# Patient Record
Sex: FEMALE | Race: WHITE | NOT HISPANIC OR LATINO | URBAN - METROPOLITAN AREA
[De-identification: names, ages, dates, MRNs, and addresses within clinical notes are randomized per-mention and may not be internally consistent; named-entity substitution may affect disease eponyms.]

---

## 2019-10-05 ENCOUNTER — INPATIENT (INPATIENT)
Facility: HOSPITAL | Age: 84
LOS: 3 days | Discharge: NURSING, REHAB OR LONG-TERM CARE | DRG: 65 | End: 2019-10-09
Attending: INTERNAL MEDICINE | Admitting: INTERNAL MEDICINE
Payer: MEDICARE

## 2019-10-05 ENCOUNTER — EMERGENCY (EMERGENCY)
Facility: HOSPITAL | Age: 84
LOS: 1 days | Discharge: ANOTHER HOSPITAL | End: 2019-10-05
Attending: EMERGENCY MEDICINE | Admitting: EMERGENCY MEDICINE
Payer: MEDICARE

## 2019-10-05 VITALS
OXYGEN SATURATION: 99 % | DIASTOLIC BLOOD PRESSURE: 75 MMHG | RESPIRATION RATE: 16 BRPM | TEMPERATURE: 99 F | SYSTOLIC BLOOD PRESSURE: 139 MMHG | HEART RATE: 96 BPM

## 2019-10-05 VITALS
SYSTOLIC BLOOD PRESSURE: 200 MMHG | HEIGHT: 68 IN | RESPIRATION RATE: 14 BRPM | TEMPERATURE: 97 F | HEART RATE: 68 BPM | DIASTOLIC BLOOD PRESSURE: 110 MMHG | WEIGHT: 160.06 LBS | OXYGEN SATURATION: 99 %

## 2019-10-05 VITALS
RESPIRATION RATE: 18 BRPM | DIASTOLIC BLOOD PRESSURE: 66 MMHG | SYSTOLIC BLOOD PRESSURE: 142 MMHG | OXYGEN SATURATION: 98 % | HEART RATE: 88 BPM

## 2019-10-05 DIAGNOSIS — I62.00 NONTRAUMATIC SUBDURAL HEMORRHAGE, UNSPECIFIED: ICD-10-CM

## 2019-10-05 LAB
ALBUMIN SERPL ELPH-MCNC: 3.7 G/DL — SIGNIFICANT CHANGE UP (ref 3.3–5)
ALP SERPL-CCNC: 68 U/L — SIGNIFICANT CHANGE UP (ref 30–120)
ALT FLD-CCNC: 17 U/L DA — SIGNIFICANT CHANGE UP (ref 10–60)
ANION GAP SERPL CALC-SCNC: 12 MMOL/L — SIGNIFICANT CHANGE UP (ref 5–17)
APTT BLD: 31 SEC — SIGNIFICANT CHANGE UP (ref 28.5–37)
AST SERPL-CCNC: 18 U/L — SIGNIFICANT CHANGE UP (ref 10–40)
BASOPHILS # BLD AUTO: 0.04 K/UL — SIGNIFICANT CHANGE UP (ref 0–0.2)
BASOPHILS NFR BLD AUTO: 0.4 % — SIGNIFICANT CHANGE UP (ref 0–2)
BILIRUB SERPL-MCNC: 0.4 MG/DL — SIGNIFICANT CHANGE UP (ref 0.2–1.2)
BUN SERPL-MCNC: 16 MG/DL — SIGNIFICANT CHANGE UP (ref 7–23)
CALCIUM SERPL-MCNC: 9.8 MG/DL — SIGNIFICANT CHANGE UP (ref 8.4–10.5)
CHLORIDE SERPL-SCNC: 101 MMOL/L — SIGNIFICANT CHANGE UP (ref 96–108)
CO2 SERPL-SCNC: 24 MMOL/L — SIGNIFICANT CHANGE UP (ref 22–31)
CREAT SERPL-MCNC: 0.92 MG/DL — SIGNIFICANT CHANGE UP (ref 0.5–1.3)
EOSINOPHIL # BLD AUTO: 0.12 K/UL — SIGNIFICANT CHANGE UP (ref 0–0.5)
EOSINOPHIL NFR BLD AUTO: 1.1 % — SIGNIFICANT CHANGE UP (ref 0–6)
GLUCOSE BLDC GLUCOMTR-MCNC: 178 MG/DL — HIGH (ref 70–99)
GLUCOSE BLDC GLUCOMTR-MCNC: 200 MG/DL — HIGH (ref 70–99)
GLUCOSE BLDC GLUCOMTR-MCNC: 210 MG/DL — HIGH (ref 70–99)
GLUCOSE SERPL-MCNC: 190 MG/DL — HIGH (ref 70–99)
HCT VFR BLD CALC: 40.1 % — SIGNIFICANT CHANGE UP (ref 34.5–45)
HGB BLD-MCNC: 12.9 G/DL — SIGNIFICANT CHANGE UP (ref 11.5–15.5)
IMM GRANULOCYTES NFR BLD AUTO: 0.7 % — SIGNIFICANT CHANGE UP (ref 0–1.5)
INR BLD: 1.07 RATIO — SIGNIFICANT CHANGE UP (ref 0.88–1.16)
LYMPHOCYTES # BLD AUTO: 1.75 K/UL — SIGNIFICANT CHANGE UP (ref 1–3.3)
LYMPHOCYTES # BLD AUTO: 16 % — SIGNIFICANT CHANGE UP (ref 13–44)
MCHC RBC-ENTMCNC: 28 PG — SIGNIFICANT CHANGE UP (ref 27–34)
MCHC RBC-ENTMCNC: 32.2 GM/DL — SIGNIFICANT CHANGE UP (ref 32–36)
MCV RBC AUTO: 87.2 FL — SIGNIFICANT CHANGE UP (ref 80–100)
MONOCYTES # BLD AUTO: 0.73 K/UL — SIGNIFICANT CHANGE UP (ref 0–0.9)
MONOCYTES NFR BLD AUTO: 6.7 % — SIGNIFICANT CHANGE UP (ref 2–14)
NEUTROPHILS # BLD AUTO: 8.23 K/UL — HIGH (ref 1.8–7.4)
NEUTROPHILS NFR BLD AUTO: 75.1 % — SIGNIFICANT CHANGE UP (ref 43–77)
NRBC # BLD: 0 /100 WBCS — SIGNIFICANT CHANGE UP (ref 0–0)
PLATELET # BLD AUTO: 298 K/UL — SIGNIFICANT CHANGE UP (ref 150–400)
POTASSIUM SERPL-MCNC: 3.6 MMOL/L — SIGNIFICANT CHANGE UP (ref 3.5–5.3)
POTASSIUM SERPL-SCNC: 3.6 MMOL/L — SIGNIFICANT CHANGE UP (ref 3.5–5.3)
PROT SERPL-MCNC: 8.1 G/DL — SIGNIFICANT CHANGE UP (ref 6–8.3)
PROTHROM AB SERPL-ACNC: 11.7 SEC — SIGNIFICANT CHANGE UP (ref 10–12.9)
RBC # BLD: 4.6 M/UL — SIGNIFICANT CHANGE UP (ref 3.8–5.2)
RBC # FLD: 14 % — SIGNIFICANT CHANGE UP (ref 10.3–14.5)
SODIUM SERPL-SCNC: 137 MMOL/L — SIGNIFICANT CHANGE UP (ref 135–145)
TROPONIN I SERPL-MCNC: 0 NG/ML — LOW (ref 0.02–0.06)
WBC # BLD: 10.95 K/UL — HIGH (ref 3.8–10.5)
WBC # FLD AUTO: 10.95 K/UL — HIGH (ref 3.8–10.5)

## 2019-10-05 PROCEDURE — 85027 COMPLETE CBC AUTOMATED: CPT

## 2019-10-05 PROCEDURE — 84484 ASSAY OF TROPONIN QUANT: CPT

## 2019-10-05 PROCEDURE — 99291 CRITICAL CARE FIRST HOUR: CPT

## 2019-10-05 PROCEDURE — 70450 CT HEAD/BRAIN W/O DYE: CPT

## 2019-10-05 PROCEDURE — 99285 EMERGENCY DEPT VISIT HI MDM: CPT

## 2019-10-05 PROCEDURE — 71045 X-RAY EXAM CHEST 1 VIEW: CPT

## 2019-10-05 PROCEDURE — 36415 COLL VENOUS BLD VENIPUNCTURE: CPT

## 2019-10-05 PROCEDURE — 70450 CT HEAD/BRAIN W/O DYE: CPT | Mod: 26

## 2019-10-05 PROCEDURE — 93005 ELECTROCARDIOGRAM TRACING: CPT

## 2019-10-05 PROCEDURE — 82962 GLUCOSE BLOOD TEST: CPT

## 2019-10-05 PROCEDURE — 85730 THROMBOPLASTIN TIME PARTIAL: CPT

## 2019-10-05 PROCEDURE — 70450 CT HEAD/BRAIN W/O DYE: CPT | Mod: 26,77

## 2019-10-05 PROCEDURE — 71045 X-RAY EXAM CHEST 1 VIEW: CPT | Mod: 26

## 2019-10-05 PROCEDURE — 93010 ELECTROCARDIOGRAM REPORT: CPT

## 2019-10-05 PROCEDURE — 80053 COMPREHEN METABOLIC PANEL: CPT

## 2019-10-05 PROCEDURE — 85610 PROTHROMBIN TIME: CPT

## 2019-10-05 RX ORDER — GLYBURIDE-METFORMIN HYDROCHLORIDE 1.25; 25 MG/1; MG/1
1 TABLET ORAL
Qty: 0 | Refills: 0 | DISCHARGE

## 2019-10-05 RX ORDER — DEXTROSE 50 % IN WATER 50 %
12.5 SYRINGE (ML) INTRAVENOUS ONCE
Refills: 0 | Status: DISCONTINUED | OUTPATIENT
Start: 2019-10-05 | End: 2019-10-09

## 2019-10-05 RX ORDER — METOPROLOL TARTRATE 50 MG
50 TABLET ORAL DAILY
Refills: 0 | Status: DISCONTINUED | OUTPATIENT
Start: 2019-10-05 | End: 2019-10-06

## 2019-10-05 RX ORDER — ACETAMINOPHEN 500 MG
975 TABLET ORAL ONCE
Refills: 0 | Status: COMPLETED | OUTPATIENT
Start: 2019-10-05 | End: 2019-10-05

## 2019-10-05 RX ORDER — LEVOTHYROXINE SODIUM 125 MCG
112 TABLET ORAL DAILY
Refills: 0 | Status: DISCONTINUED | OUTPATIENT
Start: 2019-10-05 | End: 2019-10-09

## 2019-10-05 RX ORDER — INSULIN LISPRO 100/ML
VIAL (ML) SUBCUTANEOUS
Refills: 0 | Status: DISCONTINUED | OUTPATIENT
Start: 2019-10-05 | End: 2019-10-09

## 2019-10-05 RX ORDER — FUROSEMIDE 40 MG
20 TABLET ORAL DAILY
Refills: 0 | Status: DISCONTINUED | OUTPATIENT
Start: 2019-10-05 | End: 2019-10-07

## 2019-10-05 RX ORDER — SODIUM CHLORIDE 9 MG/ML
1000 INJECTION, SOLUTION INTRAVENOUS
Refills: 0 | Status: DISCONTINUED | OUTPATIENT
Start: 2019-10-05 | End: 2019-10-09

## 2019-10-05 RX ORDER — LISINOPRIL 2.5 MG/1
20 TABLET ORAL DAILY
Refills: 0 | Status: DISCONTINUED | OUTPATIENT
Start: 2019-10-05 | End: 2019-10-08

## 2019-10-05 RX ORDER — NICARDIPINE HYDROCHLORIDE 30 MG/1
5 CAPSULE, EXTENDED RELEASE ORAL
Qty: 40 | Refills: 0 | Status: DISCONTINUED | OUTPATIENT
Start: 2019-10-05 | End: 2019-10-20

## 2019-10-05 RX ORDER — GLUCAGON INJECTION, SOLUTION 0.5 MG/.1ML
1 INJECTION, SOLUTION SUBCUTANEOUS ONCE
Refills: 0 | Status: DISCONTINUED | OUTPATIENT
Start: 2019-10-05 | End: 2019-10-09

## 2019-10-05 RX ORDER — DEXTROSE 50 % IN WATER 50 %
15 SYRINGE (ML) INTRAVENOUS ONCE
Refills: 0 | Status: DISCONTINUED | OUTPATIENT
Start: 2019-10-05 | End: 2019-10-09

## 2019-10-05 RX ORDER — SIMVASTATIN 20 MG/1
5 TABLET, FILM COATED ORAL AT BEDTIME
Refills: 0 | Status: DISCONTINUED | OUTPATIENT
Start: 2019-10-05 | End: 2019-10-09

## 2019-10-05 RX ORDER — DEXTROSE 50 % IN WATER 50 %
25 SYRINGE (ML) INTRAVENOUS ONCE
Refills: 0 | Status: DISCONTINUED | OUTPATIENT
Start: 2019-10-05 | End: 2019-10-09

## 2019-10-05 RX ORDER — LABETALOL HCL 100 MG
10 TABLET ORAL ONCE
Refills: 0 | Status: COMPLETED | OUTPATIENT
Start: 2019-10-05 | End: 2019-10-05

## 2019-10-05 RX ORDER — INSULIN DETEMIR 100/ML (3)
0 INSULIN PEN (ML) SUBCUTANEOUS
Qty: 0 | Refills: 0 | DISCHARGE

## 2019-10-05 RX ORDER — INSULIN DETEMIR 100/ML (3)
3 INSULIN PEN (ML) SUBCUTANEOUS
Qty: 0 | Refills: 0 | DISCHARGE

## 2019-10-05 RX ORDER — NICARDIPINE HYDROCHLORIDE 30 MG/1
5 CAPSULE, EXTENDED RELEASE ORAL
Qty: 40 | Refills: 0 | Status: DISCONTINUED | OUTPATIENT
Start: 2019-10-05 | End: 2019-10-05

## 2019-10-05 RX ORDER — LISINOPRIL 2.5 MG/1
20 TABLET ORAL ONCE
Refills: 0 | Status: COMPLETED | OUTPATIENT
Start: 2019-10-05 | End: 2019-10-05

## 2019-10-05 RX ORDER — ONDANSETRON 8 MG/1
4 TABLET, FILM COATED ORAL ONCE
Refills: 0 | Status: COMPLETED | OUTPATIENT
Start: 2019-10-05 | End: 2019-10-05

## 2019-10-05 RX ADMIN — Medication 10 MILLIGRAM(S): at 11:50

## 2019-10-05 RX ADMIN — SIMVASTATIN 5 MILLIGRAM(S): 20 TABLET, FILM COATED ORAL at 21:19

## 2019-10-05 RX ADMIN — LISINOPRIL 20 MILLIGRAM(S): 2.5 TABLET ORAL at 17:56

## 2019-10-05 RX ADMIN — Medication 975 MILLIGRAM(S): at 17:56

## 2019-10-05 RX ADMIN — NICARDIPINE HYDROCHLORIDE 25 MG/HR: 30 CAPSULE, EXTENDED RELEASE ORAL at 12:16

## 2019-10-05 RX ADMIN — Medication 975 MILLIGRAM(S): at 21:00

## 2019-10-05 RX ADMIN — Medication 10 MILLIGRAM(S): at 12:00

## 2019-10-05 RX ADMIN — ONDANSETRON 4 MILLIGRAM(S): 8 TABLET, FILM COATED ORAL at 17:11

## 2019-10-05 NOTE — CONSULT NOTE ADULT - SUBJECTIVE AND OBJECTIVE BOX
p (1480)     HPI: 83F presents as transfer from [place] after being brought there by family for confusion. On initital presentation she was found to have SBPs 220+ and started on cardine. Upon arrival to [facility] she was normotensive not on any drips and back to baseline mental and functional status.    Denies pain, HA, N, V.    Exam: awake, alert, oriented to person and place, FC, REDD x4 5/5 strength, SILT, no facial, no drift    --Anticoagulation:    =====================  PAST MEDICAL HISTORY   Primary hyperparathyroidism  HTN (hypertension)  Diabetes mellitus  Hyperlipidemia  Hypothyroid  H/O fracture of humerus    PAST SURGICAL HISTORY   S/P cholecystectomy    penicillin (Muscle Pain)      MEDICATIONS:  Antibiotics:    Neuro:    Other:  dextrose 40% Gel 15 Gram(s) Oral once PRN  dextrose 5%. 1000 milliLiter(s) IV Continuous <Continuous>  dextrose 50% Injectable 12.5 Gram(s) IV Push once  dextrose 50% Injectable 25 Gram(s) IV Push once  dextrose 50% Injectable 25 Gram(s) IV Push once  furosemide    Tablet 20 milliGRAM(s) Oral daily  glucagon  Injectable 1 milliGRAM(s) IntraMuscular once PRN  insulin lispro (HumaLOG) corrective regimen sliding scale   SubCutaneous three times a day before meals  levothyroxine 112 MICROGram(s) Oral daily  lisinopril 20 milliGRAM(s) Oral daily  metoprolol succinate ER 50 milliGRAM(s) Oral daily  simvastatin 5 milliGRAM(s) Oral at bedtime      SOCIAL HISTORY:   Occupation:   Marital Status:     FAMILY HISTORY:      ROS: Negative except per HPI    LABS:  PT/INR - ( 05 Oct 2019 12:45 )   PT: 11.7 sec;   INR: 1.07 ratio         PTT - ( 05 Oct 2019 12:45 )  PTT:31.0 sec                        12.9   10.95 )-----------( 298      ( 05 Oct 2019 11:22 )             40.1     10-05    137  |  101  |  16  ----------------------------<  190<H>  3.6   |  24  |  0.92    Ca    9.8      05 Oct 2019 11:22    TPro  8.1  /  Alb  3.7  /  TBili  0.4  /  DBili  x   /  AST  18  /  ALT  17  /  AlkPhos  68  10-05

## 2019-10-05 NOTE — ED ADULT NURSE REASSESSMENT NOTE - NS ED NURSE REASSESS COMMENT FT1
Patient aware they are being admitted to the hospital. Will be notified when bed is available. Patient/family has no further questions at this time.

## 2019-10-05 NOTE — ED PROVIDER NOTE - ATTENDING CONTRIBUTION TO CARE
83 F w/ PMH of DM, HTN, HLD presents to the ED s/p an episode of confusion, repetitive questioning this morning.  On asa last dose yesterday  Pt is on insulin. She reports that she has had no falls. Didn't take her home meds this AM,    On exam, pt is awake and alert oriented x4, she is no acute distress, she initially felt nauseous had improvement in blood pressure, while in the ed,  Const: Well-nourished, Well-developed, appearing stated age  Head: atraumatic, normocephalic  Eyes: no conjunctival injection and no scleral icterus pupils are 3 mm and reactive bilaterally, EOMI  ENMT: Atraumatic external nose and ears, Moist mucus membranes  Neck: Symmetric, trachea midline,  CVS: +S1/S2, dorsalis pedis/radial pulse 2+ bilaterally  RESP: Unlabored respiratory effort, Clear to auscultation bilaterally  GI: Nontender/Nondistended, soft abdomen  MSK: Extremities w/o deformity or ttp   Skin: Warm, Dry w/ no rashes  Neuro: GCS=15, CNs II-XII grossly intact, motor in all 4 extremities equal, Sensation intact bilaterally    NSG consult, and likely repeat ct scan in 4 hrs from initial

## 2019-10-05 NOTE — ED ADULT NURSE REASSESSMENT NOTE - NS ED NURSE REASSESS COMMENT FT1
pt transferred to [facility] via ambulance pt left ed awake and alert patent 20 larry iv cath in rt ac infusing cardene at 5mg/hr  report given to idris bundy. respirations even and unlabored color good pt offered no complaints at time of transport   family accompanying, plan of care reviewed with all

## 2019-10-05 NOTE — ED PROVIDER NOTE - ATTENDING CONTRIBUTION TO CARE
I have personally performed a face to face diagnostic evaluation on this patient.  I have reviewed the PA note and agree with the history, exam, and plan of care, except as noted.  History and Exam by me shows ...see Progress Note

## 2019-10-05 NOTE — ED PROVIDER NOTE - PHYSICAL EXAMINATION
Gen: No acute distress, alert, cooperative  HENT: Normocephalic, atraumatic.   Eyes: PERRL, EOMI    Resp: CTAB, non-labored, no wheeze  CV: rrr, no murmur  Abd: soft, NTND, no rebound or guarding  MSK: moving all extremities, no midline cervical tenderness  Skin: warm and dry  Neuro: AOx3, speech is fluent and appropriate, no focal deficits, cranial nerves intact, normal finger to nose  Psych: Normal affect

## 2019-10-05 NOTE — ED PROVIDER NOTE - PMH
Diabetes mellitus    H/O fracture of humerus    HTN (hypertension)    Hyperlipidemia    Hypothyroid    Primary hyperparathyroidism

## 2019-10-05 NOTE — ED ADULT NURSE NOTE - NSIMPLEMENTINTERV_GEN_ALL_ED
Implemented All Universal Safety Interventions:  [place] to call system. Call bell, personal items and telephone within reach. Instruct patient to call for assistance. Room bathroom lighting operational. Non-slip footwear when patient is off stretcher. Physically safe environment: no spills, clutter or unnecessary equipment. Stretcher in lowest position, wheels locked, appropriate side rails in place.

## 2019-10-05 NOTE — CONSULT NOTE ADULT - SUBJECTIVE AND OBJECTIVE BOX
CHIEF COMPLAINT:Patient is a 83y old  Female who presents with a chief complaint of head ache /nausea    HPI:   83  F presents w/ confusion, from [facility] transferred on [place] drip. Pt is not on thinners, atraumatic head bleed   	Family brought pt in not answering questions normally, Pt has a pmh of  DM, HTN, HLD    	on baby aspirin last dose was yesterday in the evening  upon arrival to [place], pt was hypertensive w/ SBP 220s, called NSG at [facility] who recommended cardene    PAST MEDICAL & SURGICAL HISTORY:  Primary hyperparathyroidism  HTN (hypertension)  Diabetes mellitus  Hyperlipidemia  Hypothyroid  H/O fracture of humerus  S/P cholecystectomy      MEDICATIONS  (STANDING):    MEDICATIONS  (PRN):      FAMILY HISTORY:      SOCIAL HISTORY:    [ ] Non-smoker  [ ] Smoker  [ ] Alcohol    Allergies    penicillin (Muscle Pain)    Intolerances    	    REVIEW OF SYSTEMS:  CONSTITUTIONAL: No fever, weight loss, or fatigue  EYES: No eye pain, visual disturbances, or discharge  ENT:  No difficulty hearing, tinnitus, vertigo; No sinus or throat pain  NECK: No pain or stiffness  RESPIRATORY: No cough, wheezing, chills or hemoptysis; No Shortness of Breath  CARDIOVASCULAR: No chest pain, palpitations, passing out, dizziness, or leg swelling  GASTROINTESTINAL: No abdominal or epigastric pain. No nausea, vomiting, or hematemesis; No diarrhea or constipation. No melena or hematochezia.  GENITOURINARY: No dysuria, frequency, hematuria, or incontinence  NEUROLOGICAL: No headaches, memory loss, loss of strength, numbness, or tremors  SKIN: No itching, burning, rashes, or lesions   LYMPH Nodes: No enlarged glands  ENDOCRINE: No heat or cold intolerance; No hair loss  MUSCULOSKELETAL: No joint pain or swelling; No muscle, back, or extremity pain  PSYCHIATRIC: No depression, anxiety, mood swings, or difficulty sleeping  HEME/LYMPH: No easy bruising, or bleeding gums  ALLERGY AND IMMUNOLOGIC: No hives or eczema	    [ ] All others negative	  [ ] Unable to obtain    PHYSICAL EXAM:  T(C): 37.3 (10-05-19 @ 16:45), Max: 37.3 (10-05-19 @ 13:53)  HR: 90 (10-05-19 @ 17:45) (68 - 96)  BP: 154/69 (10-05-19 @ 17:45) (139/75 - 220/105)  RR: 18 (10-05-19 @ 17:45) (14 - 20)  SpO2: 100% (10-05-19 @ 17:45) (97% - 100%)  Wt(kg): --  I&O's Summary      Appearance: Normal	  HEENT:   Normal oral mucosa, PERRL, EOMI	  Lymphatic: No lymphadenopathy  Cardiovascular: Normal S1 S2, No JVD, +murmurs, No edema  Respiratory: Lungs clear to auscultation	  Psychiatry: A & O x 3, Mood & affect appropriate  Gastrointestinal:  Soft, Non-tender, + BS	  Skin: No rashes, No ecchymoses, No cyanosis	  Neurologic: Non-focal  Extremities: Normal range of motion, No clubbing, cyanosis or edema  Vascular: Peripheral pulses palpable 2+ bilaterally    TELEMETRY: 	    ECG:  	  RADIOLOGY:  OTHER: 	  	  LABS:	 	    CARDIAC MARKERS:  CARDIAC MARKERS ( 05 Oct 2019 11:22 )  .000 ng/mL / x     / x     / x     / x                                  12.9   10.95 )-----------( 298      ( 05 Oct 2019 11:22 )             40.1     10-05    137  |  101  |  16  ----------------------------<  190<H>  3.6   |  24  |  0.92    Ca    9.8      05 Oct 2019 11:22    TPro  8.1  /  Alb  3.7  /  TBili  0.4  /  DBili  x   /  AST  18  /  ALT  17  /  AlkPhos  68  10-05    proBNP:   Lipid Profile:   HgA1c:   TSH:   PT/INR - ( 05 Oct 2019 12:45 )   PT: 11.7 sec;   INR: 1.07 ratio         PTT - ( 05 Oct 2019 12:45 )  PTT:31.0 sec    PREVIOUS DIAGNOSTIC TESTING:    < from: CT Head No Cont (10.05.19 @ 16:41) >  IMPRESSION:   Unchanged acute subdural hemorrhage along the falx and left territorial   leaflet.

## 2019-10-05 NOTE — H&P ADULT - HISTORY OF PRESENT ILLNESS
: 83 F presents w/ confusion, from [facility] transferred on cardene drip.    Pt is not on thinners, atraumatic head bleed.   Family brought pt in, as  pt was  not answering questions normally/  s/p  brief  confusion  , Pt has a pmh of  DM, HTN, HLD   on baby aspirin last dose was yesterday in the evening upon arrival to [place], pt was hypertensive w/ SBP 220s, called NSG at [facility] who recommended cardene,	  in er,  alert/  talking well/ no  cp/sob/ headcahes

## 2019-10-05 NOTE — CONSULT NOTE ADULT - ASSESSMENT
83  F presents w/ confusion, from [facility] transferred on [place] drip. Pt is not on thinners, atraumatic head bleed   	Family brought pt in not answering questions normally, Pt has a pmh of  DM, HTN, HLD    	on baby aspirin last dose was yesterday in the evening  upon arrival to [place], pt was hypertensive w/ SBP 220s, called NSG at [facility] who recommended [place]  uncontrolled htn  continue beta blocker and ace if not controlled will consider iv [place]  repeat head ct scan  neuro check  tele  echo  lipid panel  tsh

## 2019-10-05 NOTE — H&P ADULT - NSHPREVIEWOFSYSTEMS_GEN_ALL_CORE
REVIEW OF SYSTEMS:  GEN: no fever,    no chills  RESP: no SOB,   no cough  CVS: no chest pain,   no palpitations  GI: no abdominal pain,   no nausea,   no vomiting,   no constipation,   no diarrhea  : no dysuria,   no frequency  NEURO:  headache,   no dizziness  PSYCH: no depression,   not anxious  Derm : no rash

## 2019-10-05 NOTE — ED PROVIDER NOTE - CONSTITUTIONAL, MLM
normal... Well appearing, well nourished, awake, alert, oriented to person, place not time and in no apparent distress.

## 2019-10-05 NOTE — ED PROVIDER NOTE - PSYCHIATRIC, MLM
Alert and oriented to person, place, not time normal mood and affect. no apparent risk to self or others.

## 2019-10-05 NOTE — ED ADULT NURSE NOTE - OBJECTIVE STATEMENT
pt spoke to family last night and was "normal mental status" upon family arrival today pt altered unable to recall people or circumstances of the day  pt oriented to place and self upon arrival unable to provide year

## 2019-10-05 NOTE — H&P ADULT - NSHPPHYSICALEXAM_GEN_ALL_CORE
PHYSICAL EXAMINATION:  Vital Signs Last 24 Hrs  T(C): 37.3 (05 Oct 2019 16:45), Max: 37.3 (05 Oct 2019 13:53)  T(F): 99.2 (05 Oct 2019 16:45), Max: 99.2 (05 Oct 2019 16:45)  HR: 90 (05 Oct 2019 17:45) (68 - 96)  BP: 154/69 (05 Oct 2019 17:45) (139/75 - 220/105)  BP(mean): --  RR: 18 (05 Oct 2019 17:45) (14 - 20)  SpO2: 100% (05 Oct 2019 17:45) (97% - 100%)  CAPILLARY BLOOD GLUCOSE      POCT Blood Glucose.: 195 mg/dL (05 Oct 2019 16:30)  POCT Blood Glucose.: 178 mg/dL (05 Oct 2019 10:59)        GENERAL: NAD, well-groomed,  HEAD:  atraumatic, normocephalic  EYES: sclera anicteric  ENMT: mucous membranes moist  NECK: supple, No JVD  CHEST/LUNG: clear to auscultation bilaterally;    no      rales   ,   no rhonchi,   HEART: normal S1, S2  ABDOMEN: BS+, soft, ND, NT   EXTREMITIES:    no    edema    b/l LEs  NEURO: awake, ,     moves all extremities  oriened  well/   SKIN: no     rash

## 2019-10-05 NOTE — ED PROVIDER NOTE - OBJECTIVE STATEMENT
Pt is a 84 yo female with pmhx of dm htn hld thyroid BIBEMS for AMS. As per family spoke with patient last night was A&Ox3 and [place]. This morning pt was called and did not recall plan at all was repeating same questions. Pt has no complaints other then feeling tired and some nausea. pt denies any chest pain sob abdominal pain fever cough dysuria falls numbness tingling. Pt states she is compliant with meds but family is not sure if she is taking them.     pcp Bharati

## 2019-10-05 NOTE — ED ADULT NURSE REASSESSMENT NOTE - NS ED NURSE REASSESS COMMENT FT1
Patient alert and oriented, in no distress at this time. Pending type and screen, MD Agrawal states to hold off on drawing blood at this time. Patient okay to drink. Patient aware she is NPO until seen by neurology. Patient alert and oriented, in no distress at this time. Pending type and screen, MD Agrawal states to hold off on drawing blood at this time. Patient aware she is NPO until seen by neurology. Please see yellow neuro check flow sheets for continuation of neuro checks.

## 2019-10-05 NOTE — ED PROVIDER NOTE - OBJECTIVE STATEMENT
83 F presents w/ confusion, from [facility] transferred on cardene drip. Pt is not on thinners, atraumatic headbleed.   Family brought pt in not answering questions normally, Pt has a pmh of  DM, HTN, HLD    on baby aspirin last dose was yesterday in the evening  upon arrival to [place], pt was hypertensive w/ SBP 220s, called NSG at [facility] who recommended cardene,

## 2019-10-05 NOTE — ED PROVIDER NOTE - PROGRESS NOTE DETAILS
pt is awake and alert in NAD, pt states that he daughter (a nurse) states that she was confused and was told , pt was last seen was yesterday and this AM family came by this morning and she was reported to come to the ED, AKL spoke to neurosurg, aware of transfer, will come see patient. AK: tried calling pmd number listed, no answer. Not in directory. Will admit to unattached. Bridgett Calderon M.D: pt was signed out to me pending repeat CT and admission. at this time pt is off nicardipine and repeat ct is stable and pt is to be admitted for monitoring and bp control. pt neuro intact and stable at this time.

## 2019-10-05 NOTE — ED ADULT NURSE NOTE - OBJECTIVE STATEMENT
83 year old Female, PMH: HTN, HLD, DM, hypothyroid. Around 11am this morning, patient was confused and hypertensive 200s- brought in to [facility]- as per EMS patient has a subdural bleed, /75 in route. As per EMS, no deficits found, not on anticoagulation started on Cardiene 2.5mL/hr, . Upon arrival to ED, patient A&Ox3, awake, alert and following commands, breathing spontaneous and unlabored, equal chest rise and fall, palpable pulses, abdomen nontender upon palpation, nondistended, motor and sensory intact. IV placed, vitals recorded, neuro check completed, dysphagia completed, placed on cardiac monitor- sinus rhythm, on room air. In no distress at this time. Family at bedside. Patient pending CT. Patient educated to ask for assistance to ambulate, bed locked and in lowest position with side rails up for safety. Comfort and safety provided.

## 2019-10-05 NOTE — ED PROVIDER NOTE - CLINICAL SUMMARY MEDICAL DECISION MAKING FREE TEXT BOX
83 F presents as transfer from [place] for SDH after presenting with confusion. Transferred on cardene drip. Neuro intact, no deficit, no confusion noted. Neurosurgery contacted.

## 2019-10-05 NOTE — ED PROVIDER NOTE - CARE PLAN
Principal Discharge DX:	Subdural bleeding Principal Discharge DX:	Subdural bleeding  Secondary Diagnosis:	Hypertension

## 2019-10-05 NOTE — ED PROVIDER NOTE - NS_ ATTENDINGSCRIBEDETAILS _ED_A_ED_FT
Venu Martinez MD - The scribe's documentation has been prepared under my direction and personally reviewed by me in its entirety. I confirm that the note above accurately reflects all work, treatment, procedures, and medical decision making performed by me.

## 2019-10-05 NOTE — ED PROVIDER NOTE - PROGRESS NOTE DETAILS
Mercy Jones for attending Dr. Martinez: Awake, cephalic and atraumatic. Pt c/o nasuea before. No headache, chest pain, or SOB.  Questionable compliance with medications. Physical exam is unremarkable. nonfocal, no neural deficits. I Keren Jones attest that this documentation has been prepared under the direction and in the presence of Doctor Martinez. + subdural labatalol x2 for bp given transfer initiated spoke with dr. Goel goal 160 started on cardene drop bp improved accepting doctor Dr. Castro   labs wnl  will transfer

## 2019-10-05 NOTE — H&P ADULT - NSICDXPASTMEDICALHX_GEN_ALL_CORE_FT
PAST MEDICAL HISTORY:  Diabetes mellitus     H/O fracture of humerus     HTN (hypertension)     Hyperlipidemia     Hypothyroid     Primary hyperparathyroidism

## 2019-10-05 NOTE — H&P ADULT - NSHPLABSRESULTS_GEN_ALL_CORE
LABS:                        12.9   10.95 )-----------( 298      ( 05 Oct 2019 11:22 )             40.1     10-05    137  |  101  |  16  ----------------------------<  190<H>  3.6   |  24  |  0.92    Ca    9.8      05 Oct 2019 11:22    TPro  8.1  /  Alb  3.7  /  TBili  0.4  /  DBili  x   /  AST  18  /  ALT  17  /  AlkPhos  68  10-05    PT/INR - ( 05 Oct 2019 12:45 )   PT: 11.7 sec;   INR: 1.07 ratio         PTT - ( 05 Oct 2019 12:45 )  PTT:31.0 sec  CARDIAC MARKERS ( 05 Oct 2019 11:22 )  .000 ng/mL / x     / x     / x     / x

## 2019-10-05 NOTE — CONSULT NOTE ADULT - ASSESSMENT
Reyna Velasco  83F PMHx DM, HTN, HLD, hypothyroidism presented to [place] ED after episode of AMS in am, found to have ’s, put on cardene and transferred to [facility]. Patient is currently normotensive, not on any drips. CT head shows acute falcine and L tentorial SDH without shift. Per family patient likely has not been taking medications as prescribed. Not on AC. Exam: awake, alert, oriented to person and place, REDD x4 5/5 strength, SILT, no facial, no drift  - Repeat 4hr CT stable  - INR 1.07  - No acute neurosurgical intervention, reconsult as necessary

## 2019-10-06 DIAGNOSIS — E87.1 HYPO-OSMOLALITY AND HYPONATREMIA: ICD-10-CM

## 2019-10-06 LAB
ANION GAP SERPL CALC-SCNC: 11 MMOL/L — SIGNIFICANT CHANGE UP (ref 5–17)
ANION GAP SERPL CALC-SCNC: 11 MMOL/L — SIGNIFICANT CHANGE UP (ref 5–17)
ANION GAP SERPL CALC-SCNC: 13 MMOL/L — SIGNIFICANT CHANGE UP (ref 5–17)
BUN SERPL-MCNC: 13 MG/DL — SIGNIFICANT CHANGE UP (ref 7–23)
BUN SERPL-MCNC: 13 MG/DL — SIGNIFICANT CHANGE UP (ref 7–23)
BUN SERPL-MCNC: 19 MG/DL — SIGNIFICANT CHANGE UP (ref 7–23)
CALCIUM SERPL-MCNC: 8.8 MG/DL — SIGNIFICANT CHANGE UP (ref 8.4–10.5)
CALCIUM SERPL-MCNC: 8.8 MG/DL — SIGNIFICANT CHANGE UP (ref 8.4–10.5)
CALCIUM SERPL-MCNC: 9 MG/DL — SIGNIFICANT CHANGE UP (ref 8.4–10.5)
CHLORIDE SERPL-SCNC: 91 MMOL/L — LOW (ref 96–108)
CHLORIDE SERPL-SCNC: 92 MMOL/L — LOW (ref 96–108)
CHLORIDE SERPL-SCNC: 94 MMOL/L — LOW (ref 96–108)
CO2 SERPL-SCNC: 20 MMOL/L — LOW (ref 22–31)
CO2 SERPL-SCNC: 21 MMOL/L — LOW (ref 22–31)
CO2 SERPL-SCNC: 21 MMOL/L — LOW (ref 22–31)
CREAT SERPL-MCNC: 0.6 MG/DL — SIGNIFICANT CHANGE UP (ref 0.5–1.3)
CREAT SERPL-MCNC: 0.6 MG/DL — SIGNIFICANT CHANGE UP (ref 0.5–1.3)
CREAT SERPL-MCNC: 0.67 MG/DL — SIGNIFICANT CHANGE UP (ref 0.5–1.3)
GLUCOSE BLDC GLUCOMTR-MCNC: 155 MG/DL — HIGH (ref 70–99)
GLUCOSE BLDC GLUCOMTR-MCNC: 194 MG/DL — HIGH (ref 70–99)
GLUCOSE BLDC GLUCOMTR-MCNC: 199 MG/DL — HIGH (ref 70–99)
GLUCOSE BLDC GLUCOMTR-MCNC: 211 MG/DL — HIGH (ref 70–99)
GLUCOSE SERPL-MCNC: 161 MG/DL — HIGH (ref 70–99)
GLUCOSE SERPL-MCNC: 182 MG/DL — HIGH (ref 70–99)
GLUCOSE SERPL-MCNC: 217 MG/DL — HIGH (ref 70–99)
HBA1C BLD-MCNC: 7.4 % — HIGH (ref 4–5.6)
HCT VFR BLD CALC: 36.5 % — SIGNIFICANT CHANGE UP (ref 34.5–45)
HGB BLD-MCNC: 11.5 G/DL — SIGNIFICANT CHANGE UP (ref 11.5–15.5)
MCHC RBC-ENTMCNC: 27.9 PG — SIGNIFICANT CHANGE UP (ref 27–34)
MCHC RBC-ENTMCNC: 31.5 GM/DL — LOW (ref 32–36)
MCV RBC AUTO: 88.6 FL — SIGNIFICANT CHANGE UP (ref 80–100)
PLATELET # BLD AUTO: 263 K/UL — SIGNIFICANT CHANGE UP (ref 150–400)
POTASSIUM SERPL-MCNC: 3.5 MMOL/L — SIGNIFICANT CHANGE UP (ref 3.5–5.3)
POTASSIUM SERPL-MCNC: 3.6 MMOL/L — SIGNIFICANT CHANGE UP (ref 3.5–5.3)
POTASSIUM SERPL-MCNC: 3.7 MMOL/L — SIGNIFICANT CHANGE UP (ref 3.5–5.3)
POTASSIUM SERPL-SCNC: 3.5 MMOL/L — SIGNIFICANT CHANGE UP (ref 3.5–5.3)
POTASSIUM SERPL-SCNC: 3.6 MMOL/L — SIGNIFICANT CHANGE UP (ref 3.5–5.3)
POTASSIUM SERPL-SCNC: 3.7 MMOL/L — SIGNIFICANT CHANGE UP (ref 3.5–5.3)
RBC # BLD: 4.12 M/UL — SIGNIFICANT CHANGE UP (ref 3.8–5.2)
RBC # FLD: 14.4 % — SIGNIFICANT CHANGE UP (ref 10.3–14.5)
SODIUM SERPL-SCNC: 123 MMOL/L — LOW (ref 135–145)
SODIUM SERPL-SCNC: 125 MMOL/L — LOW (ref 135–145)
SODIUM SERPL-SCNC: 126 MMOL/L — LOW (ref 135–145)
WBC # BLD: 12.87 K/UL — HIGH (ref 3.8–10.5)
WBC # FLD AUTO: 12.87 K/UL — HIGH (ref 3.8–10.5)

## 2019-10-06 PROCEDURE — 99222 1ST HOSP IP/OBS MODERATE 55: CPT

## 2019-10-06 RX ORDER — FUROSEMIDE 40 MG
20 TABLET ORAL ONCE
Refills: 0 | Status: COMPLETED | OUTPATIENT
Start: 2019-10-06 | End: 2019-10-06

## 2019-10-06 RX ORDER — METOPROLOL TARTRATE 50 MG
25 TABLET ORAL ONCE
Refills: 0 | Status: COMPLETED | OUTPATIENT
Start: 2019-10-06 | End: 2019-10-06

## 2019-10-06 RX ORDER — ONDANSETRON 8 MG/1
4 TABLET, FILM COATED ORAL EVERY 8 HOURS
Refills: 0 | Status: DISCONTINUED | OUTPATIENT
Start: 2019-10-06 | End: 2019-10-08

## 2019-10-06 RX ORDER — METOPROLOL TARTRATE 50 MG
75 TABLET ORAL DAILY
Refills: 0 | Status: DISCONTINUED | OUTPATIENT
Start: 2019-10-06 | End: 2019-10-09

## 2019-10-06 RX ORDER — ONDANSETRON 8 MG/1
4 TABLET, FILM COATED ORAL ONCE
Refills: 0 | Status: COMPLETED | OUTPATIENT
Start: 2019-10-06 | End: 2019-10-06

## 2019-10-06 RX ADMIN — Medication 2: at 12:32

## 2019-10-06 RX ADMIN — ONDANSETRON 4 MILLIGRAM(S): 8 TABLET, FILM COATED ORAL at 13:32

## 2019-10-06 RX ADMIN — Medication 112 MICROGRAM(S): at 05:14

## 2019-10-06 RX ADMIN — Medication 50 MILLIGRAM(S): at 05:15

## 2019-10-06 RX ADMIN — SIMVASTATIN 5 MILLIGRAM(S): 20 TABLET, FILM COATED ORAL at 21:11

## 2019-10-06 RX ADMIN — Medication 20 MILLIGRAM(S): at 05:14

## 2019-10-06 RX ADMIN — Medication 25 MILLIGRAM(S): at 15:36

## 2019-10-06 RX ADMIN — LISINOPRIL 20 MILLIGRAM(S): 2.5 TABLET ORAL at 05:15

## 2019-10-06 RX ADMIN — Medication 1: at 08:32

## 2019-10-06 RX ADMIN — Medication 20 MILLIGRAM(S): at 14:58

## 2019-10-06 RX ADMIN — Medication 1: at 17:49

## 2019-10-06 RX ADMIN — ONDANSETRON 4 MILLIGRAM(S): 8 TABLET, FILM COATED ORAL at 21:11

## 2019-10-06 NOTE — CONSULT NOTE ADULT - PROBLEM SELECTOR RECOMMENDATION 9
Euvolemia hyponatremia, acute; possibly 2/2 SIADH in setting recent head trauma (atraumatic SDH) and/or nausea.  On admission Scr 139, downtrend 126 next day 10/6.  Euvolemia on examination, denies hx malignancy/hyponatremia, change medications/diuretic.    PLAN:  - please start urea 15 BID, lasix 20 (saline diuresis), increase protein intake (ensure TID)  - free water restriction 1L daily  - please obtain urine electrolytes, urine osmolality, urine uric acid level  - please check TSH/FT4, cortisol level  - start antiemetic PRN  - trend serum BMP q6hr (ordered next bmp 6pm)

## 2019-10-06 NOTE — CONSULT NOTE ADULT - SUBJECTIVE AND OBJECTIVE BOX
HPI:   Patient is a 83y female who has been well until yesterday morning her daughter found her confused, took her to the hospital and she was found to have a sdh . No known trauma, no recent episodes of strong cough , hard squeeze for a bm, , abrupt stop and start in a car, dental work, vomiting . She was feeling perfectly well the day before and now all she feels is some mild nausea and mild headache. She was hypertensive on arrival. She only takes aspirin , no other antiplt or anticoag.     REVIEW OF SYSTEMS:  All other review of systems negative (Comprehensive ROS)    PAST MEDICAL & SURGICAL HISTORY:  Primary hyperparathyroidism  HTN (hypertension)  Diabetes mellitus  Hyperlipidemia  Hypothyroid  H/O fracture of humerus  S/P cholecystectomy      Allergies    penicillin (Muscle Pain)    Intolerances        Antimicrobials Day #  :    Other Medications:  dextrose 40% Gel 15 Gram(s) Oral once PRN  dextrose 5%. 1000 milliLiter(s) IV Continuous <Continuous>  dextrose 50% Injectable 12.5 Gram(s) IV Push once  dextrose 50% Injectable 25 Gram(s) IV Push once  dextrose 50% Injectable 25 Gram(s) IV Push once  furosemide    Tablet 20 milliGRAM(s) Oral daily  glucagon  Injectable 1 milliGRAM(s) IntraMuscular once PRN  insulin lispro (HumaLOG) corrective regimen sliding scale   SubCutaneous three times a day before meals  levothyroxine 112 MICROGram(s) Oral daily  lisinopril 20 milliGRAM(s) Oral daily  metoprolol succinate ER 75 milliGRAM(s) Oral daily  ondansetron Injectable 4 milliGRAM(s) IV Push every 8 hours PRN  simvastatin 5 milliGRAM(s) Oral at bedtime  Ure-Na 15gram 1 Packet(s) 1 Packet(s) Oral two times a day      FAMILY HISTORY:      SOCIAL HISTORY:  Smoking: [ ]Yes [ xx]No  ETOH: [ ]Yes [ ]No  Drug Use: [ ]Yes [ x]No   [ x ] Single[ ]    T(F): 98.1 (10-06-19 @ 11:57), Max: 98.8 (10-05-19 @ 21:00)  HR: 71 (10-06-19 @ 11:57)  BP: 170/82 (10-06-19 @ 16:29)  RR: 18 (10-06-19 @ 11:57)  SpO2: 96% (10-06-19 @ 11:57)  Wt(kg): --    PHYSICAL EXAM:  General: alert, no acute distress  Eyes:  anicteric, no conjunctival injection, no discharge  Oropharynx: no lesions or injection 	  Neck: supple, without adenopathy  Lungs: clear to auscultation  Heart: regular rate and rhythm; no murmur, rubs or gallops  Abdomen: soft, nondistended, nontender, without mass or organomegaly  Skin: no lesions  Extremities: no clubbing, cyanosis, or edema  Neurologic: alert, oriented, moves all extremities    LAB RESULTS:                        11.5   12.87 )-----------( 263      ( 06 Oct 2019 09:10 )             36.5     10-06    123<L>  |  92<L>  |  13  ----------------------------<  217<H>  3.6   |  20<L>  |  0.60    Ca    8.8      06 Oct 2019 10:48    TPro  8.1  /  Alb  3.7  /  TBili  0.4  /  DBili  x   /  AST  18  /  ALT  17  /  AlkPhos  68  10-05    LIVER FUNCTIONS - ( 05 Oct 2019 11:22 )  Alb: 3.7 g/dL / Pro: 8.1 g/dL / ALK PHOS: 68 U/L / ALT: 17 U/L DA / AST: 18 U/L / GGT: x               RADIOLOGY REVIEWED:  < from: CT Head No Cont (10.05.19 @ 16:41) >    EXAM:  CT BRAIN                            PROCEDURE DATE:  10/05/2019            INTERPRETATION:  CLINICAL INFORMATION: Follow-up subdural hemorrhage    TECHNIQUE: Noncontrast axial CT images were acquired through the head.   Two-dimensional sagittal and coronal reformats were generated.    COMPARISON STUDY: Head CT 10/5/2019 11:41 AM.    FINDINGS: A right mainly transverse    Unchanged parafalcine subdural hemorrhage with extension into the left   tentorial leaflet.    No acute intraparenchymal hemorrhage, mass effect or midline shift.    Mild prominence of ventricles and sulci consistent with age-related   involutional change. Nonspecific patchy white matter hypoattenuation   likely related to chronic vascular ischemic disease.     The visualized paranasal sinuses are clear. The mastoid air cells and   middle ear cavities are clear.    The soft tissues of the scalp are unremarkable. The calvarium is intact.    IMPRESSION:   Unchanged acute subdural hemorrhage along the falx and left territorial   leaflet.      < end of copied text >  < from: Xray Chest 1 View AP/PA (10.05.19 @ 11:54) >  EXAM:  XR CHEST AP OR PA 1V                                  PROCEDURE DATE:  10/05/2019          INTERPRETATION:  CLINICAL INFORMATION: Weakness.    A single portable view of the chest was obtained.     Comparison: None.     The mediastinal cardiac silhouette is unremarkable.    The lungs are clear.     No acute osseous finding.     IMPRESSION:    No acute process.      < end of copied text >          Impression: patient with spontaneous sdh. She has mild leukocytosis which I suspect is reactive to the bleed in brain. She has no iph or sah to suggest mycotic aneurysm rupture or any constitutional symptoms to suggest endocarditis or subdural empyema.     Recommendations:  monitor off antibiotics  check blood cultures  favor mri/mra of the brain to be sure no underlying lesion not seen on ct since we dont have a reason for the sdh.

## 2019-10-06 NOTE — CONSULT NOTE ADULT - ASSESSMENT
83F PMHx HTN, HLD, DM2, hypothyroidism transfer from [facility] atraumatic subdural hematoma in setting hypertension sBP 220 (not on blood thinner) for possible neurosurgical intervention however upon arrival to [facility] patient return to baseline.  Neurosurgery consult, no acute intervention.      Nephrology consulted for hyponatremia, on admission serum sodium 139, today downtrend 126 (corrected), normal SCr 0.9.  AAOx3

## 2019-10-06 NOTE — PROGRESS NOTE ADULT - ASSESSMENT
83 F presents w/ confusion transferred  from [facility] ,  on cardene drip.    Pt is not on thinners, atraumatic head bleed/  SDH  Family brought pt in, as  pt was  not answering questions normally/  s/p  brief  confusion  , Pt has a pmh of  DM 2, , HTN, HLD   on baby aspirin last dose was day prior to  admission  At   [place], pt was hypertensive w/ SBP 220s,  therfore  pt  wa s tranferred  to  NSG at [facility] who recommended cardene,/     drip wa s stopped	  in er,  alert/  talking well/ no  cp/sob/ headaches     ct head  noted/  rpt ct head  , no  change    CT  with  SDH,   left  tentorium  and  falx   no intervention, per  neurosurgery   hold  asa   HTN,  meds  adjusted    DM 2, follow fs  mechanical  dvt ppx  PT  eval/  await disposition  follow  wbc  and  sodium     < from: CT Head No Cont (10.05.19 @ 16:41) >  IMPRESSION:   Unchanged acute subdural hemorrhage along the falx and left territorial   leaflet.  < end of copied text > 83 F presents w/ confusion transferred  from [facility] ,  on cardene drip.    Pt is not on thinners, atraumatic head bleed/  SDH  Family brought pt in, as  pt was  not answering questions normally/  s/p  brief  confusion  , Pt has a pmh of  DM 2, , HTN, HLD   on baby aspirin last dose was day prior to  admission  At   [place], pt was hypertensive w/ SBP 220s,  therfore  pt  wa s tranferred  to  NSG at [facility] who recommended cardene,/     drip wa s stopped	  in er,  alert/  talking well/ no  cp/sob/ headaches     ct head  noted/  rpt ct head  , no  change    CT  with  SDH,   left  tentorium  and  falx   no intervention, per  neurosurgery   hold  asa   HTN,  meds  adjusted    DM 2, follow fs  mechanical  dvt ppx  PT  eval/  await disposition  worsening hyponatremia/  pt is euvolemic. on  urena 15  gm, bid/  bmp in am  spoke  with pharmacist     < from: CT Head No Cont (10.05.19 @ 16:41) >  IMPRESSION:   Unchanged acute subdural hemorrhage along the falx and left territorial   leaflet.  < end of copied text >

## 2019-10-06 NOTE — PROGRESS NOTE ADULT - SUBJECTIVE AND OBJECTIVE BOX
CARDIOLOGY     PROGRESS  NOTE   ________________________________________________    CHIEF COMPLAINT:Patient is a 83y old  Female who presents with a chief complaint of confussion (06 Oct 2019 10:00)  no complain.  	  REVIEW OF SYSTEMS:  CONSTITUTIONAL: No fever, weight loss, or fatigue  EYES: No eye pain, visual disturbances, or discharge  ENT:  No difficulty hearing, tinnitus, vertigo; No sinus or throat pain  NECK: No pain or stiffness  RESPIRATORY: No cough, wheezing, chills or hemoptysis; No Shortness of Breath  CARDIOVASCULAR: No chest pain, palpitations, passing out, dizziness, or leg swelling  GASTROINTESTINAL: No abdominal or epigastric pain. No nausea, vomiting, or hematemesis; No diarrhea or constipation. No melena or hematochezia.  GENITOURINARY: No dysuria, frequency, hematuria, or incontinence  NEUROLOGICAL: No headaches, memory loss, loss of strength, numbness, or tremors  SKIN: No itching, burning, rashes, or lesions   LYMPH Nodes: No enlarged glands  ENDOCRINE: No heat or cold intolerance; No hair loss  MUSCULOSKELETAL: No joint pain or swelling; No muscle, back, or extremity pain  PSYCHIATRIC: No depression, anxiety, mood swings, or difficulty sleeping  HEME/LYMPH: No easy bruising, or bleeding gums  ALLERGY AND IMMUNOLOGIC: No hives or eczema	    [ ] All others negative	  [ ] Unable to obtain    PHYSICAL EXAM:  T(C): 37 (10-06-19 @ 04:01), Max: 37.3 (10-05-19 @ 13:53)  HR: 80 (10-06-19 @ 04:01) (68 - 96)  BP: 146/68 (10-06-19 @ 04:01) (139/75 - 220/105)  RR: 18 (10-06-19 @ 04:01) (14 - 20)  SpO2: 98% (10-06-19 @ 04:01) (97% - 100%)  Wt(kg): --  I&O's Summary    05 Oct 2019 07:01  -  06 Oct 2019 07:00  --------------------------------------------------------  IN: 240 mL / OUT: 0 mL / NET: 240 mL        Appearance: Normal	  HEENT:   Normal oral mucosa, PERRL, EOMI	  Lymphatic: No lymphadenopathy  Cardiovascular: Normal S1 S2, No JVD, + murmurs, No edema  Respiratory: Lungs clear to auscultation	  Psychiatry: A & O x 3, Mood & affect appropriate  Gastrointestinal:  Soft, Non-tender, + BS	  Skin: No rashes, No ecchymoses, No cyanosis	  Neurologic: Non-focal  Extremities: Normal range of motion, No clubbing, cyanosis or edema  Vascular: Peripheral pulses palpable 2+ bilaterally    MEDICATIONS  (STANDING):  dextrose 5%. 1000 milliLiter(s) (50 mL/Hr) IV Continuous <Continuous>  dextrose 50% Injectable 12.5 Gram(s) IV Push once  dextrose 50% Injectable 25 Gram(s) IV Push once  dextrose 50% Injectable 25 Gram(s) IV Push once  furosemide    Tablet 20 milliGRAM(s) Oral daily  insulin lispro (HumaLOG) corrective regimen sliding scale   SubCutaneous three times a day before meals  levothyroxine 112 MICROGram(s) Oral daily  lisinopril 20 milliGRAM(s) Oral daily  metoprolol succinate ER 75 milliGRAM(s) Oral daily  simvastatin 5 milliGRAM(s) Oral at bedtime      TELEMETRY: 	    ECG:  	  RADIOLOGY:  OTHER: 	  	  LABS:	 	    CARDIAC MARKERS:  CARDIAC MARKERS ( 05 Oct 2019 11:22 )  .000 ng/mL / x     / x     / x     / x                                    11.5   12.87 )-----------( 263      ( 06 Oct 2019 09:10 )             36.5     10-06    126<L>  |  94<L>  |  13  ----------------------------<  182<H>  3.7   |  21<L>  |  0.67    Ca    8.8      06 Oct 2019 05:24    TPro  8.1  /  Alb  3.7  /  TBili  0.4  /  DBili  x   /  AST  18  /  ALT  17  /  AlkPhos  68  10-05    proBNP:   Lipid Profile:   HgA1c:   TSH:   PT/INR - ( 05 Oct 2019 12:45 )   PT: 11.7 sec;   INR: 1.07 ratio         PTT - ( 05 Oct 2019 12:45 )  PTT:31.0 sec  < from: CT Head No Cont (10.05.19 @ 16:41) >  Unchanged acute subdural hemorrhage along the falx and left territorial   leaflet.      < end of copied text >      Assessment and plan  ---------------------------   83  F presents w/ confusion, from [facility] transferred on [place] drip. Pt is not on thinners, atraumatic head bleed Family brought pt in not answering questions normally, Pt has a pmh of  DM, HTN, HLD  on baby aspirin last dose was yesterday in the evening  upon arrival to [place], pt was hypertensive w/ SBP 220s, called NSG at [facility] who recommended [place]  uncontrolled htn  continue beta blocker and ace if not controlled will consider iv [place]  repeat head ct scan  neuro check  tele  echo  lipid panel  tsh  hyponatremia ? sec SIADH ???  repeat ,?ns  repeat in the afternoon  keep sbp 130 to 140

## 2019-10-06 NOTE — CONSULT NOTE ADULT - ATTENDING COMMENTS
I have seen this patient with the fellow and agree with their assessment and plan. In addition, acute hyponatremia in setting of being normal and now 123 range from 145 range in 72 hours likely from increased ADH from brain bleeding, nausea and increased water intake. She is not on a thiazide but was on lasix at home.      Rule out Adrenal and or thyroid conditions  checking urine osm urine na and sosm  can increase Protein in the diet and start ure-na 15gm BID   cannot do na tabs given likely neurological bleed from severe HTN  give additional dose of lasix 20mg as well   zofran for better n/v control      Niocle Quiroga MD  Cell   Pager   Office  I have seen this patient with the fellow and agree with their assessment and plan. In addition, acute hyponatremia in setting of being normal and now 123 range from 145 range in 72 hours likely from increased ADH from brain bleeding, nausea and increased water intake. She is not on a thiazide but was on lasix at home.      Rule out Adrenal and or thyroid conditions  checking urine osm urine na and sosm  can increase Protein in the diet and start ure-na 15gm BID   cannot do na tabs given her HTN  HTN usually doesn't cause a SDH, look for other causes for Subdural hematoma - might require MRI of the brain.  give additional dose of lasix 20mg as well   zofran for better n/v control      Nicole Quiroga MD  Cell   Pager   Office

## 2019-10-06 NOTE — CONSULT NOTE ADULT - SUBJECTIVE AND OBJECTIVE BOX
[facility] DIVISION OF KIDNEY DISEASES AND HYPERTENSION -- INITIAL CONSULT NOTE  --------------------------------------------------------------------------------  HPI:  83F PMHx HTN, HLD, DM2, hypothyroidism who initially present to [facility] headache/nausea found to be hypertensive sBP 220 and CT head showed atraumatic subdural hematoma (not on blood thinner) transfer to [facility] on cardene drip for possible neurosurgical intervention however upon arrival to [facility] patient return to baseline.  Neurosurgery consult, no acute intervention.      Nephrology consulted for hyponatremia, on admission serum sodium 139, today downtrend 126 (corrected), normal SCr 0.9.    At time of my evaluation, patient endorse nausea only otherwise denies any diuretic, new medications/change, hx hyponatremia or malignancy, increase IV fluid intake, chest pain, sob, vomiting, diarrhea, fever, chills.        PAST HISTORY  --------------------------------------------------------------------------------  PAST MEDICAL & SURGICAL HISTORY:  Primary hyperparathyroidism  HTN (hypertension)  Diabetes mellitus  Hyperlipidemia  Hypothyroid  H/O fracture of humerus  S/P cholecystectomy    FAMILY HISTORY:    PAST SOCIAL HISTORY:    ALLERGIES & MEDICATIONS  --------------------------------------------------------------------------------  Allergies    penicillin (Muscle Pain)    Intolerances      Standing Inpatient Medications  dextrose 5%. 1000 milliLiter(s) IV Continuous <Continuous>  dextrose 50% Injectable 12.5 Gram(s) IV Push once  dextrose 50% Injectable 25 Gram(s) IV Push once  dextrose 50% Injectable 25 Gram(s) IV Push once  furosemide    Tablet 20 milliGRAM(s) Oral daily  insulin lispro (HumaLOG) corrective regimen sliding scale   SubCutaneous three times a day before meals  levothyroxine 112 MICROGram(s) Oral daily  lisinopril 20 milliGRAM(s) Oral daily  metoprolol succinate ER 75 milliGRAM(s) Oral daily  simvastatin 5 milliGRAM(s) Oral at bedtime  Ure-Na 15gram 1 Packet(s) 1 Packet(s) Oral two times a day    PRN Inpatient Medications  dextrose 40% Gel 15 Gram(s) Oral once PRN  glucagon  Injectable 1 milliGRAM(s) IntraMuscular once PRN      REVIEW OF SYSTEMS  --------------------------------------------------------------------------------  Gen: No fatigue, fevers/chills, weakness  Respiratory: No dyspnea, cough  CV: No chest pain  GI: + nausea.  No abdominal pain, diarrhea, constipation, vomiting  : No increased frequency, dysuria, hematuria, nocturia  MSK: no edema  Neuro: No dizziness/lightheadedness      All other systems were reviewed and are negative, except as noted.    VITALS/PHYSICAL EXAM  --------------------------------------------------------------------------------  T(C): 36.7 (10-06-19 @ 11:57), Max: 37.3 (10-05-19 @ 16:45)  HR: 71 (10-06-19 @ 11:57) (71 - 94)  BP: 177/84 (10-06-19 @ 11:57) (142/72 - 177/84)  RR: 18 (10-06-19 @ 11:57) (17 - 20)  SpO2: 96% (10-06-19 @ 11:57) (96% - 100%)  Wt(kg): --  Height (cm): 172.72 (10-05-19 @ 10:48)  Weight (kg): 72.6 (10-05-19 @ 10:48)  BMI (kg/m2): 24.3 (10-05-19 @ 10:48)  BSA (m2): 1.86 (10-05-19 @ 10:48)      10-05-19 @ 07:01  -  10-06-19 @ 07:00  --------------------------------------------------------  IN: 240 mL / OUT: 0 mL / NET: 240 mL      Physical Exam:  	Gen: NAD, well-appearing on room air, AAOx3  	HEENT: clear oropharynx  	Pulm: CTA B/L  	CV: RRR, S1S2  	Abd: +BS, soft, nontender/nondistended  	LE: Warm, no edema, no asterixis     LABS/STUDIES  --------------------------------------------------------------------------------              11.5   12.87 >-----------<  263      [10-06-19 @ 09:10]              36.5     123  |  92  |  13  ----------------------------<  217      [10-06-19 @ 10:48]  3.6   |  20  |  0.60        Ca     8.8     [10-06-19 @ 10:48]    TPro  8.1  /  Alb  3.7  /  TBili  0.4  /  DBili  x   /  AST  18  /  ALT  17  /  AlkPhos  68  [10-05-19 @ 11:22]    PT/INR: PT 11.7 , INR 1.07       [10-05-19 @ 12:45]  PTT: 31.0       [10-05-19 @ 12:45]    Troponin .000      [10-05-19 @ 11:22]    Creatinine Trend:  SCr 0.60 [10-06 @ 10:48]  SCr 0.67 [10-06 @ 05:24]  SCr 0.92 [10-05 @ 11:22]        HbA1c 7.4      [10-06-19 @ 09:10] [facility] DIVISION OF KIDNEY DISEASES AND HYPERTENSION -- INITIAL CONSULT NOTE  --------------------------------------------------------------------------------  HPI:  83F PMHx HTN, HLD, DM2, hypothyroidism who initially present to [facility] headache/nausea found to be hypertensive sBP 220 and CT head showed atraumatic subdural hematoma (not on blood thinner) transfer to [facility] on cardene drip for possible neurosurgical intervention however upon arrival to [facility] patient return to baseline.  Neurosurgery consult, no acute intervention.    Nephrology consulted for hyponatremia, on admission serum sodium 139, today downtrend 126 (corrected), normal SCr 0.9.  At time of my evaluation, patient endorse nausea only otherwise denies any diuretic, new medications/change, hx hyponatremia or malignancy, increase IV fluid intake, chest pain, sob, vomiting, diarrhea, fever, chills.        PAST HISTORY  --------------------------------------------------------------------------------  PAST MEDICAL & SURGICAL HISTORY:  Primary hyperparathyroidism  HTN (hypertension)  Diabetes mellitus  Hyperlipidemia  Hypothyroid  H/O fracture of humerus  S/P cholecystectomy    FAMILY HISTORY:    PAST SOCIAL HISTORY:    ALLERGIES & MEDICATIONS  --------------------------------------------------------------------------------  Allergies    penicillin (Muscle Pain)    Intolerances      Standing Inpatient Medications  dextrose 5%. 1000 milliLiter(s) IV Continuous <Continuous>  dextrose 50% Injectable 12.5 Gram(s) IV Push once  dextrose 50% Injectable 25 Gram(s) IV Push once  dextrose 50% Injectable 25 Gram(s) IV Push once  furosemide    Tablet 20 milliGRAM(s) Oral daily  insulin lispro (HumaLOG) corrective regimen sliding scale   SubCutaneous three times a day before meals  levothyroxine 112 MICROGram(s) Oral daily  lisinopril 20 milliGRAM(s) Oral daily  metoprolol succinate ER 75 milliGRAM(s) Oral daily  simvastatin 5 milliGRAM(s) Oral at bedtime  Ure-Na 15gram 1 Packet(s) 1 Packet(s) Oral two times a day    PRN Inpatient Medications  dextrose 40% Gel 15 Gram(s) Oral once PRN  glucagon  Injectable 1 milliGRAM(s) IntraMuscular once PRN      REVIEW OF SYSTEMS  --------------------------------------------------------------------------------  Gen: No fatigue, fevers/chills, weakness  Respiratory: No dyspnea, cough  CV: No chest pain  GI: + nausea.  No abdominal pain, diarrhea, constipation, vomiting  : No increased frequency, dysuria, hematuria, nocturia  MSK: no edema  Neuro: No dizziness/lightheadedness      All other systems were reviewed and are negative, except as noted.    VITALS/PHYSICAL EXAM  --------------------------------------------------------------------------------  T(C): 36.7 (10-06-19 @ 11:57), Max: 37.3 (10-05-19 @ 16:45)  HR: 71 (10-06-19 @ 11:57) (71 - 94)  BP: 177/84 (10-06-19 @ 11:57) (142/72 - 177/84)  RR: 18 (10-06-19 @ 11:57) (17 - 20)  SpO2: 96% (10-06-19 @ 11:57) (96% - 100%)  Wt(kg): --  Height (cm): 172.72 (10-05-19 @ 10:48)  Weight (kg): 72.6 (10-05-19 @ 10:48)  BMI (kg/m2): 24.3 (10-05-19 @ 10:48)  BSA (m2): 1.86 (10-05-19 @ 10:48)      10-05-19 @ 07:01  -  10-06-19 @ 07:00  --------------------------------------------------------  IN: 240 mL / OUT: 0 mL / NET: 240 mL      Physical Exam:  	Gen: NAD, well-appearing on room air, AAOx3  	HEENT: clear oropharynx  	Pulm: CTA B/L  	CV: RRR, S1S2  	Abd: +BS, soft, nontender/nondistended  	LE: Warm, no edema, no asterixis     LABS/STUDIES  --------------------------------------------------------------------------------              11.5   12.87 >-----------<  263      [10-06-19 @ 09:10]              36.5     123  |  92  |  13  ----------------------------<  217      [10-06-19 @ 10:48]  3.6   |  20  |  0.60        Ca     8.8     [10-06-19 @ 10:48]    TPro  8.1  /  Alb  3.7  /  TBili  0.4  /  DBili  x   /  AST  18  /  ALT  17  /  AlkPhos  68  [10-05-19 @ 11:22]    PT/INR: PT 11.7 , INR 1.07       [10-05-19 @ 12:45]  PTT: 31.0       [10-05-19 @ 12:45]    Troponin .000      [10-05-19 @ 11:22]    Creatinine Trend:  SCr 0.60 [10-06 @ 10:48]  SCr 0.67 [10-06 @ 05:24]  SCr 0.92 [10-05 @ 11:22]        HbA1c 7.4      [10-06-19 @ 09:10]

## 2019-10-06 NOTE — CHART NOTE - NSCHARTNOTEFT_GEN_A_CORE
PA Medicine Event Note    Na level this  with repeat 123. Patient NAD, complaining of nausea.  Nephrology consult called: additional Lasix 20 PO, ure-na 15gm BID, ensure TID, ordered.  Zofran 4 mg IV given for nausea. Per nephro, Zofran prn.  BP also elevated :180/80. + nausea. Patient denies any HA, CP, palpitations, dizziness .Discussed with Dr. England to give additional 25 mg Toprol (50 mg given earlier).   Patient will have 75 mg Toprol daily beginning tomorrow.  Will continue to monitor and will endorse to night team.     Vital Signs Last 24 Hrs  T(C): 36.7 (06 Oct 2019 11:57), Max: 37.3 (05 Oct 2019 16:45)  T(F): 98.1 (06 Oct 2019 11:57), Max: 99.2 (05 Oct 2019 16:45)  HR: 71 (06 Oct 2019 11:57) (71 - 94)  BP: 180/80 (06 Oct 2019 14:53) (142/72 - 180/80)  BP(mean): --  RR: 18 (06 Oct 2019 11:57) (17 - 20)  SpO2: 96% (06 Oct 2019 11:57) (96% - 100%)    PHYSICAL EXAM:  GENERAL: Laying down, in no acute distress  PSYCH: AAOx3  HEAD:  Atraumatic, Normocephalic  EYES: EOMI, PERRLA, conjunctiva and sclera clear  NECK: Supple, No JVD  CHEST/LUNG: Breath sounds clear to auscultation bilaterally.  No wheezes, rales, rhonchi or crackles  HEART: +S1/S2.  Regular rate and rhythm.  No murmurs, rubs or gallops  ABDOMEN: Bowel sounds present in all 4 quadrants.  Soft, Nontender, Nondistended  EXTREMITIES: No edema or erythema noted in bilateral lower extremities  NEUROLOGY: Cranial nerves 2-12 grossly intact  SKIN: No rashes or lesions    Mindi Antony PA-C  Dept of Medicine  #[id number]

## 2019-10-06 NOTE — PROGRESS NOTE ADULT - SUBJECTIVE AND OBJECTIVE BOX
tele, nsr    REVIEW OF SYSTEMS:  GEN: no fever,    no chills  RESP: no SOB,   no cough  CVS: no chest pain,   no palpitations  GI: no abdominal pain,   no nausea,   no vomiting,   no constipation,   no diarrhea  : no dysuria,   no frequency  NEURO: no headache,   no dizziness  PSYCH: no depression,   not anxious  Derm : no rash    MEDICATIONS  (STANDING):  dextrose 5%. 1000 milliLiter(s) (50 mL/Hr) IV Continuous <Continuous>  dextrose 50% Injectable 12.5 Gram(s) IV Push once  dextrose 50% Injectable 25 Gram(s) IV Push once  dextrose 50% Injectable 25 Gram(s) IV Push once  furosemide    Tablet 20 milliGRAM(s) Oral daily  insulin lispro (HumaLOG) corrective regimen sliding scale   SubCutaneous three times a day before meals  levothyroxine 112 MICROGram(s) Oral daily  lisinopril 20 milliGRAM(s) Oral daily  metoprolol succinate ER 50 milliGRAM(s) Oral daily  simvastatin 5 milliGRAM(s) Oral at bedtime    MEDICATIONS  (PRN):  dextrose 40% Gel 15 Gram(s) Oral once PRN Blood Glucose LESS THAN 70 milliGRAM(s)/deciliter  glucagon  Injectable 1 milliGRAM(s) IntraMuscular once PRN Glucose LESS THAN 70 milligrams/deciliter      Vital Signs Last 24 Hrs  T(C): 37 (06 Oct 2019 04:01), Max: 37.3 (05 Oct 2019 13:53)  T(F): 98.6 (06 Oct 2019 04:01), Max: 99.2 (05 Oct 2019 16:45)  HR: 80 (06 Oct 2019 04:01) (68 - 96)  BP: 146/68 (06 Oct 2019 04:01) (139/75 - 220/105)  BP(mean): --  RR: 18 (06 Oct 2019 04:01) (14 - 20)  SpO2: 98% (06 Oct 2019 04:01) (97% - 100%)  CAPILLARY BLOOD GLUCOSE      POCT Blood Glucose.: 199 mg/dL (06 Oct 2019 07:51)  POCT Blood Glucose.: 200 mg/dL (05 Oct 2019 21:06)  POCT Blood Glucose.: 210 mg/dL (05 Oct 2019 20:09)  POCT Blood Glucose.: 195 mg/dL (05 Oct 2019 16:30)  POCT Blood Glucose.: 178 mg/dL (05 Oct 2019 10:59)    I&O's Summary    05 Oct 2019 07:01  -  06 Oct 2019 07:00  --------------------------------------------------------  IN: 240 mL / OUT: 0 mL / NET: 240 mL        PHYSICAL EXAM:  HEAD:  Atraumatic, Normocephalic  NECK: Supple, No   JVD  CHEST/LUNG:   no     rales,     no,    rhonchi  HEART: Regular rate and rhythm;         murmur  ABDOMEN: Soft, Nontender, ;   EXTREMITIES:     no   edema  NEUROLOGY:  alert/ no deficits    LABS:                        11.5   12.87 )-----------( 263      ( 06 Oct 2019 09:10 )             36.5     10-06    126<L>  |  94<L>  |  13  ----------------------------<  182<H>  3.7   |  21<L>  |  0.67    Ca    8.8      06 Oct 2019 05:24    TPro  8.1  /  Alb  3.7  /  TBili  0.4  /  DBili  x   /  AST  18  /  ALT  17  /  AlkPhos  68  10-05    PT/INR - ( 05 Oct 2019 12:45 )   PT: 11.7 sec;   INR: 1.07 ratio         PTT - ( 05 Oct 2019 12:45 )  PTT:31.0 sec  CARDIAC MARKERS ( 05 Oct 2019 11:22 )  .000 ng/mL / x     / x     / x     / x                            Consultant(s) Notes Reviewed:      Care Discussed with Consultants/Other Providers:

## 2019-10-07 LAB
ANION GAP SERPL CALC-SCNC: 12 MMOL/L — SIGNIFICANT CHANGE UP (ref 5–17)
ANION GAP SERPL CALC-SCNC: 13 MMOL/L — SIGNIFICANT CHANGE UP (ref 5–17)
ANION GAP SERPL CALC-SCNC: 14 MMOL/L — SIGNIFICANT CHANGE UP (ref 5–17)
BUN SERPL-MCNC: 13 MG/DL — SIGNIFICANT CHANGE UP (ref 7–23)
BUN SERPL-MCNC: 16 MG/DL — SIGNIFICANT CHANGE UP (ref 7–23)
BUN SERPL-MCNC: 20 MG/DL — SIGNIFICANT CHANGE UP (ref 7–23)
CALCIUM SERPL-MCNC: 8.6 MG/DL — SIGNIFICANT CHANGE UP (ref 8.4–10.5)
CALCIUM SERPL-MCNC: 9.2 MG/DL — SIGNIFICANT CHANGE UP (ref 8.4–10.5)
CALCIUM SERPL-MCNC: 9.8 MG/DL — SIGNIFICANT CHANGE UP (ref 8.4–10.5)
CHLORIDE SERPL-SCNC: 86 MMOL/L — LOW (ref 96–108)
CHLORIDE SERPL-SCNC: 89 MMOL/L — LOW (ref 96–108)
CHLORIDE SERPL-SCNC: 91 MMOL/L — LOW (ref 96–108)
CHLORIDE UR-SCNC: 89 MMOL/L — SIGNIFICANT CHANGE UP
CO2 SERPL-SCNC: 21 MMOL/L — LOW (ref 22–31)
CO2 SERPL-SCNC: 22 MMOL/L — SIGNIFICANT CHANGE UP (ref 22–31)
CO2 SERPL-SCNC: 23 MMOL/L — SIGNIFICANT CHANGE UP (ref 22–31)
CORTIS AM PEAK SERPL-MCNC: 11.5 UG/DL — SIGNIFICANT CHANGE UP (ref 6–18.4)
CREAT ?TM UR-MCNC: 39 MG/DL — SIGNIFICANT CHANGE UP
CREAT SERPL-MCNC: 0.59 MG/DL — SIGNIFICANT CHANGE UP (ref 0.5–1.3)
CREAT SERPL-MCNC: 0.61 MG/DL — SIGNIFICANT CHANGE UP (ref 0.5–1.3)
CREAT SERPL-MCNC: 0.64 MG/DL — SIGNIFICANT CHANGE UP (ref 0.5–1.3)
GLUCOSE BLDC GLUCOMTR-MCNC: 192 MG/DL — HIGH (ref 70–99)
GLUCOSE BLDC GLUCOMTR-MCNC: 206 MG/DL — HIGH (ref 70–99)
GLUCOSE BLDC GLUCOMTR-MCNC: 215 MG/DL — HIGH (ref 70–99)
GLUCOSE BLDC GLUCOMTR-MCNC: 278 MG/DL — HIGH (ref 70–99)
GLUCOSE SERPL-MCNC: 189 MG/DL — HIGH (ref 70–99)
GLUCOSE SERPL-MCNC: 197 MG/DL — HIGH (ref 70–99)
GLUCOSE SERPL-MCNC: 235 MG/DL — HIGH (ref 70–99)
HCT VFR BLD CALC: 39.8 % — SIGNIFICANT CHANGE UP (ref 34.5–45)
HGB BLD-MCNC: 12.8 G/DL — SIGNIFICANT CHANGE UP (ref 11.5–15.5)
MCHC RBC-ENTMCNC: 27.6 PG — SIGNIFICANT CHANGE UP (ref 27–34)
MCHC RBC-ENTMCNC: 32.2 GM/DL — SIGNIFICANT CHANGE UP (ref 32–36)
MCV RBC AUTO: 85.8 FL — SIGNIFICANT CHANGE UP (ref 80–100)
OSMOLALITY SERPL: 271 MOSMOL/KG — LOW (ref 280–301)
OSMOLALITY UR: 459 MOSM/KG — SIGNIFICANT CHANGE UP (ref 50–1200)
PLATELET # BLD AUTO: 290 K/UL — SIGNIFICANT CHANGE UP (ref 150–400)
POTASSIUM SERPL-MCNC: 3.1 MMOL/L — LOW (ref 3.5–5.3)
POTASSIUM SERPL-MCNC: 3.5 MMOL/L — SIGNIFICANT CHANGE UP (ref 3.5–5.3)
POTASSIUM SERPL-MCNC: 3.7 MMOL/L — SIGNIFICANT CHANGE UP (ref 3.5–5.3)
POTASSIUM SERPL-SCNC: 3.1 MMOL/L — LOW (ref 3.5–5.3)
POTASSIUM SERPL-SCNC: 3.5 MMOL/L — SIGNIFICANT CHANGE UP (ref 3.5–5.3)
POTASSIUM SERPL-SCNC: 3.7 MMOL/L — SIGNIFICANT CHANGE UP (ref 3.5–5.3)
POTASSIUM UR-SCNC: 35 MMOL/L — SIGNIFICANT CHANGE UP
PROT ?TM UR-MCNC: 32 MG/DL — HIGH (ref 0–12)
PROT/CREAT UR-RTO: 0.8 RATIO — HIGH (ref 0–0.2)
RBC # BLD: 4.64 M/UL — SIGNIFICANT CHANGE UP (ref 3.8–5.2)
RBC # FLD: 13.7 % — SIGNIFICANT CHANGE UP (ref 10.3–14.5)
SODIUM SERPL-SCNC: 120 MMOL/L — CRITICAL LOW (ref 135–145)
SODIUM SERPL-SCNC: 125 MMOL/L — LOW (ref 135–145)
SODIUM SERPL-SCNC: 126 MMOL/L — LOW (ref 135–145)
SODIUM UR-SCNC: 89 MMOL/L — SIGNIFICANT CHANGE UP
TSH SERPL-MCNC: 2.05 UIU/ML — SIGNIFICANT CHANGE UP (ref 0.27–4.2)
WBC # BLD: 11 K/UL — HIGH (ref 3.8–10.5)
WBC # FLD AUTO: 11 K/UL — HIGH (ref 3.8–10.5)

## 2019-10-07 PROCEDURE — 99232 SBSQ HOSP IP/OBS MODERATE 35: CPT | Mod: GC

## 2019-10-07 RX ORDER — FUROSEMIDE 40 MG
40 TABLET ORAL
Refills: 0 | Status: DISCONTINUED | OUTPATIENT
Start: 2019-10-07 | End: 2019-10-08

## 2019-10-07 RX ORDER — SODIUM CHLORIDE 9 MG/ML
1 INJECTION INTRAMUSCULAR; INTRAVENOUS; SUBCUTANEOUS
Refills: 0 | Status: DISCONTINUED | OUTPATIENT
Start: 2019-10-07 | End: 2019-10-08

## 2019-10-07 RX ORDER — POTASSIUM CHLORIDE 20 MEQ
40 PACKET (EA) ORAL EVERY 4 HOURS
Refills: 0 | Status: COMPLETED | OUTPATIENT
Start: 2019-10-07 | End: 2019-10-07

## 2019-10-07 RX ADMIN — Medication 40 MILLIEQUIVALENT(S): at 06:11

## 2019-10-07 RX ADMIN — Medication 3: at 12:30

## 2019-10-07 RX ADMIN — Medication 20 MILLIGRAM(S): at 05:42

## 2019-10-07 RX ADMIN — Medication 40 MILLIGRAM(S): at 17:14

## 2019-10-07 RX ADMIN — Medication 75 MILLIGRAM(S): at 05:42

## 2019-10-07 RX ADMIN — LISINOPRIL 20 MILLIGRAM(S): 2.5 TABLET ORAL at 05:42

## 2019-10-07 RX ADMIN — Medication 2: at 08:20

## 2019-10-07 RX ADMIN — SODIUM CHLORIDE 1 GRAM(S): 9 INJECTION INTRAMUSCULAR; INTRAVENOUS; SUBCUTANEOUS at 22:22

## 2019-10-07 RX ADMIN — Medication 112 MICROGRAM(S): at 05:42

## 2019-10-07 RX ADMIN — ONDANSETRON 4 MILLIGRAM(S): 8 TABLET, FILM COATED ORAL at 05:42

## 2019-10-07 RX ADMIN — SIMVASTATIN 5 MILLIGRAM(S): 20 TABLET, FILM COATED ORAL at 22:22

## 2019-10-07 RX ADMIN — Medication 40 MILLIEQUIVALENT(S): at 11:24

## 2019-10-07 RX ADMIN — Medication 2: at 17:13

## 2019-10-07 NOTE — PHYSICAL THERAPY INITIAL EVALUATION ADULT - PLANNED THERAPY INTERVENTIONS, PT EVAL
bed mobility training/gait training/balance training/GOAL: Pt will negotiate 12 stairs with no HR and step to pattern with (I) in 2 weeks./transfer training

## 2019-10-07 NOTE — PROGRESS NOTE ADULT - ASSESSMENT
83F PMHx HTN, HLD, DM2, hypothyroidism transfer from [facility] atraumatic subdural hematoma in setting hypertension sBP 220 (not on blood thinner) for possible neurosurgical intervention however upon arrival to [facility] patient return to baseline.  Neurosurgery consult, no acute intervention.  Nephrology consulted for hyponatremia, on admission serum sodium 139, today downtrend 125 (corrected), normal SCr.

## 2019-10-07 NOTE — OCCUPATIONAL THERAPY INITIAL EVALUATION ADULT - PLANNED THERAPY INTERVENTIONS, OT EVAL
bed mobility training/ADL retraining/transfer training/cognitive, visual perceptual/balance training

## 2019-10-07 NOTE — PROGRESS NOTE ADULT - SUBJECTIVE AND OBJECTIVE BOX
CC: f/u for SDH    Patient reports  feels ok today just poor appetite  REVIEW OF SYSTEMS:  All other review of systems negative (Comprehensive ROS)    Antimicrobials Day #  :    Other Medications Reviewed    T(F): 98.5 (10-07-19 @ 11:35), Max: 98.5 (10-07-19 @ 11:35)  HR: 68 (10-07-19 @ 11:35)  BP: 165/66 (10-07-19 @ 11:35)  RR: 18 (10-07-19 @ 11:35)  SpO2: 95% (10-07-19 @ 11:35)  Wt(kg): --    PHYSICAL EXAM:  General: alert, no acute distress  Eyes:  anicteric, no conjunctival injection, no discharge  Oropharynx: no lesions or injection 	  Neck: supple, without adenopathy  Lungs: clear to auscultation  Heart: regular rate and rhythm; no murmur, rubs or gallops  Abdomen: soft, nondistended, nontender, without mass or organomegaly  Skin: no lesions  Extremities: no clubbing, cyanosis, or edema  Neurologic: alert, oriented, moves all extremities    LAB RESULTS:                        12.8   11.00 )-----------( 290      ( 07 Oct 2019 08:42 )             39.8     10-07    125<L>  |  89<L>  |  13  ----------------------------<  197<H>  3.5   |  23  |  0.61    Ca    9.2      07 Oct 2019 05:58          MICROBIOLOGY:  RECENT CULTURES:      RADIOLOGY REVIEWED:  < from: CT Head No Cont (10.05.19 @ 16:41) >    EXAM:  CT BRAIN                            PROCEDURE DATE:  10/05/2019            INTERPRETATION:  CLINICAL INFORMATION: Follow-up subdural hemorrhage    TECHNIQUE: Noncontrast axial CT images were acquired through the head.   Two-dimensional sagittal and coronal reformats were generated.    COMPARISON STUDY: Head CT 10/5/2019 11:41 AM.    FINDINGS: A right mainly transverse    Unchanged parafalcine subdural hemorrhage with extension into the left   tentorial leaflet.    No acute intraparenchymal hemorrhage, mass effect or midline shift.    Mild prominence of ventricles and sulci consistent with age-related   involutional change. Nonspecific patchy white matter hypoattenuation   likely related to chronic vascular ischemic disease.     The visualized paranasal sinuses are clear. The mastoid air cells and   middle ear cavities are clear.    The soft tissues of the scalp are unremarkable. The calvarium is intact.    IMPRESSION:   Unchanged acute subdural hemorrhage along the falx and left territorial   leaflet.      < end of copied text >      Assessment:  Patient admitted with headache and confusion , found to have sdh. No infection is apparent.  Plan:  monitor off antibiotics  f/u cultures  defer to neurosurgery if any indication for further head imaging

## 2019-10-07 NOTE — CHART NOTE - NSCHARTNOTEFT_GEN_A_CORE
RN notified PA patient's sodium was 120  Pt is asymptomatic, with no complaints of headaches, nausea, malaise, numbness or tingling of extremities    Vital Signs Last 24 Hrs  T(C): 36.9 (07 Oct 2019 11:35), Max: 36.9 (07 Oct 2019 11:35)  T(F): 98.5 (07 Oct 2019 11:35), Max: 98.5 (07 Oct 2019 11:35)  HR: 68 (07 Oct 2019 11:38) (68 - 77)  BP: 165/66 (07 Oct 2019 11:38) (154/80 - 187/84)  BP(mean): --  RR: 18 (07 Oct 2019 11:35) (17 - 18)  SpO2: 95% (07 Oct 2019 11:35) (95% - 97%)      A/P-  #Hyponatremia- 120  - changed PO lasix 20mg to IV lasix 40mg BID- will give first dose STAT  - c/w U-ronald 15mg BID  - c/w fluid restriction 800mL/day    - F/u uric acid serum, serum osmoles, and urine osmoles  - C/w monitoring Na q8h  - C/w renal f/u    D/w Dr. Valles whom agrees with A/P  Notified Dr. Tomás Yee PA  Dept of Medicine  [id number]- [id number]

## 2019-10-07 NOTE — PHYSICAL THERAPY INITIAL EVALUATION ADULT - PERTINENT HX OF CURRENT PROBLEM, REHAB EVAL
83F PMHx DM, HTN, HLD, hypothyroidism presented to [place] ED after episode of AMS in am, found to have ’s, put on cardene and transferred to [facility]. Patient is currently normotensive, not on any drips. CT head shows acute falcine and L tentorial SDH without shift.

## 2019-10-07 NOTE — PROGRESS NOTE ADULT - SUBJECTIVE AND OBJECTIVE BOX
CARDIOLOGY     PROGRESS  NOTE   ________________________________________________    CHIEF COMPLAINT:Patient is a 83y old  Female who presents with a chief complaint of confussion (07 Oct 2019 08:30)  doing better  	  REVIEW OF SYSTEMS:  CONSTITUTIONAL: No fever, weight loss, or fatigue  EYES: No eye pain, visual disturbances, or discharge  ENT:  No difficulty hearing, tinnitus, vertigo; No sinus or throat pain  NECK: No pain or stiffness  RESPIRATORY: No cough, wheezing, chills or hemoptysis; No Shortness of Breath  CARDIOVASCULAR: No chest pain, palpitations, passing out, dizziness, or leg swelling  GASTROINTESTINAL: No abdominal or epigastric pain. No nausea, vomiting, or hematemesis; No diarrhea or constipation. No melena or hematochezia.  GENITOURINARY: No dysuria, frequency, hematuria, or incontinence  NEUROLOGICAL: No headaches, memory loss, loss of strength, numbness, or tremors  SKIN: No itching, burning, rashes, or lesions   LYMPH Nodes: No enlarged glands  ENDOCRINE: No heat or cold intolerance; No hair loss  MUSCULOSKELETAL: No joint pain or swelling; No muscle, back, or extremity pain  PSYCHIATRIC: No depression, anxiety, mood swings, or difficulty sleeping  HEME/LYMPH: No easy bruising, or bleeding gums  ALLERGY AND IMMUNOLOGIC: No hives or eczema	    [ ] All others negative	  [ ] Unable to obtain    PHYSICAL EXAM:  T(C): 36.8 (10-07-19 @ 04:29), Max: 36.8 (10-07-19 @ 04:29)  HR: 69 (10-07-19 @ 04:29) (69 - 72)  BP: 154/80 (10-07-19 @ 04:29) (154/80 - 180/80)  RR: 17 (10-07-19 @ 04:29) (17 - 18)  SpO2: 95% (10-07-19 @ 04:29) (95% - 96%)  Wt(kg): --  I&O's Summary    06 Oct 2019 07:01  -  07 Oct 2019 07:00  --------------------------------------------------------  IN: 390 mL / OUT: 500 mL / NET: -110 mL        Appearance: Normal	  HEENT:   Normal oral mucosa, PERRL, EOMI	  Lymphatic: No lymphadenopathy  Cardiovascular: Normal S1 S2, No JVD, No murmurs, No edema  Respiratory: Lungs clear to auscultation	  Gastrointestinal:  Soft, Non-tender, + BS	  Skin: No rashes, No ecchymoses, No cyanosis	  Neurologic: Non-focal  Extremities: Normal range of motion, No clubbing, cyanosis or edema  Vascular: Peripheral pulses palpable 2+ bilaterally    MEDICATIONS  (STANDING):  dextrose 5%. 1000 milliLiter(s) (50 mL/Hr) IV Continuous <Continuous>  dextrose 50% Injectable 12.5 Gram(s) IV Push once  dextrose 50% Injectable 25 Gram(s) IV Push once  dextrose 50% Injectable 25 Gram(s) IV Push once  furosemide    Tablet 20 milliGRAM(s) Oral daily  insulin lispro (HumaLOG) corrective regimen sliding scale   SubCutaneous three times a day before meals  levothyroxine 112 MICROGram(s) Oral daily  lisinopril 20 milliGRAM(s) Oral daily  metoprolol succinate ER 75 milliGRAM(s) Oral daily  potassium chloride    Tablet ER 40 milliEquivalent(s) Oral every 4 hours  simvastatin 5 milliGRAM(s) Oral at bedtime  Ure-Na 15gram 1 Packet(s) 1 Packet(s) Oral two times a day      TELEMETRY: 	    ECG:  	  RADIOLOGY:  OTHER: 	  	  LABS:	 	    CARDIAC MARKERS:  CARDIAC MARKERS ( 05 Oct 2019 11:22 )  .000 ng/mL / x     / x     / x     / x                                    11.5   12.87 )-----------( 263      ( 06 Oct 2019 09:10 )             36.5     10-07    125<L>  |  89<L>  |  13  ----------------------------<  197<H>  3.5   |  23  |  0.61    Ca    9.2      07 Oct 2019 05:58    TPro  8.1  /  Alb  3.7  /  TBili  0.4  /  DBili  x   /  AST  18  /  ALT  17  /  AlkPhos  68  10-05    proBNP:   Lipid Profile:   HgA1c: Hemoglobin A1C, Whole Blood: 7.4 % (10-06 @ 09:10)    TSH:   PT/INR - ( 05 Oct 2019 12:45 )   PT: 11.7 sec;   INR: 1.07 ratio         PTT - ( 05 Oct 2019 12:45 )  PTT:31.0 sec      Assessment and plan  ---------------------------   83  F presents w/ confusion, from [facility] transferred on [place] drip. Pt is not on thinners, atraumatic head bleed Family brought pt in not answering questions normally, Pt has a pmh of  DM, HTN, HLD  on baby aspirin last dose was yesterday in the evening  upon arrival to [place], pt was hypertensive w/ SBP 220s, called NSG at [facility] who recommended [place]  uncontrolled htn  continue beta blocker and ace if not controlled will consider iv [place]  repeat head ct scan  neuro check  tele  echo  lipid panel  hyponatremia as per renal  keep sbp about 140 and will gradually will bring it down to 120

## 2019-10-07 NOTE — PROGRESS NOTE ADULT - ASSESSMENT
83 F presents w/ confusion transferred  from [facility] ,  on cardene drip.    Pt is not on thinners, atraumatic head bleed/  SDH  Family brought pt in, as  pt was  not answering questions normally/  s/p  brief  confusion  , Pt has a pmh of  DM 2, , HTN, HLD   on baby aspirin last dose was day prior to  admission  At   [place], pt was hypertensive w/ SBP 220s,  therfore  pt  wa s tranferred  to  NSG at [facility] who recommended cardene,/     drip wa s stopped	  in er,  alert/  talking well/ no  cp/sob/ headaches     ct head  noted/  rpt ct head  , no  change    CT  with  SDH,   left  tentorium  and  falx   no intervention, per  neurosurgery   hold  asa   HTN,  meds  adjusted    DM 2, follow fs  mechanical  dvt ppx  PT  eval/       hyponatremia/  pt is euvolemic. on  urena 15  gm, bid/ seen by renal   sodium is  125  today/   fluid  restiction  bmp  in am     < from: CT Head No Cont (10.05.19 @ 16:41) >  IMPRESSION:   Unchanged acute subdural hemorrhage along the falx and left territorial   leaflet.  < end of copied text > 83 F presents w/ confusion transferred  from [facility] ,  on cardene drip.    Pt is not on thinners, atraumatic head bleed/  SDH  Family brought pt in, as  pt was  not answering questions normally/  s/p  brief  confusion  , Pt has a pmh of  DM 2, , HTN, HLD   on baby aspirin last dose was day prior to  admission  At   [place], pt was hypertensive w/ SBP 220s,  therfore  pt  wa s tranferred  to  NSG at [facility] who recommended cardene,/     drip wa s stopped	  in er,  alert/  talking well/ no  cp/sob/ headaches     ct head  noted/  rpt ct head  , no  change    CT  with  SDH,   left  tentorium  and  falx   no intervention, per  neurosurgery   hold  asa   HTN,  meds  adjusted    DM 2, follow fs  mechanical  dvt ppx  PT  eval/   blood  c/s, pending , per  ID      hyponatremia/  pt is euvolemic. on  urena 15  gm, bid/ seen by renal   sodium is  125  today/   fluid  restiction  bmp  in am     < from: CT Head No Cont (10.05.19 @ 16:41) >  IMPRESSION:   Unchanged acute subdural hemorrhage along the falx and left territorial   leaflet.  < end of copied text >

## 2019-10-07 NOTE — PHYSICAL THERAPY INITIAL EVALUATION ADULT - ADDITIONAL COMMENTS
Pt lives alone in co-op with 1 flight of stair to enter (+) stairlift, no stairs within. Pt owns a cane from her late .

## 2019-10-07 NOTE — PROGRESS NOTE ADULT - PROBLEM SELECTOR PLAN 1
Pt. with moderate hyponatremia in setting of subdural hematoma. Serum sodium was 137 on admission (10/5/2019), downtrended to 123 on 10/6/2019, repeat today is 125. No previous history of hyponatremia. Pt. with most likely acute hyponatremia.  TSH and Cortisol pending. Please send uric acid. Patient currently Ur-christie 15 mg BID. Please place on STRICT fluid restriction - 500ml/day. Can give another dose of Lasix IV and start BID Lasix for water diuresis and BP control.  Monitor serum sodium [id number]. Do not correct serum sodium >6-8 meq/day. Pt. with moderate hyponatremia in setting of subdural hematoma. Serum sodium was 137 on admission (10/5/2019), downtrended to 123 on 10/6/2019, repeat today is 125. No previous history of hyponatremia. Pt. with most likely acute hyponatremia.  TSH and Cortisol pending. Please send uric acid, serum osmoles and urine osmoles. Patient currently on Ur-christie 15 mg BID. Please place on STRICT fluid restriction - 500ml/day. Can give another dose of Lasix IV and start BID Lasix for water diuresis and BP control.  Monitor serum sodium [id number]. Do not correct serum sodium >6-8 meq/day.

## 2019-10-07 NOTE — PROGRESS NOTE ADULT - SUBJECTIVE AND OBJECTIVE BOX
[facility] DIVISION OF KIDNEY DISEASES AND HYPERTENSION -- FOLLOW UP NOTE  --------------------------------------------------------------------------------  Chief Complaint: SDH hematoma    24 hour events/subjective: Patient evaluated at bedside, in no acute distress. Denies any any new complaints. sNa remains 125 but patient denies fluid restricting.    PAST HISTORY  --------------------------------------------------------------------------------  No significant changes to PMH, PSH, FHx, SHx, unless otherwise noted    ALLERGIES & MEDICATIONS  --------------------------------------------------------------------------------  Allergies    penicillin (Muscle Pain)    Intolerances      Standing Inpatient Medications  dextrose 5%. 1000 milliLiter(s) IV Continuous <Continuous>  dextrose 50% Injectable 12.5 Gram(s) IV Push once  dextrose 50% Injectable 25 Gram(s) IV Push once  dextrose 50% Injectable 25 Gram(s) IV Push once  furosemide    Tablet 20 milliGRAM(s) Oral daily  insulin lispro (HumaLOG) corrective regimen sliding scale   SubCutaneous three times a day before meals  levothyroxine 112 MICROGram(s) Oral daily  lisinopril 20 milliGRAM(s) Oral daily  metoprolol succinate ER 75 milliGRAM(s) Oral daily  potassium chloride    Tablet ER 40 milliEquivalent(s) Oral every 4 hours  simvastatin 5 milliGRAM(s) Oral at bedtime  Ure-Na 15gram 1 Packet(s) 1 Packet(s) Oral two times a day    REVIEW OF SYSTEMS  --------------------------------------------------------------------------------  Gen: No fevers/chills  Skin: No rashes  Respiratory: No dyspnea, cough  CV: No chest pain  GI: (+) nausea  : No dysuria, hematuria  MSK: No  edema  Heme: No easy bruising or bleeding    All other systems were reviewed and are negative, except as noted.    VITALS/PHYSICAL EXAM  --------------------------------------------------------------------------------  T(C): 36.8 (10-07-19 @ 04:29), Max: 36.8 (10-07-19 @ 04:29)  HR: 69 (10-07-19 @ 04:29) (69 - 72)  BP: 154/80 (10-07-19 @ 04:29) (154/80 - 180/80)  RR: 17 (10-07-19 @ 04:29) (17 - 18)  SpO2: 95% (10-07-19 @ 04:29) (95% - 96%)  Wt(kg): --  Height (cm): 165.1 (10-06-19 @ 14:53)  Weight (kg): 79.2 (10-06-19 @ 14:53)  BMI (kg/m2): 29.1 (10-06-19 @ 14:53)  BSA (m2): 1.87 (10-06-19 @ 14:53)    10-06-19 @ 07:01  -  10-07-19 @ 07:00  --------------------------------------------------------  IN: 390 mL / OUT: 500 mL / NET: -110 mL    Physical Exam:  	Gen: NAD  	HEENT: MMM  	Pulm: CTA B/L  	CV: S1S2  	Abd: Soft, +BS   	Ext: trace LE edema B/L  	Neuro: Awake  	Skin: Warm and dry  	  LABS/STUDIES  --------------------------------------------------------------------------------              11.5   12.87 >-----------<  263      [10-06-19 @ 09:10]              36.5     125  |  89  |  13  ----------------------------<  197      [10-07-19 @ 05:58]  3.5   |  23  |  0.61        Ca     9.2     [10-07-19 @ 05:58]    TPro  8.1  /  Alb  3.7  /  TBili  0.4  /  DBili  x   /  AST  18  /  ALT  17  /  AlkPhos  68  [10-05-19 @ 11:22]    PT/INR: PT 11.7 , INR 1.07       [10-05-19 @ 12:45]  PTT: 31.0       [10-05-19 @ 12:45]    Troponin .000      [10-05-19 @ 11:22]    Creatinine Trend:  SCr 0.61 [10-07 @ 05:58]  SCr 0.59 [10-07 @ 02:15]  SCr 0.60 [10-06 @ 21:18]  SCr 0.60 [10-06 @ 10:48]  SCr 0.67 [10-06 @ 05:24]      Urine Creatinine 39      [10-07-19 @ 04:50]  Urine Protein 32      [10-07-19 @ 04:50]  Urine Sodium 89      [10-07-19 @ 04:50]  Urine Potassium 35      [10-07-19 @ 04:50]  Urine Chloride 89      [10-07-19 @ 04:50]

## 2019-10-07 NOTE — PHYSICAL THERAPY INITIAL EVALUATION ADULT - GENERAL OBSERVATIONS, REHAB EVAL
Pt received ambulating without assist in room, NAD. Pt alert, confused at times, agreeable to PT eval.

## 2019-10-07 NOTE — OCCUPATIONAL THERAPY INITIAL EVALUATION ADULT - LIVES WITH, PROFILE
Pt lives in senior citizen community, alone in apartment, 0 steps to enter, stairs in apartment, walk in shower with chair. Pt I in ADLs and ambulation prior to admission

## 2019-10-07 NOTE — OCCUPATIONAL THERAPY INITIAL EVALUATION ADULT - PERSONAL SAFETY AND JUDGMENT, REHAB EVAL
pt with difficulty stating months backwards, able to self correct and requires increased time/impaired

## 2019-10-07 NOTE — OCCUPATIONAL THERAPY INITIAL EVALUATION ADULT - PERTINENT HX OF CURRENT PROBLEM, REHAB EVAL
83 F presents w/ confusion, from [facility] transferred on cardene drip.Pt is not on thinners, atraumatic head bleed. Family brought pt in, as  pt was  not answering questions normally/  s/p  brief  confusion

## 2019-10-08 LAB
ANION GAP SERPL CALC-SCNC: 12 MMOL/L — SIGNIFICANT CHANGE UP (ref 5–17)
ANION GAP SERPL CALC-SCNC: 13 MMOL/L — SIGNIFICANT CHANGE UP (ref 5–17)
ANION GAP SERPL CALC-SCNC: 14 MMOL/L — SIGNIFICANT CHANGE UP (ref 5–17)
ANION GAP SERPL CALC-SCNC: 16 MMOL/L — SIGNIFICANT CHANGE UP (ref 5–17)
BUN SERPL-MCNC: 30 MG/DL — HIGH (ref 7–23)
BUN SERPL-MCNC: 32 MG/DL — HIGH (ref 7–23)
BUN SERPL-MCNC: 44 MG/DL — HIGH (ref 7–23)
BUN SERPL-MCNC: 46 MG/DL — HIGH (ref 7–23)
CALCIUM SERPL-MCNC: 10 MG/DL — SIGNIFICANT CHANGE UP (ref 8.4–10.5)
CALCIUM SERPL-MCNC: 10.2 MG/DL — SIGNIFICANT CHANGE UP (ref 8.4–10.5)
CALCIUM SERPL-MCNC: 9.4 MG/DL — SIGNIFICANT CHANGE UP (ref 8.4–10.5)
CALCIUM SERPL-MCNC: 9.8 MG/DL — SIGNIFICANT CHANGE UP (ref 8.4–10.5)
CHLORIDE SERPL-SCNC: 92 MMOL/L — LOW (ref 96–108)
CO2 SERPL-SCNC: 21 MMOL/L — LOW (ref 22–31)
CO2 SERPL-SCNC: 22 MMOL/L — SIGNIFICANT CHANGE UP (ref 22–31)
CO2 SERPL-SCNC: 22 MMOL/L — SIGNIFICANT CHANGE UP (ref 22–31)
CO2 SERPL-SCNC: 23 MMOL/L — SIGNIFICANT CHANGE UP (ref 22–31)
CREAT SERPL-MCNC: 0.73 MG/DL — SIGNIFICANT CHANGE UP (ref 0.5–1.3)
CREAT SERPL-MCNC: 0.74 MG/DL — SIGNIFICANT CHANGE UP (ref 0.5–1.3)
CREAT SERPL-MCNC: 0.8 MG/DL — SIGNIFICANT CHANGE UP (ref 0.5–1.3)
CREAT SERPL-MCNC: 0.84 MG/DL — SIGNIFICANT CHANGE UP (ref 0.5–1.3)
GLUCOSE BLDC GLUCOMTR-MCNC: 140 MG/DL — HIGH (ref 70–99)
GLUCOSE BLDC GLUCOMTR-MCNC: 191 MG/DL — HIGH (ref 70–99)
GLUCOSE BLDC GLUCOMTR-MCNC: 204 MG/DL — HIGH (ref 70–99)
GLUCOSE BLDC GLUCOMTR-MCNC: 240 MG/DL — HIGH (ref 70–99)
GLUCOSE SERPL-MCNC: 175 MG/DL — HIGH (ref 70–99)
GLUCOSE SERPL-MCNC: 186 MG/DL — HIGH (ref 70–99)
GLUCOSE SERPL-MCNC: 200 MG/DL — HIGH (ref 70–99)
GLUCOSE SERPL-MCNC: 209 MG/DL — HIGH (ref 70–99)
HCT VFR BLD CALC: 37 % — SIGNIFICANT CHANGE UP (ref 34.5–45)
HGB BLD-MCNC: 12.2 G/DL — SIGNIFICANT CHANGE UP (ref 11.5–15.5)
MAGNESIUM SERPL-MCNC: 1.9 MG/DL — SIGNIFICANT CHANGE UP (ref 1.6–2.6)
MCHC RBC-ENTMCNC: 27.5 PG — SIGNIFICANT CHANGE UP (ref 27–34)
MCHC RBC-ENTMCNC: 33 GM/DL — SIGNIFICANT CHANGE UP (ref 32–36)
MCV RBC AUTO: 83.3 FL — SIGNIFICANT CHANGE UP (ref 80–100)
PLATELET # BLD AUTO: 304 K/UL — SIGNIFICANT CHANGE UP (ref 150–400)
POTASSIUM SERPL-MCNC: 3.3 MMOL/L — LOW (ref 3.5–5.3)
POTASSIUM SERPL-MCNC: 3.4 MMOL/L — LOW (ref 3.5–5.3)
POTASSIUM SERPL-MCNC: 4 MMOL/L — SIGNIFICANT CHANGE UP (ref 3.5–5.3)
POTASSIUM SERPL-MCNC: 4.2 MMOL/L — SIGNIFICANT CHANGE UP (ref 3.5–5.3)
POTASSIUM SERPL-SCNC: 3.3 MMOL/L — LOW (ref 3.5–5.3)
POTASSIUM SERPL-SCNC: 3.4 MMOL/L — LOW (ref 3.5–5.3)
POTASSIUM SERPL-SCNC: 4 MMOL/L — SIGNIFICANT CHANGE UP (ref 3.5–5.3)
POTASSIUM SERPL-SCNC: 4.2 MMOL/L — SIGNIFICANT CHANGE UP (ref 3.5–5.3)
RBC # BLD: 4.44 M/UL — SIGNIFICANT CHANGE UP (ref 3.8–5.2)
RBC # FLD: 13.7 % — SIGNIFICANT CHANGE UP (ref 10.3–14.5)
SODIUM SERPL-SCNC: 126 MMOL/L — LOW (ref 135–145)
SODIUM SERPL-SCNC: 128 MMOL/L — LOW (ref 135–145)
SODIUM SERPL-SCNC: 128 MMOL/L — LOW (ref 135–145)
SODIUM SERPL-SCNC: 129 MMOL/L — LOW (ref 135–145)
WBC # BLD: 10.19 K/UL — SIGNIFICANT CHANGE UP (ref 3.8–10.5)
WBC # FLD AUTO: 10.19 K/UL — SIGNIFICANT CHANGE UP (ref 3.8–10.5)

## 2019-10-08 PROCEDURE — 99232 SBSQ HOSP IP/OBS MODERATE 35: CPT | Mod: GC

## 2019-10-08 RX ORDER — LISINOPRIL 2.5 MG/1
40 TABLET ORAL DAILY
Refills: 0 | Status: DISCONTINUED | OUTPATIENT
Start: 2019-10-08 | End: 2019-10-09

## 2019-10-08 RX ORDER — POTASSIUM CHLORIDE 20 MEQ
40 PACKET (EA) ORAL ONCE
Refills: 0 | Status: COMPLETED | OUTPATIENT
Start: 2019-10-08 | End: 2019-10-08

## 2019-10-08 RX ORDER — FUROSEMIDE 40 MG
20 TABLET ORAL DAILY
Refills: 0 | Status: DISCONTINUED | OUTPATIENT
Start: 2019-10-08 | End: 2019-10-09

## 2019-10-08 RX ADMIN — ONDANSETRON 4 MILLIGRAM(S): 8 TABLET, FILM COATED ORAL at 08:36

## 2019-10-08 RX ADMIN — Medication 2: at 08:32

## 2019-10-08 RX ADMIN — SIMVASTATIN 5 MILLIGRAM(S): 20 TABLET, FILM COATED ORAL at 22:15

## 2019-10-08 RX ADMIN — Medication 40 MILLIEQUIVALENT(S): at 11:43

## 2019-10-08 RX ADMIN — Medication 75 MILLIGRAM(S): at 05:54

## 2019-10-08 RX ADMIN — Medication 40 MILLIGRAM(S): at 05:54

## 2019-10-08 RX ADMIN — Medication 2: at 12:45

## 2019-10-08 RX ADMIN — LISINOPRIL 20 MILLIGRAM(S): 2.5 TABLET ORAL at 05:54

## 2019-10-08 RX ADMIN — Medication 112 MICROGRAM(S): at 05:54

## 2019-10-08 RX ADMIN — SODIUM CHLORIDE 1 GRAM(S): 9 INJECTION INTRAMUSCULAR; INTRAVENOUS; SUBCUTANEOUS at 05:54

## 2019-10-08 NOTE — PROGRESS NOTE ADULT - SUBJECTIVE AND OBJECTIVE BOX
CARDIOLOGY     PROGRESS  NOTE   ________________________________________________    CHIEF COMPLAINT:Patient is a 83y old  Female who presents with a chief complaint of confussion (07 Oct 2019 13:46)  no complain.  	  REVIEW OF SYSTEMS:  CONSTITUTIONAL: No fever, weight loss, or fatigue  EYES: No eye pain, visual disturbances, or discharge  ENT:  No difficulty hearing, tinnitus, vertigo; No sinus or throat pain  NECK: No pain or stiffness  RESPIRATORY: No cough, wheezing, chills or hemoptysis; No Shortness of Breath  CARDIOVASCULAR: No chest pain, palpitations, passing out, dizziness, or leg swelling  GASTROINTESTINAL: No abdominal or epigastric pain. No nausea, vomiting, or hematemesis; No diarrhea or constipation. No melena or hematochezia.  GENITOURINARY: No dysuria, frequency, hematuria, or incontinence  NEUROLOGICAL: No headaches, memory loss, loss of strength, numbness, or tremors  SKIN: No itching, burning, rashes, or lesions   LYMPH Nodes: No enlarged glands  ENDOCRINE: No heat or cold intolerance; No hair loss  MUSCULOSKELETAL: No joint pain or swelling; No muscle, back, or extremity pain  PSYCHIATRIC: No depression, anxiety, mood swings, or difficulty sleeping  HEME/LYMPH: No easy bruising, or bleeding gums  ALLERGY AND IMMUNOLOGIC: No hives or eczema	    [ ] All others negative	  [ ] Unable to obtain    PHYSICAL EXAM:  T(C): 36.8 (10-08-19 @ 04:04), Max: 36.9 (10-07-19 @ 11:35)  HR: 70 (10-08-19 @ 04:04) (68 - 78)  BP: 175/82 (10-08-19 @ 04:04) (159/84 - 187/84)  RR: 18 (10-08-19 @ 04:04) (18 - 18)  SpO2: 97% (10-08-19 @ 04:04) (95% - 97%)  Wt(kg): --  I&O's Summary    07 Oct 2019 07:01  -  08 Oct 2019 07:00  --------------------------------------------------------  IN: 800 mL / OUT: 400 mL / NET: 400 mL        Appearance: Normal	  HEENT:   Normal oral mucosa, PERRL, EOMI	  Lymphatic: No lymphadenopathy  Cardiovascular: Normal S1 S2, No JVD, No murmurs, No edema  Respiratory: Lungs clear to auscultation	  Psychiatry: A & O x 3, Mood & affect appropriate  Gastrointestinal:  Soft, Non-tender, + BS	  Skin: No rashes, No ecchymoses, No cyanosis	  Neurologic: Non-focal  Extremities: Normal range of motion, No clubbing, cyanosis or edema  Vascular: Peripheral pulses palpable 2+ bilaterally    MEDICATIONS  (STANDING):  dextrose 5%. 1000 milliLiter(s) (50 mL/Hr) IV Continuous <Continuous>  dextrose 50% Injectable 12.5 Gram(s) IV Push once  dextrose 50% Injectable 25 Gram(s) IV Push once  dextrose 50% Injectable 25 Gram(s) IV Push once  furosemide   Injectable 40 milliGRAM(s) IV Push two times a day  insulin lispro (HumaLOG) corrective regimen sliding scale   SubCutaneous three times a day before meals  levothyroxine 112 MICROGram(s) Oral daily  lisinopril 20 milliGRAM(s) Oral daily  metoprolol succinate ER 75 milliGRAM(s) Oral daily  simvastatin 5 milliGRAM(s) Oral at bedtime  sodium chloride 1 Gram(s) Oral two times a day  Ure-Na 15gram 1 Packet(s) 1 Packet(s) Oral two times a day      TELEMETRY: 	    ECG:  	  RADIOLOGY:  OTHER: 	  	  LABS:	 	    CARDIAC MARKERS:                                12.8   11.00 )-----------( 290      ( 07 Oct 2019 08:42 )             39.8     10-08    129<L>  |  92<L>  |  30<H>  ----------------------------<  200<H>  3.4<L>   |  21<L>  |  0.73    Ca    9.8      08 Oct 2019 05:57      proBNP:   Lipid Profile:   HgA1c: Hemoglobin A1C, Whole Blood: 7.4 % (10-06 @ 09:10)    TSH: Thyroid Stimulating Hormone, Serum: 2.05 uIU/mL (10-07 @ 09:42)          Assessment and plan  ---------------------------   83  F presents w/ confusion, from [facility] transferred on [place] drip. Pt is not on thinners, atraumatic head bleed Family brought pt in not answering questions normally, Pt has a pmh of  DM, HTN, HLD  on baby aspirin last dose was yesterday in the evening  upon arrival to [place], pt was hypertensive w/ SBP 220s, called NSG at [facility] who recommended [place]  uncontrolled htn  continue beta blocker and ace if not controlled will consider iv [place]  repeat head ct scan  neuro check  tele  echo  lipid panel  hyponatremia as per renal  keep sbp about 140 and will gradually will bring it down to 120  increase metoprolol er to 100 mg daily  awaiting blood test from today

## 2019-10-08 NOTE — DISCHARGE NOTE PROVIDER - CARE PROVIDER_API CALL
Sindhu Molina)  Internal Medicine; Nephrology  [address] [place] [address]  Phone: [phone]  Fax: [phone]  Follow Up Time:     Guilherme Calabrese)  Internal Medicine  [address] [place] [address]  Phone: [phone]  Fax: [phone]  Follow Up Time: Sindhu Molina)  Internal Medicine; Nephrology  [address] [place] [address]  Phone: [phone]  Fax: [phone]  Follow Up Time:     Guilherme Calabrese)  Internal Medicine  [address] [place] [address]  Phone: [phone]  Fax: [phone]  Follow Up Time:     Basia Morillo ()  Neurological Surgery  [address] [place] [address], [address]  [place] [address]  Phone: [phone]  Fax: [phone]  Follow Up Time:

## 2019-10-08 NOTE — PROGRESS NOTE ADULT - ASSESSMENT
83 F presents w/ confusion transferred  from [facility] ,  on cardene drip.    Pt is not on thinners, atraumatic head bleed/  SDH  Family brought pt in, as  pt was  not answering questions normally/  s/p  brief  confusion  , Pt has a pmh of  DM 2, , HTN, HLD   on baby aspirin last dose was day prior to  admission  At   [place], pt was hypertensive w/ SBP 220s,  therfore  pt  wa s tranferred  to  NSG at [facility] who recommended cardene,/     drip wa s stopped	  in er,  alert/  talking well/ no  cp/sob/ headaches     ct head  noted/  rpt ct head  , no  change    CT  with  SDH,   left  tentorium  and  falx   no intervention, per  neurosurgery   hold  asa   HTN,  meds  adjusted/ increased  lisinopril    DM 2, follow fs  mechanical  dvt ppx  PT  eval/ recommend s rehab. pt adamanyly  refusing  blood  c/s, negative      hyponatremia/  pt is euvolemic. on  urena 15  gm, bid/ seen by renal   sodium is  129  today/   fluid  restiction  on lasix  20 qd  bmp  in am  and -plan, d/c in am     < from: CT Head No Cont (10.05.19 @ 16:41) >  IMPRESSION:   Unchanged acute subdural hemorrhage along the falx and left territorial   leaflet.  < end of copied text >

## 2019-10-08 NOTE — DISCHARGE NOTE PROVIDER - NSDCCPCAREPLAN_GEN_ALL_CORE_FT
PRINCIPAL DISCHARGE DIAGNOSIS  Diagnosis: Subdural bleeding  Assessment and Plan of Treatment:       SECONDARY DISCHARGE DIAGNOSES  Diagnosis: Acute hyponatremia  Assessment and Plan of Treatment: Acute hyponatremia    Diagnosis: Hypertension  Assessment and Plan of Treatment:

## 2019-10-08 NOTE — DISCHARGE NOTE PROVIDER - NSDCFUADDINST_GEN_ALL_CORE_FT
Make appointments to follow up with your physician(s)  Bring all discharge paperwork including discharge medication list to follow up appointments - Have follow up blood work done - specifically to check sodium level - Continue to take the Ure-Na 2 packets daily (as prescribed) until you get your sodium blood level checked and the doctor takes you off of it. Your SUBCUTANEOUS INSULIN DOSE HAS BEEN REDUCED - DO NOT GIVE YOURSELF YOUR DAILY DOSE IF YOUR FINGERSTICK GLUCOSE IS LESS THEN 120  Make appointments to follow up with your physician(s)  Bring all discharge paperwork including discharge medication list to follow up appointments - Have follow up blood work done - specifically to check sodium level - Continue to take the Ure-Na 2 packets daily (as prescribed) until you get your sodium blood level checked and the doctor takes you off of it.  Follow up with the neurosurgeon 1 - 2 weeks after discharge

## 2019-10-08 NOTE — PROGRESS NOTE ADULT - SUBJECTIVE AND OBJECTIVE BOX
no  cp/sob    REVIEW OF SYSTEMS:  GEN: no fever,    no chills  RESP: no SOB,   no cough  CVS: no chest pain,   no palpitations  GI: no abdominal pain,   no nausea,   no vomiting,   no constipation,   no diarrhea  : no dysuria,   no frequency  NEURO: no headache,   no dizziness  PSYCH: no depression,   not anxious  Derm : no rash    MEDICATIONS  (STANDING):  dextrose 5%. 1000 milliLiter(s) (50 mL/Hr) IV Continuous <Continuous>  dextrose 50% Injectable 12.5 Gram(s) IV Push once  dextrose 50% Injectable 25 Gram(s) IV Push once  dextrose 50% Injectable 25 Gram(s) IV Push once  furosemide    Tablet 20 milliGRAM(s) Oral daily  insulin lispro (HumaLOG) corrective regimen sliding scale   SubCutaneous three times a day before meals  levothyroxine 112 MICROGram(s) Oral daily  lisinopril 40 milliGRAM(s) Oral daily  metoprolol succinate ER 75 milliGRAM(s) Oral daily  potassium chloride    Tablet ER 40 milliEquivalent(s) Oral once  simvastatin 5 milliGRAM(s) Oral at bedtime  Ure-Na 15gram 1 Packet(s) 1 Packet(s) Oral two times a day    MEDICATIONS  (PRN):  dextrose 40% Gel 15 Gram(s) Oral once PRN Blood Glucose LESS THAN 70 milliGRAM(s)/deciliter  glucagon  Injectable 1 milliGRAM(s) IntraMuscular once PRN Glucose LESS THAN 70 milligrams/deciliter      Vital Signs Last 24 Hrs  T(C): 36.8 (08 Oct 2019 04:04), Max: 36.9 (07 Oct 2019 11:35)  T(F): 98.2 (08 Oct 2019 04:04), Max: 98.5 (07 Oct 2019 11:35)  HR: 70 (08 Oct 2019 04:04) (68 - 78)  BP: 175/82 (08 Oct 2019 04:04) (159/84 - 175/82)  BP(mean): --  RR: 18 (08 Oct 2019 04:04) (18 - 18)  SpO2: 97% (08 Oct 2019 04:04) (95% - 97%)  CAPILLARY BLOOD GLUCOSE      POCT Blood Glucose.: 204 mg/dL (08 Oct 2019 08:04)  POCT Blood Glucose.: 192 mg/dL (07 Oct 2019 21:36)  POCT Blood Glucose.: 206 mg/dL (07 Oct 2019 16:52)  POCT Blood Glucose.: 278 mg/dL (07 Oct 2019 12:12)    I&O's Summary    07 Oct 2019 07:01  -  08 Oct 2019 07:00  --------------------------------------------------------  IN: 800 mL / OUT: 400 mL / NET: 400 mL        PHYSICAL EXAM:  HEAD:  Atraumatic, Normocephalic  NECK: Supple, No   JVD  CHEST/LUNG:   no     rales,     no,    rhonchi  HEART: Regular rate and rhythm;         murmur  ABDOMEN: Soft, Nontender, ;   EXTREMITIES:     no   edema  NEUROLOGY:  alert    LABS:                        12.2   10.19 )-----------( 304      ( 08 Oct 2019 08:19 )             37.0     10-08    129<L>  |  92<L>  |  30<H>  ----------------------------<  200<H>  3.4<L>   |  21<L>  |  0.73    Ca    9.8      08 Oct 2019 05:57                    Hemoglobin A1C, Whole Blood: 7.4 % (10-06 @ 09:10)    Thyroid Stimulating Hormone, Serum: 2.05 uIU/mL (10-07 @ 09:42)          Consultant(s) Notes Reviewed:      Care Discussed with Consultants/Other Providers:

## 2019-10-08 NOTE — PROGRESS NOTE ADULT - ASSESSMENT
84 yo female with headache and SDH  Leukocytosis is reactive and resolved  No signs of infection and blood cultures are negative  With no additional ID w/u planned we will stop actively following, please call if ID issues arise

## 2019-10-08 NOTE — PROGRESS NOTE ADULT - SUBJECTIVE AND OBJECTIVE BOX
CC: f/u for leukocytosis and SDH    Patient reports: she is alert, no complaints    REVIEW OF SYSTEMS:  All other review of systems negative (Comprehensive ROS)    Antimicrobials Day #  :off    Other Medications Reviewed    T(F): 98.2 (10-08-19 @ 11:33), Max: 98.2 (10-08-19 @ 04:04)  HR: 73 (10-08-19 @ 11:33)  BP: 111/67 (10-08-19 @ 11:33)  RR: 18 (10-08-19 @ 11:33)  SpO2: 93% (10-08-19 @ 11:33)  Wt(kg): --    PHYSICAL EXAM:  General: alert, no acute distress  Eyes:  anicteric, no conjunctival injection, no discharge  Oropharynx: no lesions or injection 	  Neck: supple, without adenopathy  Lungs: clear to auscultation  Heart: regular rate and rhythm; no murmur, rubs or gallops  Abdomen: soft, nondistended, nontender, without mass or organomegaly  Skin: no lesions  Extremities: no clubbing, cyanosis, or edema  Neurologic: alert, oriented, moves all extremities    LAB RESULTS:                        12.2   10.19 )-----------( 304      ( 08 Oct 2019 08:19 )             37.0     10-08    128<L>  |  92<L>  |  44<H>  ----------------------------<  175<H>  4.0   |  23  |  0.84    Ca    10.2      08 Oct 2019 13:51  Mg     1.9     10-08          MICROBIOLOGY:  RECENT CULTURES:  10-06 @ 23:07 .Blood     No growth to date.          RADIOLOGY REVIEWED:    < from: CT Head No Cont (10.05.19 @ 16:41) >  IMPRESSION:   Unchanged acute subdural hemorrhage along the falx and left territorial   leaflet

## 2019-10-08 NOTE — PROGRESS NOTE ADULT - SUBJECTIVE AND OBJECTIVE BOX
no cp.sob    REVIEW OF SYSTEMS:  GEN: no fever,    no chills  RESP: no SOB,   no cough  CVS: no chest pain,   no palpitations  GI: no abdominal pain,   no nausea,   no vomiting,   no constipation,   no diarrhea  : no dysuria,   no frequency  NEURO: no headache,   no dizziness  PSYCH: no depression,   not anxious  Derm : no rash    MEDICATIONS  (STANDING):  dextrose 5%. 1000 milliLiter(s) (50 mL/Hr) IV Continuous <Continuous>  dextrose 50% Injectable 12.5 Gram(s) IV Push once  dextrose 50% Injectable 25 Gram(s) IV Push once  dextrose 50% Injectable 25 Gram(s) IV Push once  furosemide   Injectable 40 milliGRAM(s) IV Push two times a day  insulin lispro (HumaLOG) corrective regimen sliding scale   SubCutaneous three times a day before meals  levothyroxine 112 MICROGram(s) Oral daily  lisinopril 20 milliGRAM(s) Oral daily  metoprolol succinate ER 75 milliGRAM(s) Oral daily  potassium chloride    Tablet ER 40 milliEquivalent(s) Oral once  simvastatin 5 milliGRAM(s) Oral at bedtime  sodium chloride 1 Gram(s) Oral two times a day  Ure-Na 15gram 1 Packet(s) 1 Packet(s) Oral two times a day    MEDICATIONS  (PRN):  dextrose 40% Gel 15 Gram(s) Oral once PRN Blood Glucose LESS THAN 70 milliGRAM(s)/deciliter  glucagon  Injectable 1 milliGRAM(s) IntraMuscular once PRN Glucose LESS THAN 70 milligrams/deciliter  ondansetron Injectable 4 milliGRAM(s) IV Push every 8 hours PRN Nausea      Vital Signs Last 24 Hrs  T(C): 36.8 (08 Oct 2019 04:04), Max: 36.9 (07 Oct 2019 11:35)  T(F): 98.2 (08 Oct 2019 04:04), Max: 98.5 (07 Oct 2019 11:35)  HR: 70 (08 Oct 2019 04:04) (68 - 78)  BP: 175/82 (08 Oct 2019 04:04) (159/84 - 175/82)  BP(mean): --  RR: 18 (08 Oct 2019 04:04) (18 - 18)  SpO2: 97% (08 Oct 2019 04:04) (95% - 97%)  CAPILLARY BLOOD GLUCOSE      POCT Blood Glucose.: 204 mg/dL (08 Oct 2019 08:04)  POCT Blood Glucose.: 192 mg/dL (07 Oct 2019 21:36)  POCT Blood Glucose.: 206 mg/dL (07 Oct 2019 16:52)  POCT Blood Glucose.: 278 mg/dL (07 Oct 2019 12:12)    I&O's Summary    07 Oct 2019 07:01  -  08 Oct 2019 07:00  --------------------------------------------------------  IN: 800 mL / OUT: 400 mL / NET: 400 mL        PHYSICAL EXAM:  HEAD:  Atraumatic, Normocephalic  NECK: Supple, No   JVD  CHEST/LUNG:   no     rales,     no,    rhonchi  HEART: Regular rate and rhythm;         murmur  ABDOMEN: Soft, Nontender, ;   EXTREMITIES:    no    edema  NEUROLOGY:  alert    LABS:                        12.2   10.19 )-----------( 304      ( 08 Oct 2019 08:19 )             37.0     10-08    129<L>  |  92<L>  |  30<H>  ----------------------------<  200<H>  3.4<L>   |  21<L>  |  0.73    Ca    9.8      08 Oct 2019 05:57                    Hemoglobin A1C, Whole Blood: 7.4 % (10-06 @ 09:10)    Thyroid Stimulating Hormone, Serum: 2.05 uIU/mL (10-07 @ 09:42)          Consultant(s) Notes Reviewed:      Care Discussed with Consultants/Other Providers:

## 2019-10-08 NOTE — DISCHARGE NOTE PROVIDER - HOSPITAL COURSE
83F PMHx HTN, HLD, DM2, hypothyroidism transfer from [facility] atraumatic subdural hematoma in setting hypertension  (not on blood thinner) for possible neurosurgical intervention however upon arrival to [facility] patient return to baseline.  Neurosurgery consult, no acute intervention.      Nephrology consulted for hyponatremia, on admission serum sodium 139, downtrended to (125corrected), normal SCr.  - moderate hyponatremia in setting of subdural hematoma. SIADH.     Corrected, on Cobb    Seen by cardiology for blood pressure management - Goal     Patient refusing THEA - d/c home with home care/ home PT

## 2019-10-08 NOTE — PROGRESS NOTE ADULT - ASSESSMENT
83 F presents w/ confusion transferred  from [facility] ,  on cardene drip.    Pt is not on thinners, atraumatic head bleed/  SDH  Family brought pt in, as  pt was  not answering questions normally/  s/p  brief  confusion  , Pt has a pmh of  DM 2, , HTN, HLD   on baby aspirin last dose was day prior to  admission  At   [place], pt was hypertensive w/ SBP 220s,  therfore  pt  wa s tranferred  to  NSG at [facility] who recommended cardene,/     drip wa s stopped	  in er,  alert/  talking well/ no  cp/sob/ headaches     ct head  noted/  rpt ct head  , no  change    CT  with  SDH,   left  tentorium  and  falx   no intervention, per  neurosurgery   hold  asa   HTN,  meds  adjusted    DM 2, follow fs  mechanical  dvt ppx  PT  eval/ recommend s rehab. pt adamanyly  refusing  blood  c/s, negative      hyponatremia/  pt is euvolemic. on  urena 15  gm, bid/ seen by renal   sodium is  129  today/   fluid  restiction  bmp  in am  and -plan, d/c in am     < from: CT Head No Cont (10.05.19 @ 16:41) >  IMPRESSION:   Unchanged acute subdural hemorrhage along the falx and left territorial   leaflet.  < end of copied text > 83 F presents w/ confusion transferred  from [facility] ,  on cardene drip.    Pt is not on thinners, atraumatic head bleed/  SDH  Family brought pt in, as  pt was  not answering questions normally/  s/p  brief  confusion  , Pt has a pmh of  DM 2, , HTN, HLD   on baby aspirin last dose was day prior to  admission  At   [place], pt was hypertensive w/ SBP 220s,  therfore  pt  wa s tranferred  to  NSG at [facility] who recommended cardene,/     drip wa s stopped	  in er,  alert/  talking well/ no  cp/sob/ headaches     ct head  noted/  rpt ct head  , no  change    CT  with  SDH,   left  tentorium  and  falx   no intervention, per  neurosurgery   hold  asa   HTN,  meds  adjusted    DM 2, follow fs  mechanical  dvt ppx  PT  eval/ recommend s rehab. pt adamanyly  refusing  blood  c/s, negative      hyponatremia/  pt is euvolemic. on  urena 15  gm, bid/ seen by renal   sodium is  129  today/   fluid  restiction  bmp  in am  and -plan, d/c in am/   spoke  with daughter     < from: CT Head No Cont (10.05.19 @ 16:41) >  IMPRESSION:   Unchanged acute subdural hemorrhage along the falx and left territorial   leaflet.  < end of copied text >

## 2019-10-08 NOTE — DISCHARGE NOTE PROVIDER - PROVIDER TOKENS
PROVIDER:[TOKEN:[[id number]]],PROVIDER:[TOKEN:[[id number]]] PROVIDER:[TOKEN:[[id number]]],PROVIDER:[TOKEN:[[id number]]],PROVIDER:[TOKEN:[[id number]]]

## 2019-10-08 NOTE — PROGRESS NOTE ADULT - SUBJECTIVE AND OBJECTIVE BOX
[facility] DIVISION OF KIDNEY DISEASES AND HYPERTENSION -- FOLLOW UP NOTE  --------------------------------------------------------------------------------  Chief Complaint:/subjective: no acute events, sodium improving, no complaints    24 hour events:as above        PAST HISTORY  --------------------------------------------------------------------------------  No significant changes to PMH, PSH, FHx, SHx, unless otherwise noted    ALLERGIES & MEDICATIONS  --------------------------------------------------------------------------------  Allergies    penicillin (Muscle Pain)    Intolerances      Standing Inpatient Medications  dextrose 5%. 1000 milliLiter(s) IV Continuous <Continuous>  dextrose 50% Injectable 12.5 Gram(s) IV Push once  dextrose 50% Injectable 25 Gram(s) IV Push once  dextrose 50% Injectable 25 Gram(s) IV Push once  furosemide    Tablet 20 milliGRAM(s) Oral daily  insulin lispro (HumaLOG) corrective regimen sliding scale   SubCutaneous three times a day before meals  levothyroxine 112 MICROGram(s) Oral daily  lisinopril 40 milliGRAM(s) Oral daily  metoprolol succinate ER 75 milliGRAM(s) Oral daily  simvastatin 5 milliGRAM(s) Oral at bedtime  Ure-Na 15gram 1 Packet(s) 1 Packet(s) Oral two times a day    PRN Inpatient Medications  dextrose 40% Gel 15 Gram(s) Oral once PRN  glucagon  Injectable 1 milliGRAM(s) IntraMuscular once PRN      REVIEW OF SYSTEMS  --------------------------------------------------------------------------------  Gen: No weight changes, fatigue, fevers/chills, weakness  Skin: No rashes  Head/Eyes/Ears/Mouth: No headache;   Respiratory: No dyspnea, cough  CV: No chest pain, PND, orthopnea  GI: No abdominal pain, diarrhea, constipation, nausea, vomiting  : No increased frequency, dysuria, hematuria, nocturia  MSK: No joint pain/swelling; no back pain; no edema  Neuro: No dizziness/lightheadedness, weakness  Heme: No easy bruising or bleeding  Psych: No significant nervousness, anxiety, stress, depression    All other systems were reviewed and are negative, except as noted.    VITALS/PHYSICAL EXAM  --------------------------------------------------------------------------------  T(C): 36.8 (10-08-19 @ 11:33), Max: 36.8 (10-08-19 @ 04:04)  HR: 73 (10-08-19 @ 11:33) (70 - 78)  BP: 111/67 (10-08-19 @ 11:33) (111/67 - 175/82)  RR: 18 (10-08-19 @ 11:33) (18 - 18)  SpO2: 93% (10-08-19 @ 11:33) (93% - 97%)  Wt(kg): --  Adult Advanced Hemodynamics Last 24 Hrs  ABP: --  ABP(mean): --  CVP(mm Hg): --  CO: --  CI: --  PA: --  PA(mean): --  PCWP: --  SVR: --  SVRI: --  Height (cm): 165.1 (10-06-19 @ 14:53)  Weight (kg): 79.2 (10-06-19 @ 14:53)  BMI (kg/m2): 29.1 (10-06-19 @ 14:53)  BSA (m2): 1.87 (10-06-19 @ 14:53)      10-07-19 @ 07:01  -  10-08-19 @ 07:00  --------------------------------------------------------  IN: 800 mL / OUT: 400 mL / NET: 400 mL      Physical Exam:  	Gen: NAD,    	HEENT:   no jvp  	Pulm: CTA B/L  	CV: RRR, S1S2; no rub  	Back:  no sacral edema  	Abd: +BS, soft, nontender/nondistended  	: No suprapubic tenderness  	Ext: no edema  	Neuro: awake  	Psych: alert  	Skin: Warm,   	Vascular access:    LABS/STUDIES  --------------------------------------------------------------------------------              12.2   10.19 >-----------<  304      [10-08-19 @ 08:19]              37.0     Hemoglobin: 12.2 g/dL (10-08-19 @ 08:19)  Hemoglobin: 12.8 g/dL (10-07-19 @ 08:42)    Platelet Count - Automated: 304 K/uL (10-08-19 @ 08:19)  Platelet Count - Automated: 290 K/uL (10-07-19 @ 08:42)    129  |  92  |  30  ----------------------------<  200      [10-08-19 @ 05:57]  3.4   |  21  |  0.73        Ca     9.8     [10-08-19 @ 05:57]          Serum Osmolality 271      [10-07-19 @ 08:26]    Creatinine Trend:  SCr 0.73 [10-08 @ 05:57]  SCr 0.74 [10-08 @ 01:27]  SCr 0.64 [10-07 @ 15:18]  SCr 0.61 [10-07 @ 05:58]  SCr 0.59 [10-07 @ 02:15]      Urine Creatinine 39      [10-07-19 @ 04:50]  Urine Protein 32      [10-07-19 @ 04:50]  Urine Sodium 89      [10-07-19 @ 04:50]  Urine Potassium 35      [10-07-19 @ 04:50]  Urine Chloride 89      [10-07-19 @ 04:50]  Urine Osmolality 459      [10-07-19 @ 09:45]    HbA1c 7.4      [10-06-19 @ 09:10]  TSH 2.05      [10-07-19 @ 09:42]

## 2019-10-08 NOTE — DISCHARGE NOTE PROVIDER - CARE PROVIDERS DIRECT ADDRESSES
,[email or web address]

## 2019-10-08 NOTE — PROGRESS NOTE ADULT - ASSESSMENT
83F PMHx HTN, HLD, DM2, hypothyroidism transfer from [facility] atraumatic subdural hematoma in setting hypertension sBP 220 (not on blood thinner) for possible neurosurgical intervention however upon arrival to [facility] patient return to baseline.  Neurosurgery consult, no acute intervention.  Nephrology consulted for hyponatremia, on admission serum sodium 139, today downtrend 125 (corrected), normal SCr.

## 2019-10-09 VITALS
DIASTOLIC BLOOD PRESSURE: 74 MMHG | HEART RATE: 74 BPM | SYSTOLIC BLOOD PRESSURE: 150 MMHG | OXYGEN SATURATION: 94 % | TEMPERATURE: 98 F | RESPIRATION RATE: 17 BRPM

## 2019-10-09 LAB
ANION GAP SERPL CALC-SCNC: 14 MMOL/L — SIGNIFICANT CHANGE UP (ref 5–17)
BUN SERPL-MCNC: 40 MG/DL — HIGH (ref 7–23)
CALCIUM SERPL-MCNC: 9.5 MG/DL — SIGNIFICANT CHANGE UP (ref 8.4–10.5)
CHLORIDE SERPL-SCNC: 95 MMOL/L — LOW (ref 96–108)
CO2 SERPL-SCNC: 21 MMOL/L — LOW (ref 22–31)
CREAT SERPL-MCNC: 0.79 MG/DL — SIGNIFICANT CHANGE UP (ref 0.5–1.3)
GLUCOSE BLDC GLUCOMTR-MCNC: 213 MG/DL — HIGH (ref 70–99)
GLUCOSE BLDC GLUCOMTR-MCNC: 234 MG/DL — HIGH (ref 70–99)
GLUCOSE SERPL-MCNC: 181 MG/DL — HIGH (ref 70–99)
HCT VFR BLD CALC: 39.3 % — SIGNIFICANT CHANGE UP (ref 34.5–45)
HGB BLD-MCNC: 12.5 G/DL — SIGNIFICANT CHANGE UP (ref 11.5–15.5)
MCHC RBC-ENTMCNC: 27.7 PG — SIGNIFICANT CHANGE UP (ref 27–34)
MCHC RBC-ENTMCNC: 31.8 GM/DL — LOW (ref 32–36)
MCV RBC AUTO: 86.9 FL — SIGNIFICANT CHANGE UP (ref 80–100)
PLATELET # BLD AUTO: 331 K/UL — SIGNIFICANT CHANGE UP (ref 150–400)
POTASSIUM SERPL-MCNC: 4.1 MMOL/L — SIGNIFICANT CHANGE UP (ref 3.5–5.3)
POTASSIUM SERPL-SCNC: 4.1 MMOL/L — SIGNIFICANT CHANGE UP (ref 3.5–5.3)
RBC # BLD: 4.52 M/UL — SIGNIFICANT CHANGE UP (ref 3.8–5.2)
RBC # FLD: 13.7 % — SIGNIFICANT CHANGE UP (ref 10.3–14.5)
SODIUM SERPL-SCNC: 130 MMOL/L — LOW (ref 135–145)
WBC # BLD: 12.23 K/UL — HIGH (ref 3.8–10.5)
WBC # FLD AUTO: 12.23 K/UL — HIGH (ref 3.8–10.5)

## 2019-10-09 PROCEDURE — 99232 SBSQ HOSP IP/OBS MODERATE 35: CPT

## 2019-10-09 RX ORDER — SIMVASTATIN 20 MG/1
1 TABLET, FILM COATED ORAL
Qty: 0 | Refills: 0 | DISCHARGE
Start: 2019-10-09

## 2019-10-09 RX ORDER — PIOGLITAZONE HCL AND METFORMIN HCL 850; 15 MG/1; MG/1
1 TABLET ORAL
Qty: 90 | Refills: 0
Start: 2019-10-09 | End: 2019-11-07

## 2019-10-09 RX ORDER — INSULIN DETEMIR 100/ML (3)
20 INSULIN PEN (ML) SUBCUTANEOUS
Qty: 0 | Refills: 0 | DISCHARGE

## 2019-10-09 RX ORDER — METOPROLOL TARTRATE 50 MG
1 TABLET ORAL
Qty: 0 | Refills: 0 | DISCHARGE

## 2019-10-09 RX ORDER — UREA 15 G
1 POWDER IN PACKET (EA) ORAL
Qty: 40 | Refills: 0
Start: 2019-10-09 | End: 2019-10-28

## 2019-10-09 RX ORDER — METOPROLOL TARTRATE 50 MG
3 TABLET ORAL
Qty: 90 | Refills: 0
Start: 2019-10-09 | End: 2019-11-07

## 2019-10-09 RX ORDER — FUROSEMIDE 40 MG
1 TABLET ORAL
Qty: 0 | Refills: 0 | DISCHARGE

## 2019-10-09 RX ORDER — FUROSEMIDE 40 MG
1 TABLET ORAL
Qty: 30 | Refills: 0
Start: 2019-10-09 | End: 2019-11-07

## 2019-10-09 RX ORDER — PIOGLITAZONE HCL AND METFORMIN HCL 850; 15 MG/1; MG/1
1 TABLET ORAL
Qty: 0 | Refills: 0 | DISCHARGE

## 2019-10-09 RX ADMIN — Medication 2: at 08:20

## 2019-10-09 RX ADMIN — LISINOPRIL 40 MILLIGRAM(S): 2.5 TABLET ORAL at 05:10

## 2019-10-09 RX ADMIN — Medication 75 MILLIGRAM(S): at 05:10

## 2019-10-09 RX ADMIN — Medication 2: at 12:33

## 2019-10-09 RX ADMIN — Medication 20 MILLIGRAM(S): at 05:09

## 2019-10-09 RX ADMIN — Medication 112 MICROGRAM(S): at 05:09

## 2019-10-09 NOTE — DISCHARGE NOTE NURSING/CASE MANAGEMENT/SOCIAL WORK - PATIENT PORTAL LINK FT
You can access the FollowMyHealth Patient Portal offered by [facility] by registering at the following website: [email or web address]. By joining [facility]’s FollowMyHealth portal, you will also be able to view your health information using other applications (apps) compatible with our system.

## 2019-10-09 NOTE — PROGRESS NOTE ADULT - ASSESSMENT
82 yo female with headache and SDH  Leukocytosis is stable, appears reactive.  No signs of infection and blood cultures are negative  She has no clinical signs of infection and blood cultures have been negative  No ID objection to discharge.I have advised her to f/u with her PMD and have a f/u cbc with him.  "reactive" leukocytosis is a diagnosis of exclusion

## 2019-10-09 NOTE — PROGRESS NOTE ADULT - SUBJECTIVE AND OBJECTIVE BOX
CC: f/u for leukocytosis    Patient reports: no complaints, she is anxious for discharge.No fever or headache, rocky respiratory,GI or  symptoms    REVIEW OF SYSTEMS:  All other review of systems negative (Comprehensive ROS)    Antimicrobials Day #  :off    Other Medications Reviewed    T(F): 98.3 (10-09-19 @ 11:25), Max: 98.3 (10-08-19 @ 21:10)  HR: 74 (10-09-19 @ 11:25)  BP: 150/74 (10-09-19 @ 11:25)  RR: 17 (10-09-19 @ 11:25)  SpO2: 94% (10-09-19 @ 11:25)  Wt(kg): --    PHYSICAL EXAM:  General: alert, no acute distress  Eyes:  anicteric, no conjunctival injection, no discharge  Oropharynx: no lesions or injection 	  Neck: supple, without adenopathy  Lungs: clear to auscultation  Heart: regular rate and rhythm; no murmur, rubs or gallops  Abdomen: soft, nondistended, nontender, without mass or organomegaly  Skin: no lesions  Extremities: no clubbing, cyanosis, or edema  Neurologic: alert, oriented, moves all extremities    LAB RESULTS:                        12.5 12.23 )-----------( 331      ( 09 Oct 2019 08:16 )             39.3     10-09    130<L>  |  95<L>  |  40<H>  ----------------------------<  181<H>  4.1   |  21<L>  |  0.79    Ca    9.5      09 Oct 2019 05:47  Mg     1.9     10-08          MICROBIOLOGY:  RECENT CULTURES:  10-06 @ 23:07 .Blood     No growth to date.          RADIOLOGY REVIEWED:  < from: CT Head No Cont (10.05.19 @ 11:57) >  IMPRESSION:    Acute subdural hemorrhage layering along the left tentorium and falx   cerebri.    Dr. Castle discussed the findings with KIMMY De Leon on October 5, 2019 at 12:04   PM.  Readback was obtained.      < end of copied text >

## 2019-10-09 NOTE — PROGRESS NOTE ADULT - SUBJECTIVE AND OBJECTIVE BOX
REVIEW OF SYSTEMS:  GEN: no fever,    no chills  RESP: no SOB,   no cough  CVS: no chest pain,   no palpitations  GI: no abdominal pain,   no nausea,   no vomiting,   no constipation,   no diarrhea  : no dysuria,   no frequency  NEURO: no headache,   no dizziness  PSYCH: no depression,   not anxious  Derm : no rash    MEDICATIONS  (STANDING):  dextrose 5%. 1000 milliLiter(s) (50 mL/Hr) IV Continuous <Continuous>  dextrose 50% Injectable 12.5 Gram(s) IV Push once  dextrose 50% Injectable 25 Gram(s) IV Push once  dextrose 50% Injectable 25 Gram(s) IV Push once  furosemide    Tablet 20 milliGRAM(s) Oral daily  insulin lispro (HumaLOG) corrective regimen sliding scale   SubCutaneous three times a day before meals  levothyroxine 112 MICROGram(s) Oral daily  lisinopril 40 milliGRAM(s) Oral daily  metoprolol succinate ER 75 milliGRAM(s) Oral daily  simvastatin 5 milliGRAM(s) Oral at bedtime  Ure-Na 15gram 1 Packet(s) 1 Packet(s) Oral two times a day    MEDICATIONS  (PRN):  dextrose 40% Gel 15 Gram(s) Oral once PRN Blood Glucose LESS THAN 70 milliGRAM(s)/deciliter  glucagon  Injectable 1 milliGRAM(s) IntraMuscular once PRN Glucose LESS THAN 70 milligrams/deciliter      Vital Signs Last 24 Hrs  T(C): 36.8 (09 Oct 2019 11:25), Max: 36.8 (08 Oct 2019 21:10)  T(F): 98.3 (09 Oct 2019 11:25), Max: 98.3 (08 Oct 2019 21:10)  HR: 74 (09 Oct 2019 11:25) (70 - 76)  BP: 150/74 (09 Oct 2019 11:25) (118/70 - 161/76)  BP(mean): --  RR: 17 (09 Oct 2019 11:25) (16 - 18)  SpO2: 94% (09 Oct 2019 11:25) (94% - 99%)  CAPILLARY BLOOD GLUCOSE      POCT Blood Glucose.: 234 mg/dL (09 Oct 2019 12:04)  POCT Blood Glucose.: 213 mg/dL (09 Oct 2019 07:57)  POCT Blood Glucose.: 191 mg/dL (08 Oct 2019 21:42)  POCT Blood Glucose.: 140 mg/dL (08 Oct 2019 16:52)    I&O's Summary    08 Oct 2019 07:01  -  09 Oct 2019 07:00  --------------------------------------------------------  IN: 540 mL / OUT: 0 mL / NET: 540 mL    09 Oct 2019 07:01  -  09 Oct 2019 13:31  --------------------------------------------------------  IN: 360 mL / OUT: 0 mL / NET: 360 mL        PHYSICAL EXAM:  HEAD:  Atraumatic, Normocephalic  NECK: Supple, No   JVD  CHEST/LUNG:   no     rales,     no,    rhonchi  HEART: Regular rate and rhythm;         murmur  ABDOMEN: Soft, Nontender, ;   EXTREMITIES:   no     edema  NEUROLOGY:  alert    LABS:                        12.5   12.23 )-----------( 331      ( 09 Oct 2019 08:16 )             39.3     10-09    130<L>  |  95<L>  |  40<H>  ----------------------------<  181<H>  4.1   |  21<L>  |  0.79    Ca    9.5      09 Oct 2019 05:47  Mg     1.9     10-08                    Hemoglobin A1C, Whole Blood: 7.4 % (10-06 @ 09:10)    Thyroid Stimulating Hormone, Serum: 2.05 uIU/mL (10-07 @ 09:42)          Consultant(s) Notes Reviewed:      Care Discussed with Consultants/Other Providers:    no  cp/sob

## 2019-10-09 NOTE — PROGRESS NOTE ADULT - SUBJECTIVE AND OBJECTIVE BOX
[facility] DIVISION OF KIDNEY DISEASES AND HYPERTENSION -- FOLLOW UP NOTE  --------------------------------------------------------------------------------  Chief Complaint:/subjective: no acute events, no complaints    24 hour events:as above        PAST HISTORY  --------------------------------------------------------------------------------  No significant changes to PMH, PSH, FHx, SHx, unless otherwise noted    ALLERGIES & MEDICATIONS  --------------------------------------------------------------------------------  Allergies    penicillin (Muscle Pain)    Intolerances      Standing Inpatient Medications  dextrose 5%. 1000 milliLiter(s) IV Continuous <Continuous>  dextrose 50% Injectable 12.5 Gram(s) IV Push once  dextrose 50% Injectable 25 Gram(s) IV Push once  dextrose 50% Injectable 25 Gram(s) IV Push once  furosemide    Tablet 20 milliGRAM(s) Oral daily  insulin lispro (HumaLOG) corrective regimen sliding scale   SubCutaneous three times a day before meals  levothyroxine 112 MICROGram(s) Oral daily  lisinopril 40 milliGRAM(s) Oral daily  metoprolol succinate ER 75 milliGRAM(s) Oral daily  simvastatin 5 milliGRAM(s) Oral at bedtime  Ure-Na 15gram 1 Packet(s) 1 Packet(s) Oral two times a day    PRN Inpatient Medications  dextrose 40% Gel 15 Gram(s) Oral once PRN  glucagon  Injectable 1 milliGRAM(s) IntraMuscular once PRN      REVIEW OF SYSTEMS  --------------------------------------------------------------------------------  Gen: No weight changes, fatigue, fevers/chills, weakness  Skin: No rashes  Head/Eyes/Ears/Mouth: No headache;   Respiratory: No dyspnea, cough  CV: No chest pain, PND, orthopnea  GI: No abdominal pain, diarrhea, constipation, nausea, vomiting  : No increased frequency, dysuria, hematuria, nocturia  MSK: No joint pain/swelling; no back pain; no edema  Neuro: No dizziness/lightheadedness, weakness  Heme: No easy bruising or bleeding  Psych: No significant nervousness, anxiety, stress, depression    All other systems were reviewed and are negative, except as noted.    VITALS/PHYSICAL EXAM  --------------------------------------------------------------------------------  T(C): 36.3 (10-09-19 @ 05:01), Max: 36.8 (10-08-19 @ 11:33)  HR: 70 (10-09-19 @ 05:01) (70 - 76)  BP: 161/76 (10-09-19 @ 05:01) (111/67 - 161/76)  RR: 18 (10-09-19 @ 05:01) (16 - 18)  SpO2: 99% (10-09-19 @ 05:01) (93% - 99%)  Wt(kg): --  Adult Advanced Hemodynamics Last 24 Hrs  ABP: --  ABP(mean): --  CVP(mm Hg): --  CO: --  CI: --  PA: --  PA(mean): --  PCWP: --  SVR: --  SVRI: --        10-08-19 @ 07:01  -  10-09-19 @ 07:00  --------------------------------------------------------  IN: 540 mL / OUT: 0 mL / NET: 540 mL      Physical Exam:  	Gen: NAD,    	HEENT:   no jvp  	Pulm: CTA B/L  	CV: RRR, S1S2; no rub  	Back:   no sacral edema  	Abd: +BS, soft, nontender/nondistended  	: No suprapubic tenderness  	Ext: no edema  	Neuro: awake  	Psych: alert  	Skin: Warm,   	Vascular access:    LABS/STUDIES  --------------------------------------------------------------------------------              12.5   12.23 >-----------<  331      [10-09-19 @ 08:16]              39.3     Hemoglobin: 12.5 g/dL (10-09-19 @ 08:16)  Hemoglobin: 12.2 g/dL (10-08-19 @ 08:19)    Platelet Count - Automated: 331 K/uL (10-09-19 @ 08:16)  Platelet Count - Automated: 304 K/uL (10-08-19 @ 08:19)    130  |  95  |  40  ----------------------------<  181      [10-09-19 @ 05:47]  4.1   |  21  |  0.79        Ca     9.5     [10-09-19 @ 05:47]      Mg     1.9     [10-08-19 @ 13:51]            Creatinine Trend:  SCr 0.79 [10-09 @ 05:47]  SCr 0.80 [10-08 @ 21:57]  SCr 0.84 [10-08 @ 13:51]  SCr 0.73 [10-08 @ 05:57]  SCr 0.74 [10-08 @ 01:27]      Urine Creatinine 39      [10-07-19 @ 04:50]  Urine Protein 32      [10-07-19 @ 04:50]  Urine Sodium 89      [10-07-19 @ 04:50]  Urine Potassium 35      [10-07-19 @ 04:50]  Urine Chloride 89      [10-07-19 @ 04:50]  Urine Osmolality 459      [10-07-19 @ 09:45]    HbA1c 7.4      [10-06-19 @ 09:10]  TSH 2.05      [10-07-19 @ 09:42]

## 2019-10-09 NOTE — PROGRESS NOTE ADULT - ATTENDING COMMENTS
Hyponatremia-euvolemic- suspect SIADH-  u osm high; s osm low; cont urena, lasix; received two doses salt tabs and improved;  monitor trend; check tsh, cortisol normal;
Hyponatremia-euvolemic- suspect SIADH- improving sodium;  cont urena ; monitor trend;
Hyponatremia-euvolemic- suspect SIADH- awaiting u osm; s osm low; cont urena, lasix today; monitor trend; check tsh, cortisol, serum u acid;

## 2019-10-09 NOTE — PROGRESS NOTE ADULT - REASON FOR ADMISSION
confusion
confusion
confussion

## 2019-10-09 NOTE — PROGRESS NOTE ADULT - ASSESSMENT
83F PMHx HTN, HLD, DM2, hypothyroidism transfer from [facility] atraumatic subdural hematoma in setting hypertension sBP 220 (not on blood thinner) for possible neurosurgical intervention however upon arrival to [facility] patient return to baseline.  Neurosurgery consult, no acute intervention.  Nephrology consulted for hyponatremia, on admission serum sodium 139, today downtrend 125 (corrected), normal SCr.

## 2019-10-09 NOTE — PROGRESS NOTE ADULT - ASSESSMENT
83 F presents w/ confusion transferred  from [facility] ,  on cardene drip.    Pt is not on thinners, atraumatic head bleed/  SDH  Family brought pt in, as  pt was  not answering questions normally/  s/p  brief  confusion  , Pt has a pmh of  DM 2, , HTN, HLD   on baby aspirin last dose was day prior to  admission  At   [place], pt was hypertensive w/ SBP 220s,  therfore  pt  wa s tranferred  to  NSG at [facility] who recommended cardene,/     drip wa s stopped	  in er,  alert/  talking well/ no  cp/sob/ headaches     ct head  noted/  rpt ct head  , no  change    CT  with  SDH,   left  tentorium  and  falx   no intervention, per  neurosurgery   hold  asa   HTN,  meds  adjusted/ increased  lisinopril    DM 2, follow fs  mechanical  dvt ppx  PT  eval/ recommend s rehab. pt adamanyly  refusing  blood  c/s, negative      hyponatremia/  pt is euvolemic. on  urena 15  gm, bid/ seen by renal   sodium is  130   today/   fluid  restriction  on lasix  20 qd  pt  afebrile/ mild  wbc  elevation of 12,000/ ID  eval   d/c  when  cleared by  iD/ pt  non toxic  appearing eager  to  go home     < from: CT Head No Cont (10.05.19 @ 16:41) >  IMPRESSION:   Unchanged acute subdural hemorrhage along the falx and left territorial   leaflet.  < end of copied text > 83 F presents w/ confusion transferred  from [facility] ,  on cardene drip.    Pt is not on thinners, atraumatic head bleed/  SDH  Family brought pt in, as  pt was  not answering questions normally/  s/p  brief  confusion  , Pt has a pmh of  DM 2, , HTN, HLD   on baby aspirin last dose was day prior to  admission  At   [place], pt was hypertensive w/ SBP 220s,  therfore  pt  wa s tranferred  to  NSG at [facility] who recommended cardene,/     drip wa s stopped	  in er,  alert/  talking well/ no  cp/sob/ headaches     ct head  noted/  rpt ct head  , no  change    CT  with  SDH,   left  tentorium  and  falx   no intervention, per  neurosurgery   hold  asa   HTN,  meds  adjusted/ increased  lisinopril    DM 2, follow fs  mechanical  dvt ppx  PT  eval/ recommend s rehab. pt adamanyly  refusing  blood  c/s, negative      hyponatremia/  pt is euvolemic. on  urena 15  gm, bid/ seen by renal   sodium is  130   today/   fluid  restriction  on lasix  20 qd  pt  afebrile/ mild  wbc  elevation of 12,000/ ID  eval   d/c  when  cleared by  iD/ pt  non toxic  appearing eager  to  go home  needs  to  f/p  with  neiuro  surgery, in 1  to  2  weeks     < from: CT Head No Cont (10.05.19 @ 16:41) >  IMPRESSION:   Unchanged acute subdural hemorrhage along the falx and left territorial   leaflet.  < end of copied text > 83 F presents w/ confusion transferred  from [facility] ,  on cardene drip.    Pt is not on thinners, atraumatic head bleed/  SDH  Family brought pt in, as  pt was  not answering questions normally/  s/p  brief  confusion  , Pt has a pmh of  DM 2, , HTN, HLD   on baby aspirin last dose was day prior to  admission  At   [place], pt was hypertensive w/ SBP 220s,  therfore  pt  wa s tranferred  to  NSG at [facility] who recommended cardene,/     drip wa s stopped	  in er,  alert/  talking well/ no  cp/sob/ headaches     ct head  noted/  rpt ct head  , no  change    CT  with  SDH,   left  tentorium  and  falx   no intervention, per  neurosurgery   hold  asa   HTN,  meds  adjusted/ increased  lisinopril    DM 2, follow fs  mechanical  dvt ppx  PT  eval/ recommend s rehab. pt adamanyly  refusing  blood  c/s, negative      hyponatremia/  pt is euvolemic. on  urena 15  gm, bid/ seen by renal   sodium is  130   today/   fluid  restriction  on lasix  20 qd  pt  afebrile/ mild  wbc  elevation of 12,000/ ID  eval noted. pt cleraed  spoke  with  daughter who is  a nurse, who  il monitor delta  fs  and  call pts  md, with fs/  will send  home on lower  dose   of insulin/  daughter  will    f/p on pt closely   d/c  when  cleared by  iD/ pt  non toxic  appearing eager  to  go home  needs  to  f/p  with  neiuro  surgery, in 1  to  2  weeks     < from: CT Head No Cont (10.05.19 @ 16:41) >  IMPRESSION:   Unchanged acute subdural hemorrhage along the falx and left territorial   leaflet.  < end of copied text >

## 2019-10-10 ENCOUNTER — INBOUND DOCUMENT (OUTPATIENT)
Age: 84
End: 2019-10-10

## 2019-10-10 PROBLEM — Z00.00 ENCOUNTER FOR PREVENTIVE HEALTH EXAMINATION: Status: ACTIVE | Noted: 2019-10-10

## 2019-10-12 LAB
CULTURE RESULTS: SIGNIFICANT CHANGE UP
CULTURE RESULTS: SIGNIFICANT CHANGE UP
SPECIMEN SOURCE: SIGNIFICANT CHANGE UP
SPECIMEN SOURCE: SIGNIFICANT CHANGE UP

## 2019-11-12 PROCEDURE — 82533 TOTAL CORTISOL: CPT

## 2019-11-12 PROCEDURE — 83735 ASSAY OF MAGNESIUM: CPT

## 2019-11-12 PROCEDURE — 96376 TX/PRO/DX INJ SAME DRUG ADON: CPT

## 2019-11-12 PROCEDURE — 85027 COMPLETE CBC AUTOMATED: CPT

## 2019-11-12 PROCEDURE — 36415 COLL VENOUS BLD VENIPUNCTURE: CPT

## 2019-11-12 PROCEDURE — 84156 ASSAY OF PROTEIN URINE: CPT

## 2019-11-12 PROCEDURE — 80053 COMPREHEN METABOLIC PANEL: CPT

## 2019-11-12 PROCEDURE — 99285 EMERGENCY DEPT VISIT HI MDM: CPT | Mod: 25

## 2019-11-12 PROCEDURE — 93005 ELECTROCARDIOGRAM TRACING: CPT

## 2019-11-12 PROCEDURE — 83930 ASSAY OF BLOOD OSMOLALITY: CPT

## 2019-11-12 PROCEDURE — 84300 ASSAY OF URINE SODIUM: CPT

## 2019-11-12 PROCEDURE — 83935 ASSAY OF URINE OSMOLALITY: CPT

## 2019-11-12 PROCEDURE — 82570 ASSAY OF URINE CREATININE: CPT

## 2019-11-12 PROCEDURE — 97165 OT EVAL LOW COMPLEX 30 MIN: CPT

## 2019-11-12 PROCEDURE — 85610 PROTHROMBIN TIME: CPT

## 2019-11-12 PROCEDURE — 97161 PT EVAL LOW COMPLEX 20 MIN: CPT

## 2019-11-12 PROCEDURE — 82436 ASSAY OF URINE CHLORIDE: CPT

## 2019-11-12 PROCEDURE — 87040 BLOOD CULTURE FOR BACTERIA: CPT

## 2019-11-12 PROCEDURE — 84484 ASSAY OF TROPONIN QUANT: CPT

## 2019-11-12 PROCEDURE — 96375 TX/PRO/DX INJ NEW DRUG ADDON: CPT

## 2019-11-12 PROCEDURE — 80048 BASIC METABOLIC PNL TOTAL CA: CPT

## 2019-11-12 PROCEDURE — 70450 CT HEAD/BRAIN W/O DYE: CPT

## 2019-11-12 PROCEDURE — 84133 ASSAY OF URINE POTASSIUM: CPT

## 2019-11-12 PROCEDURE — 84443 ASSAY THYROID STIM HORMONE: CPT

## 2019-11-12 PROCEDURE — 83036 HEMOGLOBIN GLYCOSYLATED A1C: CPT

## 2019-11-12 PROCEDURE — 82962 GLUCOSE BLOOD TEST: CPT

## 2019-11-12 PROCEDURE — 71045 X-RAY EXAM CHEST 1 VIEW: CPT

## 2019-11-12 PROCEDURE — 85730 THROMBOPLASTIN TIME PARTIAL: CPT

## 2019-11-12 PROCEDURE — 96374 THER/PROPH/DIAG INJ IV PUSH: CPT

## 2020-01-02 NOTE — PROGRESS NOTE ADULT - SUBJECTIVE AND OBJECTIVE BOX
Fwd to Dr Rosa Inman tele, nsr     no compalints    REVIEW OF SYSTEMS:  GEN: no fever,    no chills  RESP: no SOB,   no cough  CVS: no chest pain,   no palpitations  GI: no abdominal pain,   no nausea,   no vomiting,   no constipation,   no diarrhea  : no dysuria,   no frequency  NEURO: no headache,   no dizziness  PSYCH: no depression,   not anxious  Derm : no rash    MEDICATIONS  (STANDING):  dextrose 5%. 1000 milliLiter(s) (50 mL/Hr) IV Continuous <Continuous>  dextrose 50% Injectable 12.5 Gram(s) IV Push once  dextrose 50% Injectable 25 Gram(s) IV Push once  dextrose 50% Injectable 25 Gram(s) IV Push once  furosemide    Tablet 20 milliGRAM(s) Oral daily  insulin lispro (HumaLOG) corrective regimen sliding scale   SubCutaneous three times a day before meals  levothyroxine 112 MICROGram(s) Oral daily  lisinopril 20 milliGRAM(s) Oral daily  metoprolol succinate ER 75 milliGRAM(s) Oral daily  potassium chloride    Tablet ER 40 milliEquivalent(s) Oral every 4 hours  simvastatin 5 milliGRAM(s) Oral at bedtime  Ure-Na 15gram 1 Packet(s) 1 Packet(s) Oral two times a day    MEDICATIONS  (PRN):  dextrose 40% Gel 15 Gram(s) Oral once PRN Blood Glucose LESS THAN 70 milliGRAM(s)/deciliter  glucagon  Injectable 1 milliGRAM(s) IntraMuscular once PRN Glucose LESS THAN 70 milligrams/deciliter  ondansetron Injectable 4 milliGRAM(s) IV Push every 8 hours PRN Nausea      Vital Signs Last 24 Hrs  T(C): 36.8 (07 Oct 2019 04:29), Max: 36.8 (07 Oct 2019 04:29)  T(F): 98.3 (07 Oct 2019 04:29), Max: 98.3 (07 Oct 2019 04:29)  HR: 77 (07 Oct 2019 09:04) (69 - 77)  BP: 187/84 (07 Oct 2019 09:04) (154/80 - 187/84)  BP(mean): --  RR: 17 (07 Oct 2019 04:29) (17 - 18)  SpO2: 97% (07 Oct 2019 09:04) (95% - 97%)  CAPILLARY BLOOD GLUCOSE      POCT Blood Glucose.: 215 mg/dL (07 Oct 2019 08:01)  POCT Blood Glucose.: 155 mg/dL (06 Oct 2019 20:51)  POCT Blood Glucose.: 194 mg/dL (06 Oct 2019 16:53)  POCT Blood Glucose.: 211 mg/dL (06 Oct 2019 11:55)    I&O's Summary    06 Oct 2019 07:01  -  07 Oct 2019 07:00  --------------------------------------------------------  IN: 390 mL / OUT: 500 mL / NET: -110 mL        PHYSICAL EXAM:  HEAD:  Atraumatic, Normocephalic  NECK: Supple, No   JVD  CHEST/LUNG:   no     rales,     no,    rhonchi  HEART: Regular rate and rhythm;         murmur  ABDOMEN: Soft, Nontender, ;   EXTREMITIES:    no    edema  NEUROLOGY:  alert    LABS:                        12.8   11.00 )-----------( 290      ( 07 Oct 2019 08:42 )             39.8     10-07    125<L>  |  89<L>  |  13  ----------------------------<  197<H>  3.5   |  23  |  0.61    Ca    9.2      07 Oct 2019 05:58    TPro  8.1  /  Alb  3.7  /  TBili  0.4  /  DBili  x   /  AST  18  /  ALT  17  /  AlkPhos  68  10-05    PT/INR - ( 05 Oct 2019 12:45 )   PT: 11.7 sec;   INR: 1.07 ratio         PTT - ( 05 Oct 2019 12:45 )  PTT:31.0 sec  CARDIAC MARKERS ( 05 Oct 2019 11:22 )  .000 ng/mL / x     / x     / x     / x                  Hemoglobin A1C, Whole Blood: 7.4 % (10-06 @ 09:10)            Consultant(s) Notes Reviewed:      Care Discussed with Consultants/Other Providers:

## 2020-12-06 ENCOUNTER — INPATIENT (INPATIENT)
Facility: HOSPITAL | Age: 85
LOS: 7 days | Discharge: HOME | DRG: 563 | End: 2020-12-14
Attending: FAMILY MEDICINE | Admitting: FAMILY MEDICINE
Payer: MEDICARE

## 2020-12-06 VITALS
RESPIRATION RATE: 17 BRPM | SYSTOLIC BLOOD PRESSURE: 187 MMHG | HEART RATE: 91 BPM | DIASTOLIC BLOOD PRESSURE: 97 MMHG | TEMPERATURE: 98 F | OXYGEN SATURATION: 97 % | HEIGHT: 65 IN | WEIGHT: 164.91 LBS

## 2020-12-06 DIAGNOSIS — S42.309A UNSPECIFIED FRACTURE OF SHAFT OF HUMERUS, UNSPECIFIED ARM, INITIAL ENCOUNTER FOR CLOSED FRACTURE: ICD-10-CM

## 2020-12-06 DIAGNOSIS — I10 ESSENTIAL (PRIMARY) HYPERTENSION: ICD-10-CM

## 2020-12-06 DIAGNOSIS — S32.592A OTHER SPECIFIED FRACTURE OF LEFT PUBIS, INITIAL ENCOUNTER FOR CLOSED FRACTURE: ICD-10-CM
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
## 2020-12-06 DIAGNOSIS — E11.9 TYPE 2 DIABETES MELLITUS WITHOUT COMPLICATIONS: ICD-10-CM

## 2020-12-06 DIAGNOSIS — E21.0 PRIMARY HYPERPARATHYROIDISM: ICD-10-CM

## 2020-12-06 LAB
A1C WITH ESTIMATED AVERAGE GLUCOSE RESULT: 7.8 % — HIGH (ref 4–5.6)
ALBUMIN SERPL ELPH-MCNC: 3.5 G/DL — SIGNIFICANT CHANGE UP (ref 3.3–5)
ALP SERPL-CCNC: 63 U/L — SIGNIFICANT CHANGE UP (ref 40–120)
ALT FLD-CCNC: 23 U/L — SIGNIFICANT CHANGE UP (ref 12–78)
ANION GAP SERPL CALC-SCNC: 10 MMOL/L — SIGNIFICANT CHANGE UP (ref 5–17)
APPEARANCE UR: ABNORMAL
APTT BLD: 30.2 SEC — SIGNIFICANT CHANGE UP (ref 27.5–35.5)
AST SERPL-CCNC: 22 U/L — SIGNIFICANT CHANGE UP (ref 15–37)
BACTERIA # UR AUTO: ABNORMAL
BASOPHILS # BLD AUTO: 0.04 K/UL — SIGNIFICANT CHANGE UP (ref 0–0.2)
BASOPHILS NFR BLD AUTO: 0.2 % — SIGNIFICANT CHANGE UP (ref 0–2)
BILIRUB SERPL-MCNC: 0.4 MG/DL — SIGNIFICANT CHANGE UP (ref 0.2–1.2)
BILIRUB UR-MCNC: NEGATIVE — SIGNIFICANT CHANGE UP
BUN SERPL-MCNC: 20 MG/DL — SIGNIFICANT CHANGE UP (ref 7–23)
CALCIUM SERPL-MCNC: 9.6 MG/DL — SIGNIFICANT CHANGE UP (ref 8.5–10.1)
CHLORIDE SERPL-SCNC: 105 MMOL/L — SIGNIFICANT CHANGE UP (ref 96–108)
CO2 SERPL-SCNC: 22 MMOL/L — SIGNIFICANT CHANGE UP (ref 22–31)
COLOR SPEC: YELLOW — SIGNIFICANT CHANGE UP
CREAT SERPL-MCNC: 1.1 MG/DL — SIGNIFICANT CHANGE UP (ref 0.5–1.3)
DIFF PNL FLD: ABNORMAL
EOSINOPHIL # BLD AUTO: 0.04 K/UL — SIGNIFICANT CHANGE UP (ref 0–0.5)
EOSINOPHIL NFR BLD AUTO: 0.2 % — SIGNIFICANT CHANGE UP (ref 0–6)
EPI CELLS # UR: SIGNIFICANT CHANGE UP
ESTIMATED AVERAGE GLUCOSE: 177 MG/DL — HIGH (ref 68–114)
GLUCOSE SERPL-MCNC: 238 MG/DL — HIGH (ref 70–99)
GLUCOSE UR QL: 100 MG/DL
HCT VFR BLD CALC: 38 % — SIGNIFICANT CHANGE UP (ref 34.5–45)
HGB BLD-MCNC: 12 G/DL — SIGNIFICANT CHANGE UP (ref 11.5–15.5)
IMM GRANULOCYTES NFR BLD AUTO: 1.3 % — SIGNIFICANT CHANGE UP (ref 0–1.5)
INR BLD: 1.07 RATIO — SIGNIFICANT CHANGE UP (ref 0.88–1.16)
KETONES UR-MCNC: ABNORMAL
LEUKOCYTE ESTERASE UR-ACNC: ABNORMAL
LYMPHOCYTES # BLD AUTO: 1.08 K/UL — SIGNIFICANT CHANGE UP (ref 1–3.3)
LYMPHOCYTES # BLD AUTO: 6.1 % — LOW (ref 13–44)
MCHC RBC-ENTMCNC: 27.6 PG — SIGNIFICANT CHANGE UP (ref 27–34)
MCHC RBC-ENTMCNC: 31.6 GM/DL — LOW (ref 32–36)
MCV RBC AUTO: 87.6 FL — SIGNIFICANT CHANGE UP (ref 80–100)
MONOCYTES # BLD AUTO: 0.85 K/UL — SIGNIFICANT CHANGE UP (ref 0–0.9)
MONOCYTES NFR BLD AUTO: 4.8 % — SIGNIFICANT CHANGE UP (ref 2–14)
NEUTROPHILS # BLD AUTO: 15.4 K/UL — HIGH (ref 1.8–7.4)
NEUTROPHILS NFR BLD AUTO: 87.4 % — HIGH (ref 43–77)
NITRITE UR-MCNC: POSITIVE
NRBC # BLD: 0 /100 WBCS — SIGNIFICANT CHANGE UP (ref 0–0)
PH UR: 5 — SIGNIFICANT CHANGE UP (ref 5–8)
PLATELET # BLD AUTO: 289 K/UL — SIGNIFICANT CHANGE UP (ref 150–400)
POTASSIUM SERPL-MCNC: 4.4 MMOL/L — SIGNIFICANT CHANGE UP (ref 3.5–5.3)
POTASSIUM SERPL-SCNC: 4.4 MMOL/L — SIGNIFICANT CHANGE UP (ref 3.5–5.3)
PROCALCITONIN SERPL-MCNC: <0.05 — SIGNIFICANT CHANGE UP (ref 0–0.04)
PROT SERPL-MCNC: 7.6 G/DL — SIGNIFICANT CHANGE UP (ref 6–8.3)
PROT UR-MCNC: 15
PROTHROM AB SERPL-ACNC: 12.5 SEC — SIGNIFICANT CHANGE UP (ref 10.6–13.6)
RBC # BLD: 4.34 M/UL — SIGNIFICANT CHANGE UP (ref 3.8–5.2)
RBC # FLD: 14.2 % — SIGNIFICANT CHANGE UP (ref 10.3–14.5)
RBC CASTS # UR COMP ASSIST: ABNORMAL /HPF (ref 0–4)
SARS-COV-2 RNA SPEC QL NAA+PROBE: SIGNIFICANT CHANGE UP
SODIUM SERPL-SCNC: 137 MMOL/L — SIGNIFICANT CHANGE UP (ref 135–145)
SP GR SPEC: 1.02 — SIGNIFICANT CHANGE UP (ref 1.01–1.02)
UROBILINOGEN FLD QL: NEGATIVE — SIGNIFICANT CHANGE UP
WBC # BLD: 17.64 K/UL — HIGH (ref 3.8–10.5)
WBC # FLD AUTO: 17.64 K/UL — HIGH (ref 3.8–10.5)
WBC UR QL: ABNORMAL

## 2020-12-06 PROCEDURE — 73090 X-RAY EXAM OF FOREARM: CPT | Mod: 26,LT

## 2020-12-06 PROCEDURE — 71045 X-RAY EXAM CHEST 1 VIEW: CPT | Mod: 26

## 2020-12-06 PROCEDURE — 73060 X-RAY EXAM OF HUMERUS: CPT | Mod: 26,LT

## 2020-12-06 PROCEDURE — 72125 CT NECK SPINE W/O DYE: CPT | Mod: 26

## 2020-12-06 PROCEDURE — 73030 X-RAY EXAM OF SHOULDER: CPT | Mod: 26,LT

## 2020-12-06 PROCEDURE — 72192 CT PELVIS W/O DYE: CPT | Mod: 26

## 2020-12-06 PROCEDURE — 73080 X-RAY EXAM OF ELBOW: CPT | Mod: 26,LT

## 2020-12-06 PROCEDURE — 73110 X-RAY EXAM OF WRIST: CPT | Mod: 26,LT

## 2020-12-06 PROCEDURE — 70450 CT HEAD/BRAIN W/O DYE: CPT | Mod: 26

## 2020-12-06 PROCEDURE — 99285 EMERGENCY DEPT VISIT HI MDM: CPT

## 2020-12-06 PROCEDURE — 73020 X-RAY EXAM OF SHOULDER: CPT | Mod: 26,59,LT

## 2020-12-06 PROCEDURE — 73110 X-RAY EXAM OF WRIST: CPT | Mod: 26,LT,77

## 2020-12-06 RX ORDER — GLUCAGON INJECTION, SOLUTION 0.5 MG/.1ML
1 INJECTION, SOLUTION SUBCUTANEOUS ONCE
Refills: 0 | Status: DISCONTINUED | OUTPATIENT
Start: 2020-12-06 | End: 2020-12-14

## 2020-12-06 RX ORDER — DEXTROSE 50 % IN WATER 50 %
12.5 SYRINGE (ML) INTRAVENOUS ONCE
Refills: 0 | Status: DISCONTINUED | OUTPATIENT
Start: 2020-12-06 | End: 2020-12-14

## 2020-12-06 RX ORDER — DEXTROSE 50 % IN WATER 50 %
15 SYRINGE (ML) INTRAVENOUS ONCE
Refills: 0 | Status: DISCONTINUED | OUTPATIENT
Start: 2020-12-06 | End: 2020-12-14

## 2020-12-06 RX ORDER — MORPHINE SULFATE 50 MG/1
2 CAPSULE, EXTENDED RELEASE ORAL ONCE
Refills: 0 | Status: DISCONTINUED | OUTPATIENT
Start: 2020-12-06 | End: 2020-12-06

## 2020-12-06 RX ORDER — ERTAPENEM SODIUM 1 G/1
1000 INJECTION, POWDER, LYOPHILIZED, FOR SOLUTION INTRAMUSCULAR; INTRAVENOUS EVERY 24 HOURS
Refills: 0 | Status: DISCONTINUED | OUTPATIENT
Start: 2020-12-06 | End: 2020-12-06

## 2020-12-06 RX ORDER — TRAMADOL HYDROCHLORIDE 50 MG/1
25 TABLET ORAL
Refills: 0 | Status: DISCONTINUED | OUTPATIENT
Start: 2020-12-06 | End: 2020-12-13

## 2020-12-06 RX ORDER — INSULIN DETEMIR 100/ML (3)
10 INSULIN PEN (ML) SUBCUTANEOUS
Qty: 0 | Refills: 0 | DISCHARGE

## 2020-12-06 RX ORDER — ACETAMINOPHEN 500 MG
650 TABLET ORAL EVERY 6 HOURS
Refills: 0 | Status: DISCONTINUED | OUTPATIENT
Start: 2020-12-06 | End: 2020-12-14

## 2020-12-06 RX ORDER — UREA 15 G
15 POWDER IN PACKET (EA) ORAL DAILY
Refills: 0 | Status: DISCONTINUED | OUTPATIENT
Start: 2020-12-06 | End: 2020-12-14

## 2020-12-06 RX ORDER — HEPARIN SODIUM 5000 [USP'U]/ML
5000 INJECTION INTRAVENOUS; SUBCUTANEOUS EVERY 12 HOURS
Refills: 0 | Status: DISCONTINUED | OUTPATIENT
Start: 2020-12-06 | End: 2020-12-09

## 2020-12-06 RX ORDER — SIMVASTATIN 20 MG/1
5 TABLET, FILM COATED ORAL AT BEDTIME
Refills: 0 | Status: DISCONTINUED | OUTPATIENT
Start: 2020-12-06 | End: 2020-12-14

## 2020-12-06 RX ORDER — INSULIN LISPRO 100/ML
VIAL (ML) SUBCUTANEOUS
Refills: 0 | Status: DISCONTINUED | OUTPATIENT
Start: 2020-12-06 | End: 2020-12-08

## 2020-12-06 RX ORDER — METOPROLOL TARTRATE 50 MG
100 TABLET ORAL DAILY
Refills: 0 | Status: DISCONTINUED | OUTPATIENT
Start: 2020-12-06 | End: 2020-12-09

## 2020-12-06 RX ORDER — LISINOPRIL 2.5 MG/1
40 TABLET ORAL DAILY
Refills: 0 | Status: DISCONTINUED | OUTPATIENT
Start: 2020-12-06 | End: 2020-12-09

## 2020-12-06 RX ORDER — ONDANSETRON 8 MG/1
4 TABLET, FILM COATED ORAL ONCE
Refills: 0 | Status: COMPLETED | OUTPATIENT
Start: 2020-12-06 | End: 2020-12-06

## 2020-12-06 RX ORDER — DEXTROSE 50 % IN WATER 50 %
25 SYRINGE (ML) INTRAVENOUS ONCE
Refills: 0 | Status: DISCONTINUED | OUTPATIENT
Start: 2020-12-06 | End: 2020-12-14

## 2020-12-06 RX ORDER — LEVOTHYROXINE SODIUM 125 MCG
112 TABLET ORAL DAILY
Refills: 0 | Status: DISCONTINUED | OUTPATIENT
Start: 2020-12-06 | End: 2020-12-14

## 2020-12-06 RX ORDER — OXYCODONE HYDROCHLORIDE 5 MG/1
5 TABLET ORAL
Refills: 0 | Status: DISCONTINUED | OUTPATIENT
Start: 2020-12-06 | End: 2020-12-08

## 2020-12-06 RX ORDER — FOSINOPRIL SODIUM 10 MG/1
1 TABLET ORAL
Qty: 0 | Refills: 0 | DISCHARGE

## 2020-12-06 RX ORDER — ERTAPENEM SODIUM 1 G/1
1000 INJECTION, POWDER, LYOPHILIZED, FOR SOLUTION INTRAMUSCULAR; INTRAVENOUS EVERY 24 HOURS
Refills: 0 | Status: DISCONTINUED | OUTPATIENT
Start: 2020-12-06 | End: 2020-12-07

## 2020-12-06 RX ORDER — INSULIN LISPRO 100/ML
VIAL (ML) SUBCUTANEOUS AT BEDTIME
Refills: 0 | Status: DISCONTINUED | OUTPATIENT
Start: 2020-12-06 | End: 2020-12-08

## 2020-12-06 RX ORDER — LEVOTHYROXINE SODIUM 125 MCG
1 TABLET ORAL
Qty: 0 | Refills: 0 | DISCHARGE

## 2020-12-06 RX ORDER — FUROSEMIDE 40 MG
20 TABLET ORAL DAILY
Refills: 0 | Status: DISCONTINUED | OUTPATIENT
Start: 2020-12-06 | End: 2020-12-09

## 2020-12-06 RX ADMIN — HEPARIN SODIUM 5000 UNIT(S): 5000 INJECTION INTRAVENOUS; SUBCUTANEOUS at 17:56

## 2020-12-06 RX ADMIN — LISINOPRIL 40 MILLIGRAM(S): 2.5 TABLET ORAL at 19:34

## 2020-12-06 RX ADMIN — MORPHINE SULFATE 2 MILLIGRAM(S): 50 CAPSULE, EXTENDED RELEASE ORAL at 16:44

## 2020-12-06 RX ADMIN — ERTAPENEM SODIUM 120 MILLIGRAM(S): 1 INJECTION, POWDER, LYOPHILIZED, FOR SOLUTION INTRAMUSCULAR; INTRAVENOUS at 22:35

## 2020-12-06 RX ADMIN — Medication 1: at 17:56

## 2020-12-06 RX ADMIN — Medication 100 MILLIGRAM(S): at 19:35

## 2020-12-06 RX ADMIN — TRAMADOL HYDROCHLORIDE 25 MILLIGRAM(S): 50 TABLET ORAL at 23:25

## 2020-12-06 RX ADMIN — MORPHINE SULFATE 2 MILLIGRAM(S): 50 CAPSULE, EXTENDED RELEASE ORAL at 14:01

## 2020-12-06 RX ADMIN — ONDANSETRON 4 MILLIGRAM(S): 8 TABLET, FILM COATED ORAL at 14:01

## 2020-12-06 RX ADMIN — TRAMADOL HYDROCHLORIDE 25 MILLIGRAM(S): 50 TABLET ORAL at 22:25

## 2020-12-06 RX ADMIN — SIMVASTATIN 5 MILLIGRAM(S): 20 TABLET, FILM COATED ORAL at 22:35

## 2020-12-06 NOTE — H&P ADULT - NSHPLABSRESULTS_GEN_ALL_CORE
12.0   17.64 )-----------( 289      ( 06 Dec 2020 14:30 )             38.0     06 Dec 2020 14:29    137    |  105    |  20     ----------------------------<  238    4.4     |  22     |  1.10     Ca    9.6        06 Dec 2020 14:29    TPro  7.6    /  Alb  3.5    /  TBili  0.4    /  DBili  x      /  AST  22     /  ALT  23     /  AlkPhos  63     06 Dec 2020 14:29    LIVER FUNCTIONS - ( 06 Dec 2020 14:29 )  Alb: 3.5 g/dL / Pro: 7.6 g/dL / ALK PHOS: 63 U/L / ALT: 23 U/L / AST: 22 U/L / GGT: x           PT/INR - ( 06 Dec 2020 14:29 )   PT: 12.5 sec;   INR: 1.07 ratio         PTT - ( 06 Dec 2020 14:29 )  PTT:30.2 sec  CAPILLARY BLOOD GLUCOSE      POCT Blood Glucose.: 177 mg/dL (06 Dec 2020 17:54)    < from: CT Cervical Spine No Cont (12.06.20 @ 14:52) >    IMPRESSION:    Severe spondylosis. No acute osseous abnormality. Consider MRI as clinically warranted.    < end of copied text >    < from: CT Pelvis Bony Only No Cont (12.06.20 @ 15:03) >    IMPRESSION:    1.  Left superior and inferior pubic rami fractures.  2.  Nondisplaced bilateral sacral alar fractures.  3.  Moderate to severe bilateral hip arthrosis.    < end of copied text >    < from: Xray Shoulder 2 Views, Left (12.06.20 @ 14:35) >    INTERPRETATION:  INDICATION: Left upper extremity pain status post fall.    TECHNIQUE: 3 views of the left shoulder, 2 views of the left humerus, 2 views of the left forearm, and 3 views of the left wrist.    IMPRESSION: The bones appear demineralized. There is a fracture through the left humeral neck. No other fracture is seen in the left humerus. The left humeral head is located within the glenoid. The acromioclavicular joint space appears unremarkable. There is a probable nondisplaced fracture through the distal left radius with slight volar angulation. There is a probable nondisplaced fracture of the left ulnar styloid.    < end of copied text >

## 2020-12-06 NOTE — ED PROVIDER NOTE - CLINICAL SUMMARY MEDICAL DECISION MAKING FREE TEXT BOX
fall, left shoulder, wrist, groin pain, noted to have sts proximal posterior scalp, will obtain labs, imaging, manage pain

## 2020-12-06 NOTE — H&P ADULT - MUSCULOSKELETAL
details… detailed exam ROM intact/no joint erythema/no joint warmth/no joint swelling/no calf tenderness

## 2020-12-06 NOTE — ED PROVIDER NOTE - ATTENDING CONTRIBUTION TO CARE
86yo F pw mechanical fall in her living room landing on her left arm, states no head injury, no loc, no neck or back pain, complains of left shoulder and arm pain with sig swelling and deformity of left shoulder  no hip pain, no chest pain, sob dizziness, prior to fall  on exam tenderes, swelling of left shoulder, + tenderness of prox radius and wrist  + radial pulses intact  + decreased ROM of left arm  no hip tenderness, FROM of hips,  no neck or back tenerness  + swelling noted to head  will get ct head/cspine, xrays left shoulder and arm  pain control  ortho reassess

## 2020-12-06 NOTE — CONSULT NOTE ADULT - SUBJECTIVE AND OBJECTIVE BOX
Patient is a 85y old  Female who presents with a chief complaint of     · HPI Objective Statement: 85 y female presents with mechanical fall in her living room landing on her left arm, states no head injury, no loc, no neck or back pain, complains of left shoulder and arm pain with sig swelling and deformity of left shoulder, left groin pain pain , no hip pain, no chest pain, sob,  dizziness, prior to fall, daughter at bedside.    Reason For Consult: dm2 uncontrolled        PAST MEDICAL & SURGICAL HISTORY:  Primary hyperparathyroidism    HTN (hypertension)    Diabetes mellitus    Hyperlipidemia    Hypothyroid    H/O fracture of humerus    S/P cholecystectomy        FAMILY HISTORY:        Social History:    MEDICATIONS  (STANDING):  dextrose 40% Gel 15 Gram(s) Oral once  dextrose 50% Injectable 25 Gram(s) IV Push once  dextrose 50% Injectable 12.5 Gram(s) IV Push once  dextrose 50% Injectable 25 Gram(s) IV Push once  furosemide    Tablet 20 milliGRAM(s) Oral daily  glucagon  Injectable 1 milliGRAM(s) IntraMuscular once  heparin   Injectable 5000 Unit(s) SubCutaneous every 12 hours  insulin lispro (ADMELOG) corrective regimen sliding scale   SubCutaneous three times a day before meals  insulin lispro (ADMELOG) corrective regimen sliding scale   SubCutaneous at bedtime  levothyroxine 112 MICROGram(s) Oral daily  lisinopril 40 milliGRAM(s) Oral daily  metoprolol succinate  milliGRAM(s) Oral daily  simvastatin 5 milliGRAM(s) Oral at bedtime  urea Oral Powder 15 Gram(s) Oral daily    MEDICATIONS  (PRN):  acetaminophen   Tablet .. 650 milliGRAM(s) Oral every 6 hours PRN Mild Pain (1 - 3)  oxyCODONE    IR 5 milliGRAM(s) Oral four times a day PRN Severe Pain (7 - 10)  traMADol 25 milliGRAM(s) Oral four times a day PRN Moderate Pain (4 - 6)        T(C): 36.4 (12-06-20 @ 16:35), Max: 36.4 (12-06-20 @ 13:17)  HR: 78 (12-06-20 @ 16:35) (78 - 91)  BP: 175/78 (12-06-20 @ 16:35) (175/78 - 187/97)  RR: 16 (12-06-20 @ 16:35) (16 - 17)  SpO2: 96% (12-06-20 @ 16:35) (96% - 97%)  Wt(kg): --    PHYSICAL EXAM:  GENERAL: NAD, well-groomed, well-developed  HEAD:  Atraumatic, Normocephalic  NECK: Supple, No JVD, Normal thyroid  CHEST/LUNG: Clear to percussion bilaterally; No rales, rhonchi, wheezing, or rubs  HEART: Regular rate and rhythm; No murmurs, rubs, or gallops  ABDOMEN: Soft, Nontender, Nondistended; Bowel sounds present  EXTREMITIES:  2+ Peripheral Pulses, No clubbing, cyanosis, or edema  SKIN: No rashes or lesions    CAPILLARY BLOOD GLUCOSE      POCT Blood Glucose.: 177 mg/dL (06 Dec 2020 17:54)                            12.0   17.64 )-----------( 289      ( 06 Dec 2020 14:30 )             38.0       CMP:  12-06 @ 14:29  SGPT 23  Albumin 3.5   Alk Phos 63   Anion Gap 10   SGOT 22   Total Bili 0.4   BUN 20   Calcium Total 9.6   CO2 22   Chloride 105   Creatinine 1.10   eGFR if AA 53   eGFR if non AA 46   Glucose 238   Potassium 4.4   Protein 7.6   Sodium 137      Thyroid Function Tests:      Diabetes Tests:       Radiology:

## 2020-12-06 NOTE — ED PROVIDER NOTE - CARE PLAN
Principal Discharge DX:	Humerus fracture  Secondary Diagnosis:	Pubic ramus fracture  Secondary Diagnosis:	Fall at home, initial encounter  Secondary Diagnosis:	Sacral fracture, closed

## 2020-12-06 NOTE — CONSULT NOTE ADULT - SUBJECTIVE AND OBJECTIVE BOX
85y Female presents to ED c/o severe L wrist pain left shoulder pain s/p mechanical fall. Patient denies head hit or LOC. Localizing pain to distal radius and to the shoulder. Pt was able to ambulate after the fall. Denies radiation of pain. Pt denies numbness, tingling or burning. R Hand Dominant. Patient denies any other injuries.    PMH:  Primary hyperparathyroidism    HTN (hypertension)    Diabetes mellitus    Hyperlipidemia    Hypothyroid    H/O fracture of humerus      PSH:  S/P cholecystectomy      AH:  penicillin (Muscle Pain)    Meds: See med rec    T(C): 36.4 (12-06-20 @ 13:17)  HR: 91 (12-06-20 @ 13:17)  BP: 187/97 (12-06-20 @ 13:17)  RR: 17 (12-06-20 @ 13:17)  SpO2: 97% (12-06-20 @ 13:17)  Wt(kg): --    PE L UE:  Skin intact, visible deformity of wrist, + soft tissue swelling, no ecchymosis; Decreased ROM of Wrist 2/2 pain. Normal Elbow ROM  Reduced Shoulder ROM w/ pain. + TTP over DR/Ulna. + Rad Pulse 2+/4. SILT C5-T1, +AIN/PIN/Ulnar/Radial/Musc/Median, soft compartments;    Secondary Survey: No TTP over RUE/RLE/LLE bony prominences, SILT, palpable pulses, full/painless range of motion, compartments soft. Able to SLR BL LEs, Non TTP over Pubis symphysis BL.    Imaging:  XRay L Wrist  3 views of L Wrist demonstrates L distal radius fracture  XR L Shld: Proximal humerus fracture 2 part      Procedure Note:  After verbal consent obtained, ~ 10cc of 1% Lidocaine injected into area around DR/Ulna as hematoma block. Elbow was held to stabilize the upper extremity, and assistant performed a reduction maneuve. Sugartong splint applied to Forearm/Wrist and mold held. IN XR obtained which show improved alignment of L DR Fracture. Pt NVI post procedure. Pt tolerated procedure well.    A[facility]P: 85[facility] Distal Radius Fracture, L Proximal Humerus Fracture and LC1 pelvic fracture    -Pt reports she ambulates with cane at baseline  - Pain control  - Strict Ice/Elevation of the LUE  - NWB L UE with splint and sling  - Keep splint clean/dry/intact;  -WBAT Bilateral LEs   - Encourage active finger motion to help with swelling  - Pt aware of possible need for surgical intervention of distal radius fracture. Pt would like to explore non operative options at this time.   - Pt made aware of signs and symptoms of compartment syndrome.   -Likley social admit for inability to ambulate and pain control  - All Pt's / Family Members questions answered, Pt/family understand plan.  -Case was discussed with Dr. Minor  -No further ortho intervention at this time  -Ortho stable  -Will advise if there are changes to the plan.

## 2020-12-06 NOTE — ED ADULT NURSE NOTE - OBJECTIVE STATEMENT
patient came in ED from home with c/o left shoulder pain, left wrist pain and left groin discomfort. s/p tripped and fall at home in the living room. patient denies head injury but swelling on the scalp noted. no skin break noted. left shoulder noted swollen with very limited movement and tender to palpation. alert and oriented X 4. afebrile. denies chest pain. denies dizziness. non-labored respiration noted. left arm placed on a sling.

## 2020-12-06 NOTE — ED PROVIDER NOTE - OBJECTIVE STATEMENT
85 y female presents with mechanical fall in her living room landing on her left arm, states no head injury, no loc, no neck or back pain, complains of left shoulder and arm pain with sig swelling and deformity of left shoulder, left groin pain pain , no hip pain, no chest pain, sob,  dizziness, prior to fall, daughter at bedside.

## 2020-12-06 NOTE — ED PROVIDER NOTE - PROGRESS NOTE DETAILS
spoke with ortho resident, case discussed, will see patient spoke with Dr Griggs, case discussed, results discussed, results discussed, will admit patient

## 2020-12-06 NOTE — H&P ADULT - HISTORY OF PRESENT ILLNESS
· Chief Complaint: The patient is a 85y Female complaining of fall.  · HPI Objective Statement: 85 y female presents with mechanical fall in her living room landing on her left arm, states no head injury, no loc, no neck or back pain, complains of left shoulder and arm pain with sig swelling and deformity of left shoulder, left groin pain, no hip pain, no chest pain, sob,  dizziness, prior to fall, daughter at bedside.  No loss of consciousness   GINGER BRISENO is an 85 y female presented after mechanical fall in her living room landing on her left arm, states no head injury, no LOC, no neck or back pain, complains of left shoulder and arm pain with sig swelling and deformity of left shoulder, left groin pain, no hip pain, no chest pain, sob,  dizziness, prior to fall, daughter at bedside.  No loss of consciousness

## 2020-12-06 NOTE — ED ADULT NURSE NOTE - CHPI ED NUR SYMPTOMS NEG
no abrasion/no bleeding/no fever/no loss of consciousness/no numbness/no weakness/no tingling/no confusion/no vomiting

## 2020-12-06 NOTE — H&P ADULT - NEUROLOGICAL DETAILS
no spontaneous movement/sensation intact/deep reflexes intact/cranial nerves intact/alert and oriented x 3

## 2020-12-07 DIAGNOSIS — D72.829 ELEVATED WHITE BLOOD CELL COUNT, UNSPECIFIED: ICD-10-CM

## 2020-12-07 LAB
HCT VFR BLD CALC: 32.9 % — LOW (ref 34.5–45)
HGB BLD-MCNC: 10.5 G/DL — LOW (ref 11.5–15.5)
MCHC RBC-ENTMCNC: 27.7 PG — SIGNIFICANT CHANGE UP (ref 27–34)
MCHC RBC-ENTMCNC: 31.9 GM/DL — LOW (ref 32–36)
MCV RBC AUTO: 86.8 FL — SIGNIFICANT CHANGE UP (ref 80–100)
NRBC # BLD: 0 /100 WBCS — SIGNIFICANT CHANGE UP (ref 0–0)
PLATELET # BLD AUTO: 259 K/UL — SIGNIFICANT CHANGE UP (ref 150–400)
RBC # BLD: 3.79 M/UL — LOW (ref 3.8–5.2)
RBC # FLD: 14.3 % — SIGNIFICANT CHANGE UP (ref 10.3–14.5)
SARS-COV-2 IGG SERPL QL IA: NEGATIVE — SIGNIFICANT CHANGE UP
SARS-COV-2 IGM SERPL IA-ACNC: 0.08 INDEX — SIGNIFICANT CHANGE UP
WBC # BLD: 12.34 K/UL — HIGH (ref 3.8–10.5)
WBC # FLD AUTO: 12.34 K/UL — HIGH (ref 3.8–10.5)

## 2020-12-07 PROCEDURE — 93010 ELECTROCARDIOGRAM REPORT: CPT

## 2020-12-07 RX ORDER — INSULIN GLARGINE 100 [IU]/ML
10 INJECTION, SOLUTION SUBCUTANEOUS EVERY MORNING
Refills: 0 | Status: DISCONTINUED | OUTPATIENT
Start: 2020-12-08 | End: 2020-12-11

## 2020-12-07 RX ORDER — TAMSULOSIN HYDROCHLORIDE 0.4 MG/1
0.4 CAPSULE ORAL AT BEDTIME
Refills: 0 | Status: DISCONTINUED | OUTPATIENT
Start: 2020-12-07 | End: 2020-12-11

## 2020-12-07 RX ORDER — INSULIN GLARGINE 100 [IU]/ML
10 INJECTION, SOLUTION SUBCUTANEOUS ONCE
Refills: 0 | Status: COMPLETED | OUTPATIENT
Start: 2020-12-07 | End: 2020-12-07

## 2020-12-07 RX ADMIN — OXYCODONE HYDROCHLORIDE 5 MILLIGRAM(S): 5 TABLET ORAL at 15:40

## 2020-12-07 RX ADMIN — INSULIN GLARGINE 10 UNIT(S): 100 INJECTION, SOLUTION SUBCUTANEOUS at 11:51

## 2020-12-07 RX ADMIN — OXYCODONE HYDROCHLORIDE 5 MILLIGRAM(S): 5 TABLET ORAL at 03:00

## 2020-12-07 RX ADMIN — OXYCODONE HYDROCHLORIDE 5 MILLIGRAM(S): 5 TABLET ORAL at 14:37

## 2020-12-07 RX ADMIN — SIMVASTATIN 5 MILLIGRAM(S): 20 TABLET, FILM COATED ORAL at 21:18

## 2020-12-07 RX ADMIN — Medication 20 MILLIGRAM(S): at 05:52

## 2020-12-07 RX ADMIN — LISINOPRIL 40 MILLIGRAM(S): 2.5 TABLET ORAL at 05:52

## 2020-12-07 RX ADMIN — Medication 2: at 11:51

## 2020-12-07 RX ADMIN — OXYCODONE HYDROCHLORIDE 5 MILLIGRAM(S): 5 TABLET ORAL at 02:38

## 2020-12-07 RX ADMIN — HEPARIN SODIUM 5000 UNIT(S): 5000 INJECTION INTRAVENOUS; SUBCUTANEOUS at 06:05

## 2020-12-07 RX ADMIN — Medication 1: at 16:42

## 2020-12-07 RX ADMIN — Medication 100 MILLIGRAM(S): at 05:52

## 2020-12-07 RX ADMIN — HEPARIN SODIUM 5000 UNIT(S): 5000 INJECTION INTRAVENOUS; SUBCUTANEOUS at 18:19

## 2020-12-07 RX ADMIN — TAMSULOSIN HYDROCHLORIDE 0.4 MILLIGRAM(S): 0.4 CAPSULE ORAL at 21:18

## 2020-12-07 RX ADMIN — Medication 2: at 08:13

## 2020-12-07 RX ADMIN — Medication 112 MICROGRAM(S): at 05:52

## 2020-12-07 NOTE — CONSULT NOTE ADULT - PROBLEM SELECTOR RECOMMENDATION 2
Mechanical fall at home - now with left distal radius fracture, left proximal humerus fracture and LC1 pelvic fracture. Orthopedics following, patient would like nonoperative management at this time.

## 2020-12-07 NOTE — CONSULT NOTE ADULT - SUBJECTIVE AND OBJECTIVE BOX
[facility], Division of Infectious Diseases  MATILDE Cavanaugh, LEN Bolaños  [phone]  ([phone] - weekdays after 5pm and weekends)    GINGER BRISENO  85y, Female  [id number]    HPI:  Patient is a 85 year old female with PMH of HTN, DM, HLD who presented after having a mechanical fall in her living room. Patient reports she was getting ready to go to her daughters house for dinner when she hit the coffee table and tripped and fell, landing on her left arm. She denies having any head injuries, loss of consciousness, neck or back pain. She reported pain in her left shoulder and arm with significant swelling and deformity of her left shoulder. She denied having any left groin pain, hip pain, chest pain, dyspnea, or dizziness prior to fall. Patient found to have a left distal radius fracture, left proximal humerus fracture and LC1 pelvic fracture. Patient also noted to have a positive urinalysis and was started on ertapenem due to penicillin allergy. ID consulted for further antibiotic management.   Patient seen this morning, reports she has some pain in her left shoulder, feels her left wrist pain is controlled. Patient states she has not had any dysuria, hesitancy, urgency, increased frequency, back or flank pain. She denies having any fevers or chills. She denies having any abdominal pain, nausea, vomiting, diarrhea.   ROS: 14 point review of systems completed, pertinent positives and negatives as per HPI.    Allergies: penicillin (facial swelling after having penicillin after her 1st child)  PMH -- Primary hyperparathyroidism, HTN (hypertension), Diabetes mellitus, Hyperlipidemia, Hypothyroid  PSH -- S/P cholecystectomy  FH -- noncontributory   Social History -- denies tobacco, alcohol or illicit drug use, , lives at home alone in a senior citizen coop    Physical Exam--  Vital Signs Last 24 Hrs  T(F): 98 (07 Dec 2020 04:59), Max: 99.6 (07 Dec 2020 00:33)  HR: 75 (07 Dec 2020 04:59) (75 - 92)  BP: 134/92 (07 Dec 2020 04:59) (134/92 - 187/97)  RR: 18 (07 Dec 2020 04:59) (16 - 18)  SpO2: 93% (07 Dec 2020 04:59) (92% - 97%)  General: nontoxic-appearing, no acute distress  HEENT: NC/AT, EOMI, anicteric, conjunctiva pink and moist, neck supple  Lungs: Clear bilaterally without rales, wheezing or rhonchi  Heart: Regular rate and rhythm. No murmur, rub or gallop.  Abdomen: Soft. Nondistended. Nontender. BS present.  Neuro: AAOx3, no obvious focal deficits   Back: No spinal tenderness. No costovertebral angle tenderness.  Extremities: LUE in cast, ace bandage and sling. No cyanosis. No edema.   Skin: Warm. Dry. Good turgor. No rash.   Psychiatric: Appropriate affect and mood for situation.   Lines: PIV    Laboratory & Imaging Data--  CBC:                       10.5   12.34 )-----------( 259      ( 07 Dec 2020 06:17 )             32.9     CMP: 12-    137  |  105  |  20  ----------------------------<  238<H>  4.4   |  22  |  1.10    Ca    9.6      06 Dec 2020 14:29    TPro  7.6  /  Alb  3.5  /  TBili  0.4  /  DBili  x   /  AST  22  /  ALT  23  /  AlkPhos  63  12-06    LIVER FUNCTIONS - ( 06 Dec 2020 14:29 )  Alb: 3.5 g/dL / Pro: 7.6 g/dL / ALK PHOS: 63 U/L / ALT: 23 U/L / AST: 22 U/L / GGT: x           Urinalysis Basic - ( 06 Dec 2020 20:28 )  Color: Yellow / Appearance: Slightly Turbid / S.025 / pH: x  Gluc: x / Ketone: Trace  / Bili: Negative / Urobili: Negative   Blood: x / Protein: 15 / Nitrite: Positive   Leuk Esterase: Small / RBC: 6-10 /HPF / WBC 11-25   Sq Epi: x / Non Sq Epi: Few / Bacteria: Many    Microbiology:    - COVID-19 ab - negative   - COVID-19 PCR - negative     Radiology--  Xray Wrist 3 Views, Left (20 @ 16:35) > IMPRESSION: Casting. Distal radial fracture and ulnar styloid tip fracture again noted. Imaging prior also suggested a possible navicular waist fracture.    Xray Shoulder Axillary View, Left (. @ 16:34) >Impression: Acute fracture proximal left humerus described in a separate report. Normal alignment at the glenohumeral joint.    Xray Elbow AP + Lateral + Oblique, Left (. @ 15:40) >IMPRESSION: No acute bony pathology. Note - This does not exclude fractures in perfect alignment and apposition as presence may be indicated at one to three weeks following callus formation.    CT Pelvis Bony Only No Cont (.. @ 15:03) >IMPRESSION: 1.  Left superior and inferior pubic rami fractures. 2.  Nondisplaced bilateral sacral alar fractures. 3.  Moderate to severe bilateral hip arthrosis.     CT Head No Cont (20 @ 14:55) > IMPRESSION:  Mild chronic microvascular changes without evidence of an acute transcortical infarction or hemorrhage. Consider follow-up CT or MRI as clinically warranted.    CT Cervical Spine No Cont (20 @ 14:52) >IMPRESSION: Severe spondylosis. No acute osseous abnormality. Consider MRI as clinically warranted.    Xray Chest 1 View- PORTABLE-Urgent (Xray Chest 1 View- PORTABLE-Urgent .) (..20 @ 14:36) > IMPRESSION: Clear lungs. Fracture through the left humeral neck.    Xray Forearm, Left (.. @ 14:36) > IMPRESSION: The bones appear demineralized. There is a fracture through the left humeral neck. No other fracture is seen in the left humerus. The left humeral head is located within the glenoid. The acromioclavicular joint space appears unremarkable. There is a probable nondisplaced fracture through the distal left radius with slight volar angulation. There is a probable nondisplaced fracture of the left ulnar styloid.    Active Medications--  acetaminophen   Tablet .. 650 milliGRAM(s) Oral every 6 hours PRN  dextrose 40% Gel 15 Gram(s) Oral once  dextrose 50% Injectable 25 Gram(s) IV Push once  dextrose 50% Injectable 12.5 Gram(s) IV Push once  dextrose 50% Injectable 25 Gram(s) IV Push once  ertapenem  IVPB 1000 milliGRAM(s) IV Intermittent every 24 hours  furosemide    Tablet 20 milliGRAM(s) Oral daily  glucagon  Injectable 1 milliGRAM(s) IntraMuscular once  heparin   Injectable 5000 Unit(s) SubCutaneous every 12 hours  insulin glargine Injectable (LANTUS) 10 Unit(s) SubCutaneous once  insulin lispro (ADMELOG) corrective regimen sliding scale   SubCutaneous three times a day before meals  insulin lispro (ADMELOG) corrective regimen sliding scale   SubCutaneous at bedtime  levothyroxine 112 MICROGram(s) Oral daily  lisinopril 40 milliGRAM(s) Oral daily  metoprolol succinate  milliGRAM(s) Oral daily  oxyCODONE    IR 5 milliGRAM(s) Oral four times a day PRN  simvastatin 5 milliGRAM(s) Oral at bedtime  traMADol 25 milliGRAM(s) Oral four times a day PRN  urea Oral Powder 15 Gram(s) Oral daily    Antimicrobials:   ertapenem  IVPB 1000 milliGRAM(s) IV Intermittent every 24 hours - started

## 2020-12-07 NOTE — OCCUPATIONAL THERAPY INITIAL EVALUATION ADULT - RANGE OF MOTION EXAMINATION, UPPER EXTREMITY
Right UE Active ROM was WFL (within functional limits)/Left UE: not assessed due to NWB and +fx proximal humerus and distal radius. Fine motor skills: WFL's right UE only. Arthritic changes noted bilateral hands.

## 2020-12-07 NOTE — PROGRESS NOTE ADULT - SUBJECTIVE AND OBJECTIVE BOX
Pt seen and examined this morning at the bedside, she was complaining of only some L shoulder pain this morning. Her left wrist pain is well controlled.     Vital Signs Last 24 Hrs  T(C): 36.7 (07 Dec 2020 04:59), Max: 37.6 (07 Dec 2020 00:33)  T(F): 98 (07 Dec 2020 04:59), Max: 99.6 (07 Dec 2020 00:33)  HR: 75 (07 Dec 2020 04:59) (75 - 92)  BP: 134/92 (07 Dec 2020 04:59) (134/92 - 187/97)  BP(mean): --  RR: 18 (07 Dec 2020 04:59) (16 - 18)  SpO2: 93% (07 Dec 2020 04:59) (92% - 97%)    PE:  NAD, awake and alert.     PE L UE:  In sugar tong splint and Sling.   Moving fingers, cap refill < 2 sec, +AIN/PIN/Axillary/MSC motor. SILT, Compartments soft and compressible.   TTP over the proximal humerus.   Exam limited due to splint.     Imaging:  XRay L Wrist  3 views of L Wrist demonstrates L distal radius fracture  XR L Shld: Proximal humerus fracture 2 part

## 2020-12-07 NOTE — DIETITIAN INITIAL EVALUATION ADULT. - OTHER INFO
85 year old female Dx fall pelvic , left humerus and left distal radius fracture PMH HTN hyperparathyroidism DM hypothyroid hyperlipidemia  patient reports ate egg juice coffee and strawberries this AM.  no recent weight change  states for ~ 2 years PO varies  on insulin at home checks blood sugars at night

## 2020-12-07 NOTE — PROGRESS NOTE ADULT - ASSESSMENT
A/P: 85y Female s/p Mech Fall w/ L Distal Radius Fracture, L Proximal Humerus Fracture and LC1 pelvic fracture    -Pt reports she ambulates with cane at baseline  - Pain control  - Strict Ice/Elevation of the LUE  - NWB L UE with splint and sling  - Keep splint clean/dry/intact;  -WBAT Bilateral LEs   - Encourage active finger motion to help with swelling  - Pt aware of possible need for surgical intervention of distal radius fracture. Pt would like to explore non operative options at this time.   - Pt made aware of signs and symptoms of compartment syndrome.   -Likley social admit for inability to ambulate and pain control  - All Pt's / Family Members questions answered, Pt/family understand plan.  -Case was discussed with Dr. Minor  -No further ortho intervention at this time  -Ortho stable for discharge. No further intervention planned at this point.   -FU w/ Dr Minor outpatient, call office for appt.   -Will advise if there are changes to the plan.

## 2020-12-07 NOTE — PROGRESS NOTE ADULT - SUBJECTIVE AND OBJECTIVE BOX
Patient is a 85y old  Female who presents with a chief complaint of Pelvic and left humerus fracture (07 Dec 2020 11:00)      SUBJECTIVE / OVERNIGHT EVENTS: No events over night     MEDICATIONS  (STANDING):  dextrose 40% Gel 15 Gram(s) Oral once  dextrose 50% Injectable 25 Gram(s) IV Push once  dextrose 50% Injectable 12.5 Gram(s) IV Push once  dextrose 50% Injectable 25 Gram(s) IV Push once  furosemide    Tablet 20 milliGRAM(s) Oral daily  glucagon  Injectable 1 milliGRAM(s) IntraMuscular once  heparin   Injectable 5000 Unit(s) SubCutaneous every 12 hours  insulin lispro (ADMELOG) corrective regimen sliding scale   SubCutaneous three times a day before meals  insulin lispro (ADMELOG) corrective regimen sliding scale   SubCutaneous at bedtime  levothyroxine 112 MICROGram(s) Oral daily  lisinopril 40 milliGRAM(s) Oral daily  metoprolol succinate  milliGRAM(s) Oral daily  simvastatin 5 milliGRAM(s) Oral at bedtime  tamsulosin 0.4 milliGRAM(s) Oral at bedtime  urea Oral Powder 15 Gram(s) Oral daily    MEDICATIONS  (PRN):  acetaminophen   Tablet .. 650 milliGRAM(s) Oral every 6 hours PRN Mild Pain (1 - 3)  oxyCODONE    IR 5 milliGRAM(s) Oral four times a day PRN Severe Pain (7 - 10)  traMADol 25 milliGRAM(s) Oral four times a day PRN Moderate Pain (4 - 6)      CAPILLARY BLOOD GLUCOSE      POCT Blood Glucose.: 221 mg/dL (07 Dec 2020 21:06)  POCT Blood Glucose.: 190 mg/dL (07 Dec 2020 16:29)  POCT Blood Glucose.: 226 mg/dL (07 Dec 2020 11:44)  POCT Blood Glucose.: 228 mg/dL (07 Dec 2020 07:18)    I&O's Summary    07 Dec 2020 07:01  -  07 Dec 2020 23:43  --------------------------------------------------------  IN: 640 mL / OUT: 3225 mL / NET: -2585 mL      T(C): 36.8 (20 @ 21:45), Max: 36.9 (20 @ 13:04)  HR: 89 (20 @ 22:26) (80 - 91)  BP: 163/79 (20 @ 22:26) (161/72 - 171/84)  RR: 18 (20 @ 21:45) (18 - 20)  SpO2: 95% (20 @ 21:45) (94% - 95%)    PHYSICAL EXAM:  GENERAL: NAD, well-developed  HEAD:  Atraumatic, Normocephalic  EYES: EOMI, PERRLA, conjunctiva and sclera clear  NECK: Supple, No JVD  CHEST/LUNG: Clear to auscultation bilaterally; No wheeze  HEART: Regular rate and rhythm; No murmurs, rubs, or gallops  ABDOMEN: Soft, Nontender, Nondistended; Bowel sounds present  EXTREMITIES:  2+ Peripheral Pulses, No clubbing, cyanosis, or edema  PSYCH: AAOx3  NEUROLOGY: non-focal  SKIN: No rashes or lesions    LABS:                        10.5   12.34 )-----------( 259      ( 07 Dec 2020 06:17 )             32.9     12-    137  |  105  |  20  ----------------------------<  238<H>  4.4   |  22  |  1.10    Ca    9.6      06 Dec 2020 14:29    TPro  7.6  /  Alb  3.5  /  TBili  0.4  /  DBili  x   /  AST  22  /  ALT  23  /  AlkPhos  63  12-06    PT/INR - ( 06 Dec 2020 14:29 )   PT: 12.5 sec;   INR: 1.07 ratio         PTT - ( 06 Dec 2020 14:29 )  PTT:30.2 sec      Urinalysis Basic - ( 06 Dec 2020 20:28 )    Color: Yellow / Appearance: Slightly Turbid / S.025 / pH: x  Gluc: x / Ketone: Trace  / Bili: Negative / Urobili: Negative   Blood: x / Protein: 15 / Nitrite: Positive   Leuk Esterase: Small / RBC: 6-10 /HPF / WBC 11-25   Sq Epi: x / Non Sq Epi: Few / Bacteria: Many        RADIOLOGY & ADDITIONAL TESTS:    Imaging Personally Reviewed:    Consultant(s) Notes Reviewed:      Care Discussed with Consultants/Other Providers:

## 2020-12-07 NOTE — CONSULT NOTE ADULT - PROBLEM SELECTOR RECOMMENDATION 3
likely reactive in setting of above fall and fractures. WBC improved, trended down to ~12k. Afebrile. No localizing symptoms other than pain from fractures.

## 2020-12-07 NOTE — OCCUPATIONAL THERAPY INITIAL EVALUATION ADULT - ADDITIONAL COMMENTS
Patient ambulated using a cane. Patient lives alone in a 2 level house with no steps to enter and +chairlift to 2nd level. Patient has a stall shower with built-in seat. Patient reports that she ambulated using a cane only in the morning. Patient is right hand dominant. Patient does not drive. Patient requires assistance with ADL's and transfers due to NWB left UE, PWB bilateral LE's, decreased strength, decreased endurance and impaired sitting/standing balance.

## 2020-12-07 NOTE — PHYSICAL THERAPY INITIAL EVALUATION ADULT - RANGE OF MOTION EXAMINATION, REHAB EVAL
Right UE ROM was WNL (within normal limits)/bilateral lower extremity ROM was WFL (within functional limits)/L UE NT due to immobilized in sling

## 2020-12-07 NOTE — PHYSICAL THERAPY INITIAL EVALUATION ADULT - PERTINENT HX OF CURRENT PROBLEM, REHAB EVAL
84 y/o female admitted on 12/6/2020 with +left proximal humerus fx, left distal radius fx and left superior and inferior pubic rami fractures, nondisplaced bilateral sacral alar fractures and moderate to severe bilateral hip arthrosis s/p fall at home.

## 2020-12-07 NOTE — PHYSICAL THERAPY INITIAL EVALUATION ADULT - ADDITIONAL COMMENTS
Prior to fall, pt was independent with all activities; ambulated with cane when needed. Pt owns a RW and chairlift inside home. 0 steps to enter house. Pt lives alone, daughter lives close by and visits 3x a week to help out. Pt has a stall shower, has a bench inside shower and no grab bars. Prior to fall, pt was independent with all activities; ambulated with cane when needed. Pt owns a RW and chair lift inside home. 0 steps to enter house. Pt lives alone, daughter lives close by and visits 3x a week to help out. Daughter is an RN. Pt has a stall shower, has a bench inside shower and no grab bars.

## 2020-12-07 NOTE — OCCUPATIONAL THERAPY INITIAL EVALUATION ADULT - TRANSFER TRAINING, PT EVAL
[phone]
Assess transfers with sliding board and/or appropriate DME/AD's pending weight bearing status in 2-4 sessions.

## 2020-12-07 NOTE — OCCUPATIONAL THERAPY INITIAL EVALUATION ADULT - GENERAL OBSERVATIONS, REHAB EVAL
Pt found supine in bed with +sugar tong splint and sling left UE, +IV, +SCD, +bandage, supplemental 02 Pt found supine in bed with +sugar tong splint and sling left UE, +IV lock and +Chin catheter. Pt c/o +nausea while seated on EOB; patient assisted to supine in bed with max assist x 2.

## 2020-12-07 NOTE — PROGRESS NOTE ADULT - SUBJECTIVE AND OBJECTIVE BOX
CAPILLARY BLOOD GLUCOSE      POCT Blood Glucose.: 228 mg/dL (07 Dec 2020 07:18)  POCT Blood Glucose.: 184 mg/dL (06 Dec 2020 22:20)  POCT Blood Glucose.: 191 mg/dL (06 Dec 2020 22:02)  POCT Blood Glucose.: 177 mg/dL (06 Dec 2020 17:54)      Vital Signs Last 24 Hrs  T(C): 36.7 (07 Dec 2020 04:59), Max: 37.6 (07 Dec 2020 00:33)  T(F): 98 (07 Dec 2020 04:59), Max: 99.6 (07 Dec 2020 00:33)  HR: 75 (07 Dec 2020 04:59) (75 - 92)  BP: 134/92 (07 Dec 2020 04:59) (134/92 - 187/97)  BP(mean): --  RR: 18 (07 Dec 2020 04:59) (16 - 18)  SpO2: 93% (07 Dec 2020 04:59) (92% - 97%)    General: WN/WD NAD  Respiratory: CTA B/L  CV: RRR, S1S2, no murmurs, rubs or gallops  Abdominal: Soft, NT, ND +BS, Last BM  Extremities: No edema, + peripheral pulses     12-06    137  |  105  |  20  ----------------------------<  238<H>  4.4   |  22  |  1.10    Ca    9.6      06 Dec 2020 14:29    TPro  7.6  /  Alb  3.5  /  TBili  0.4  /  DBili  x   /  AST  22  /  ALT  23  /  AlkPhos  63  12-06      dextrose 40% Gel 15 Gram(s) Oral once  dextrose 50% Injectable 25 Gram(s) IV Push once  dextrose 50% Injectable 12.5 Gram(s) IV Push once  dextrose 50% Injectable 25 Gram(s) IV Push once  glucagon  Injectable 1 milliGRAM(s) IntraMuscular once  insulin lispro (ADMELOG) corrective regimen sliding scale   SubCutaneous three times a day before meals  insulin lispro (ADMELOG) corrective regimen sliding scale   SubCutaneous at bedtime  levothyroxine 112 MICROGram(s) Oral daily  simvastatin 5 milliGRAM(s) Oral at bedtime

## 2020-12-07 NOTE — CONSULT NOTE ADULT - PROBLEM SELECTOR RECOMMENDATION 9
Positive urinalysis with 11-25 WBC, positive nitrite, small LE and many bacteria  Patient with no urinary symptoms prior to admission or at this time. No indwelling catheter. No fever/chills.   Started on Ertapenem in setting of penicillin allergy  Discontinue ertapenem - would not treat as patient asymptomatic

## 2020-12-07 NOTE — PHYSICAL THERAPY INITIAL EVALUATION ADULT - GENERAL OBSERVATIONS, REHAB EVAL
Pt found supine in bed with +sugar tong splint and sling left UE, +IV, +SCD, +bandage, supplemental O2 Pt found supine in bed with +sugar tong splint and sling left UE, +bandage

## 2020-12-07 NOTE — CONSULT NOTE ADULT - ASSESSMENT
Patient is a 85 year old female with PMH of HTN, DM, HLD, and primary hyperparathyroidism who presented after having a mechanical fall in her living room. Patient admitted with left distal radius fracture, left proximal humerus fracture and LC1 pelvic fracture. Patient also noted to have a positive urinalysis and was started on ertapenem due to penicillin allergy.

## 2020-12-07 NOTE — OCCUPATIONAL THERAPY INITIAL EVALUATION ADULT - PERTINENT HX OF CURRENT PROBLEM, REHAB EVAL
86 y/o female admitted on 12/6/2020 with +left proximal humerus fx, left distal radius fx and left superior and inferior pubic rami fractures, nondisplaced bilateral sacral alar fractures and moderate to severe bilateral hip arthrosis s/p fall at home.

## 2020-12-07 NOTE — PHYSICAL THERAPY INITIAL EVALUATION ADULT - CRITERIA FOR SKILLED THERAPEUTIC INTERVENTIONS
impairments found/anticipated discharge recommendation/functional limitations in following categories/therapy frequency/rehab potential

## 2020-12-07 NOTE — CONSULT NOTE ADULT - ATTENDING COMMENTS
ID will sign off at this time but remains available for any further questions/concerns.    Magi Rodríguez M.D.  [facility], Division of Infectious Diseases  [phone]  After 5pm on weekdays and all day on weekends - please call [phone]

## 2020-12-08 PROCEDURE — 73100 X-RAY EXAM OF WRIST: CPT | Mod: 26,LT

## 2020-12-08 RX ORDER — SENNA PLUS 8.6 MG/1
2 TABLET ORAL AT BEDTIME
Refills: 0 | Status: DISCONTINUED | OUTPATIENT
Start: 2020-12-08 | End: 2020-12-14

## 2020-12-08 RX ORDER — POLYETHYLENE GLYCOL 3350 17 G/17G
17 POWDER, FOR SOLUTION ORAL DAILY
Refills: 0 | Status: DISCONTINUED | OUTPATIENT
Start: 2020-12-08 | End: 2020-12-11

## 2020-12-08 RX ORDER — INSULIN LISPRO 100/ML
VIAL (ML) SUBCUTANEOUS
Refills: 0 | Status: DISCONTINUED | OUTPATIENT
Start: 2020-12-08 | End: 2020-12-14

## 2020-12-08 RX ORDER — INSULIN HUMAN 100 [IU]/ML
INJECTION, SOLUTION SUBCUTANEOUS EVERY 6 HOURS
Refills: 0 | Status: DISCONTINUED | OUTPATIENT
Start: 2020-12-08 | End: 2020-12-08

## 2020-12-08 RX ADMIN — TAMSULOSIN HYDROCHLORIDE 0.4 MILLIGRAM(S): 0.4 CAPSULE ORAL at 21:37

## 2020-12-08 RX ADMIN — SENNA PLUS 2 TABLET(S): 8.6 TABLET ORAL at 21:37

## 2020-12-08 RX ADMIN — Medication 3: at 08:03

## 2020-12-08 RX ADMIN — Medication 6: at 11:58

## 2020-12-08 RX ADMIN — Medication 112 MICROGRAM(S): at 06:07

## 2020-12-08 RX ADMIN — LISINOPRIL 40 MILLIGRAM(S): 2.5 TABLET ORAL at 06:07

## 2020-12-08 RX ADMIN — SIMVASTATIN 5 MILLIGRAM(S): 20 TABLET, FILM COATED ORAL at 21:37

## 2020-12-08 RX ADMIN — HEPARIN SODIUM 5000 UNIT(S): 5000 INJECTION INTRAVENOUS; SUBCUTANEOUS at 17:34

## 2020-12-08 RX ADMIN — Medication 20 MILLIGRAM(S): at 06:07

## 2020-12-08 RX ADMIN — Medication 15 GRAM(S): at 11:59

## 2020-12-08 RX ADMIN — Medication 4: at 16:52

## 2020-12-08 RX ADMIN — Medication 100 MILLIGRAM(S): at 06:07

## 2020-12-08 RX ADMIN — INSULIN GLARGINE 10 UNIT(S): 100 INJECTION, SOLUTION SUBCUTANEOUS at 08:04

## 2020-12-08 RX ADMIN — OXYCODONE HYDROCHLORIDE 5 MILLIGRAM(S): 5 TABLET ORAL at 01:38

## 2020-12-08 NOTE — PROGRESS NOTE ADULT - SUBJECTIVE AND OBJECTIVE BOX
Overnight orthopedics was called to evaluate patients left wrist. Patient became confused overnight and took off her left sugar tong splint. Patient this morning more alert and less confused complaining of some left wrist pain but grossly moving all digits. Otherwise her pain is controlled and denies any numbness or tingling.     Vital Signs Last 24 Hrs  T(C): 36.8 (08 Dec 2020 05:20), Max: 36.9 (07 Dec 2020 13:04)  T(F): 98.2 (08 Dec 2020 05:20), Max: 98.4 (07 Dec 2020 13:04)  HR: 98 (08 Dec 2020 08:55) (80 - 98)  BP: 144/81 (08 Dec 2020 08:55) (144/81 - 171/84)  BP(mean): --  RR: 18 (08 Dec 2020 05:20) (18 - 20)  SpO2: 97% (08 Dec 2020 08:55) (94% - 97%)    PE:  NAD, awake and alert.     PE L UE:  SUGAR TONG spint removed and at bedside, skin is intact ttp over distal radius  Moving fingers, cap refill < 2 sec, +AIN/PIN/Axillary/MSC motor. SILT, Compartments soft and compressible.   TTP over the proximal humerus.        A well padded short arm cast was placed on the patients left wrist and the patient tolerated the procedure well. patient was neurovascularly intact after placement    post reduction xrs demonstrated adequate alingment

## 2020-12-08 NOTE — PROGRESS NOTE ADULT - ASSESSMENT
A/P: 85y [facility]al Rad[facility] Fract[facility], L Proximal Humerus Fracture and LC1 pelvic fracture    -Pt reports she ambulates with cane at baseline  - Pain control  - Strict Ice/Elevation of the LUE  - NWB L UE with short arm case and sling  - Keep cast clean/dry/intact;  -WBAT Bilateral LEs   - Encourage active finger motion to help with swelling  - Pt aware of possible need for surgical intervention of distal radius fracture. Pt would like to explore non operative options at this time.   - Pt made aware of signs and symptoms of compartment syndrome.   - No acute orthopedic surgical intervention at this time  - Patient is ortho stable for discharge  - Patient may follow up with Dr. Minor after discharge  -Likley social admit for inability to ambulate and pain control  - All Pt's / Family Members questions answered, Pt/family understand plan.  -Case was discussed with Dr. Minor  -No further ortho intervention at this time  -Ortho stable for discharge. No further intervention planned at this point.   -EDDIE w/ Dr Minor outpatient, call office for appt.   -Will advise if there are changes to the plan.

## 2020-12-08 NOTE — PROGRESS NOTE ADULT - SUBJECTIVE AND OBJECTIVE BOX
CAPILLARY BLOOD GLUCOSE      POCT Blood Glucose.: 262 mg/dL (08 Dec 2020 07:59)  POCT Blood Glucose.: 221 mg/dL (07 Dec 2020 21:06)  POCT Blood Glucose.: 190 mg/dL (07 Dec 2020 16:29)  POCT Blood Glucose.: 226 mg/dL (07 Dec 2020 11:44)      Vital Signs Last 24 Hrs  T(C): 36.8 (08 Dec 2020 05:20), Max: 36.9 (07 Dec 2020 13:04)  T(F): 98.2 (08 Dec 2020 05:20), Max: 98.4 (07 Dec 2020 13:04)  HR: 97 (08 Dec 2020 05:20) (80 - 97)  BP: 168/81 (08 Dec 2020 05:20) (161/72 - 171/84)  BP(mean): --  RR: 18 (08 Dec 2020 05:20) (18 - 20)  SpO2: 97% (08 Dec 2020 05:20) (94% - 97%)    General: WN/WD NAD  Respiratory: CTA B/L  CV: RRR, S1S2, no murmurs, rubs or gallops  Abdominal: Soft, NT, ND +BS, Last BM  Extremities: No edema, + peripheral pulses     12-06    137  |  105  |  20  ----------------------------<  238<H>  4.4   |  22  |  1.10    Ca    9.6      06 Dec 2020 14:29    TPro  7.6  /  Alb  3.5  /  TBili  0.4  /  DBili  x   /  AST  22  /  ALT  23  /  AlkPhos  63  12-06      dextrose 40% Gel 15 Gram(s) Oral once  dextrose 50% Injectable 25 Gram(s) IV Push once  dextrose 50% Injectable 12.5 Gram(s) IV Push once  dextrose 50% Injectable 25 Gram(s) IV Push once  glucagon  Injectable 1 milliGRAM(s) IntraMuscular once  insulin glargine Injectable (LANTUS) 10 Unit(s) SubCutaneous every morning  insulin lispro (ADMELOG) corrective regimen sliding scale   SubCutaneous three times a day before meals  insulin lispro (ADMELOG) corrective regimen sliding scale   SubCutaneous at bedtime  levothyroxine 112 MICROGram(s) Oral daily  simvastatin 5 milliGRAM(s) Oral at bedtime

## 2020-12-08 NOTE — CHART NOTE - NSCHARTNOTEFT_GEN_A_CORE
Called by RN; patient was agitated and pulled off left upper extremity splint and sling. Patient seen and examined at bedside. Patient lying in bed, with splint and sling ripped in half on bed. She is alert and awake. She was slightly confused initially and thought RN was in her house, however, she was easily redirectable and was told that she was in the hospital. When asked why she ripped off her splint, patient states that she "felt like it" and that "she is fine and doesn't need it". She has pain the left arm is palpated, but denies any other complaints.     Patient clearly sundowning and is easily redirectable. However, given her radius and humeral fractures, she will likely need to have the splint re-done   Call placed to ortho, awaiting callback. In meantime, nurse advised to keep arm immobilized and stable. Nursing assistant placed  outside room to watch patient.   Will continue to monitor; RN to call if any changes. Called by RN; patient was agitated and pulled off left upper extremity splint and sling. Patient seen and examined at bedside. Patient lying in bed, with splint ripped in half on bed. She is alert and awake. She was slightly confused initially and thought RN was in her house, however, she was easily redirectable and was told that she was in the hospital. When asked why she ripped off her splint, patient states that she "felt like it" and that "she is fine and doesn't need it". She has pain the left arm is palpated, but denies any other complaints.     Patient clearly sundowning and is easily redirectable. However, given her radius and humeral fractures, she will likely need to have the splint re-done   Call placed to ortho, awaiting callback. In meantime, nurse advised to keep arm immobilized and stable. Nursing assistant placed  outside room to watch patient.   Will continue to monitor; RN to call if any changes.    ADDENDUM 0130  Ortho called back; advised that patient remain in sling to keep arm immobilized until AM when they could come and evaluate and place patient in splint.   Sling placed on LUE.   Will continue to monitor; RN to call if any changes.

## 2020-12-08 NOTE — PROGRESS NOTE ADULT - SUBJECTIVE AND OBJECTIVE BOX
Patient is a 85y old  Female who presents with a chief complaint of Pelvic and left humerus fracture (07 Dec 2020 11:00)      SUBJECTIVE / OVERNIGHT EVENTS: No events over night     T(C): 36.5 (12-08-20 @ 12:23), Max: 36.5 (12-08-20 @ 12:23)  HR: 79 (12-08-20 @ 12:23) (79 - 79)  BP: 142/74 (12-08-20 @ 12:23) (142/74 - 142/74)  RR: 18 (12-08-20 @ 12:23) (18 - 18)  SpO2: 93% (12-08-20 @ 12:23) (93% - 93%)      MEDICATIONS  (STANDING):  dextrose 40% Gel 15 Gram(s) Oral once  dextrose 50% Injectable 25 Gram(s) IV Push once  dextrose 50% Injectable 12.5 Gram(s) IV Push once  dextrose 50% Injectable 25 Gram(s) IV Push once  furosemide    Tablet 20 milliGRAM(s) Oral daily  glucagon  Injectable 1 milliGRAM(s) IntraMuscular once  heparin   Injectable 5000 Unit(s) SubCutaneous every 12 hours  insulin glargine Injectable (LANTUS) 10 Unit(s) SubCutaneous every morning  insulin lispro (ADMELOG) corrective regimen sliding scale   SubCutaneous three times a day before meals  levothyroxine 112 MICROGram(s) Oral daily  lisinopril 40 milliGRAM(s) Oral daily  metoprolol succinate  milliGRAM(s) Oral daily  simvastatin 5 milliGRAM(s) Oral at bedtime  tamsulosin 0.4 milliGRAM(s) Oral at bedtime  urea Oral Powder 15 Gram(s) Oral daily    MEDICATIONS  (PRN):  acetaminophen   Tablet .. 650 milliGRAM(s) Oral every 6 hours PRN Mild Pain (1 - 3)  oxyCODONE    IR 5 milliGRAM(s) Oral four times a day PRN Severe Pain (7 - 10)  traMADol 25 milliGRAM(s) Oral four times a day PRN Moderate Pain (4 - 6)    T(C): 36.8 (12-07-20 @ 21:45), Max: 36.9 (12-07-20 @ 13:04)  HR: 89 (12-07-20 @ 22:26) (80 - 91)  BP: 163/79 (12-07-20 @ 22:26) (161/72 - 171/84)  RR: 18 (12-07-20 @ 21:45) (18 - 20)  SpO2: 95% (12-07-20 @ 21:45) (94% - 95%)    PHYSICAL EXAM:  GENERAL: NAD, well-developed  HEAD:  Atraumatic, Normocephalic  EYES: EOMI, conjunctiva and sclera clear  NECK: Supple, No JVD  CHEST/LUNG: Clear to auscultation bilaterally; No wheeze  HEART: Regular rate and rhythm; No murmurs, rubs, or gallops  ABDOMEN: Soft, Nontender, Nondistended; Bowel sounds present  EXTREMITIES:  2+ Peripheral Pulses, No clubbing, cyanosis, or edema  PSYCH: AAOx3  NEUROLOGY: non-focal  SKIN: No rashes or lesions                             10.5   12.34 )-----------( 259      ( 07 Dec 2020 06:17 )             32.9               CAPILLARY BLOOD GLUCOSE      POCT Blood Glucose.: 216 mg/dL (08 Dec 2020 16:46)  POCT Blood Glucose.: 280 mg/dL (08 Dec 2020 11:10)  POCT Blood Glucose.: 262 mg/dL (08 Dec 2020 07:59)      RADIOLOGY & ADDITIONAL TESTS:    Imaging Personally Reviewed:    Consultant(s) Notes Reviewed:      Care Discussed with Consultants/Other Providers:

## 2020-12-09 DIAGNOSIS — E11.65 TYPE 2 DIABETES MELLITUS WITH HYPERGLYCEMIA: ICD-10-CM

## 2020-12-09 LAB
HCT VFR BLD CALC: 27.8 % — LOW (ref 34.5–45)
HGB BLD-MCNC: 9 G/DL — LOW (ref 11.5–15.5)
MCHC RBC-ENTMCNC: 27.8 PG — SIGNIFICANT CHANGE UP (ref 27–34)
MCHC RBC-ENTMCNC: 32.4 GM/DL — SIGNIFICANT CHANGE UP (ref 32–36)
MCV RBC AUTO: 85.8 FL — SIGNIFICANT CHANGE UP (ref 80–100)
NRBC # BLD: 0 /100 WBCS — SIGNIFICANT CHANGE UP (ref 0–0)
PLATELET # BLD AUTO: 266 K/UL — SIGNIFICANT CHANGE UP (ref 150–400)
RBC # BLD: 3.24 M/UL — LOW (ref 3.8–5.2)
RBC # FLD: 14.2 % — SIGNIFICANT CHANGE UP (ref 10.3–14.5)
SARS-COV-2 RNA SPEC QL NAA+PROBE: SIGNIFICANT CHANGE UP
WBC # BLD: 15.57 K/UL — HIGH (ref 3.8–10.5)
WBC # FLD AUTO: 15.57 K/UL — HIGH (ref 3.8–10.5)

## 2020-12-09 RX ORDER — POLYETHYLENE GLYCOL 3350 17 G/17G
17 POWDER, FOR SOLUTION ORAL
Qty: 0 | Refills: 0 | DISCHARGE
Start: 2020-12-09

## 2020-12-09 RX ORDER — FUROSEMIDE 40 MG
20 TABLET ORAL DAILY
Refills: 0 | Status: DISCONTINUED | OUTPATIENT
Start: 2020-12-09 | End: 2020-12-14

## 2020-12-09 RX ORDER — ACETAMINOPHEN 500 MG
2 TABLET ORAL
Qty: 0 | Refills: 0 | DISCHARGE
Start: 2020-12-09

## 2020-12-09 RX ORDER — HEPARIN SODIUM 5000 [USP'U]/ML
5000 INJECTION INTRAVENOUS; SUBCUTANEOUS EVERY 8 HOURS
Refills: 0 | Status: DISCONTINUED | OUTPATIENT
Start: 2020-12-09 | End: 2020-12-14

## 2020-12-09 RX ORDER — LISINOPRIL 2.5 MG/1
40 TABLET ORAL DAILY
Refills: 0 | Status: DISCONTINUED | OUTPATIENT
Start: 2020-12-09 | End: 2020-12-14

## 2020-12-09 RX ORDER — METOPROLOL TARTRATE 50 MG
100 TABLET ORAL DAILY
Refills: 0 | Status: DISCONTINUED | OUTPATIENT
Start: 2020-12-09 | End: 2020-12-14

## 2020-12-09 RX ORDER — INSULIN LISPRO 100/ML
3 VIAL (ML) SUBCUTANEOUS
Refills: 0 | Status: DISCONTINUED | OUTPATIENT
Start: 2020-12-09 | End: 2020-12-11

## 2020-12-09 RX ORDER — PSYLLIUM SEED (WITH DEXTROSE)
1 POWDER (GRAM) ORAL EVERY 24 HOURS
Refills: 0 | Status: DISCONTINUED | OUTPATIENT
Start: 2020-12-09 | End: 2020-12-14

## 2020-12-09 RX ORDER — HEPARIN SODIUM 5000 [USP'U]/ML
0 INJECTION INTRAVENOUS; SUBCUTANEOUS
Qty: 0 | Refills: 0 | DISCHARGE
Start: 2020-12-09

## 2020-12-09 RX ORDER — SENNA PLUS 8.6 MG/1
2 TABLET ORAL
Qty: 0 | Refills: 0 | DISCHARGE
Start: 2020-12-09

## 2020-12-09 RX ORDER — TRAMADOL HYDROCHLORIDE 50 MG/1
0.5 TABLET ORAL
Qty: 0 | Refills: 0 | DISCHARGE
Start: 2020-12-09

## 2020-12-09 RX ORDER — MAGNESIUM HYDROXIDE 400 MG/1
30 TABLET, CHEWABLE ORAL DAILY
Refills: 0 | Status: DISCONTINUED | OUTPATIENT
Start: 2020-12-09 | End: 2020-12-14

## 2020-12-09 RX ADMIN — TRAMADOL HYDROCHLORIDE 25 MILLIGRAM(S): 50 TABLET ORAL at 23:21

## 2020-12-09 RX ADMIN — POLYETHYLENE GLYCOL 3350 17 GRAM(S): 17 POWDER, FOR SOLUTION ORAL at 11:42

## 2020-12-09 RX ADMIN — INSULIN GLARGINE 10 UNIT(S): 100 INJECTION, SOLUTION SUBCUTANEOUS at 07:34

## 2020-12-09 RX ADMIN — TRAMADOL HYDROCHLORIDE 25 MILLIGRAM(S): 50 TABLET ORAL at 13:10

## 2020-12-09 RX ADMIN — TRAMADOL HYDROCHLORIDE 25 MILLIGRAM(S): 50 TABLET ORAL at 22:21

## 2020-12-09 RX ADMIN — SIMVASTATIN 5 MILLIGRAM(S): 20 TABLET, FILM COATED ORAL at 21:59

## 2020-12-09 RX ADMIN — LISINOPRIL 40 MILLIGRAM(S): 2.5 TABLET ORAL at 05:11

## 2020-12-09 RX ADMIN — Medication 112 MICROGRAM(S): at 05:11

## 2020-12-09 RX ADMIN — SENNA PLUS 2 TABLET(S): 8.6 TABLET ORAL at 21:59

## 2020-12-09 RX ADMIN — HEPARIN SODIUM 5000 UNIT(S): 5000 INJECTION INTRAVENOUS; SUBCUTANEOUS at 21:59

## 2020-12-09 RX ADMIN — HEPARIN SODIUM 5000 UNIT(S): 5000 INJECTION INTRAVENOUS; SUBCUTANEOUS at 17:57

## 2020-12-09 RX ADMIN — TAMSULOSIN HYDROCHLORIDE 0.4 MILLIGRAM(S): 0.4 CAPSULE ORAL at 21:59

## 2020-12-09 RX ADMIN — HEPARIN SODIUM 5000 UNIT(S): 5000 INJECTION INTRAVENOUS; SUBCUTANEOUS at 05:11

## 2020-12-09 RX ADMIN — Medication 2: at 16:56

## 2020-12-09 RX ADMIN — Medication 100 MILLIGRAM(S): at 05:11

## 2020-12-09 RX ADMIN — Medication 6: at 11:41

## 2020-12-09 RX ADMIN — Medication 15 GRAM(S): at 11:42

## 2020-12-09 RX ADMIN — Medication 2: at 07:33

## 2020-12-09 RX ADMIN — TRAMADOL HYDROCHLORIDE 25 MILLIGRAM(S): 50 TABLET ORAL at 12:39

## 2020-12-09 RX ADMIN — Medication 20 MILLIGRAM(S): at 05:11

## 2020-12-09 RX ADMIN — Medication 3 UNIT(S): at 16:56

## 2020-12-09 NOTE — CHART NOTE - NSCHARTNOTEFT_GEN_A_CORE
Called by nursing today, discussion held with daughter about concerns for displacement of proximal humerus fracture due to her increased pain. Repeat shoulder films obtained demonstrating unchanged L proximal humerus fracture from prior imaging this hospital stay with located GH joint. No additional orthopedic concerns regarding L distal radius fracture s/p closed reduction and splinting or LC1 pelvic injury.     Plan  - Pain control  - Strict Ice/Elevation of the LUE  - NWB LUE with short arm case and sling  - Keep cast clean/dry/intact;  - WBAT Bilateral LEs for LC1 Injury  - Encourage active finger motion to help with swelling  - No acute orthopedic surgical intervention at this time  - Ortho stable for discharge  - Will discuss with attending, change plan as needed

## 2020-12-09 NOTE — PROGRESS NOTE ADULT - SUBJECTIVE AND OBJECTIVE BOX
CAPILLARY BLOOD GLUCOSE      POCT Blood Glucose.: 254 mg/dL (09 Dec 2020 11:27)  POCT Blood Glucose.: 192 mg/dL (09 Dec 2020 07:16)  POCT Blood Glucose.: 204 mg/dL (08 Dec 2020 22:02)  POCT Blood Glucose.: 216 mg/dL (08 Dec 2020 16:46)      Vital Signs Last 24 Hrs  T(C): 36.4 (09 Dec 2020 04:49), Max: 36.7 (08 Dec 2020 21:23)  T(F): 97.5 (09 Dec 2020 04:49), Max: 98.1 (08 Dec 2020 21:23)  HR: 91 (09 Dec 2020 04:49) (89 - 91)  BP: 121/74 (09 Dec 2020 04:49) (121/74 - 129/72)  BP(mean): --  RR: 18 (09 Dec 2020 04:49) (18 - 18)  SpO2: 95% (09 Dec 2020 04:49) (95% - 96%)    General: WN/WD NAD  Respiratory: CTA B/L  CV: RRR, S1S2, no murmurs, rubs or gallops  Abdominal: Soft, NT, ND +BS, Last BM  Extremities: No edema, + peripheral pulses     12-09    136  |  x   |  x   ----------------------------<  x   x    |  x   |  0.80      TPro  x   /  Alb  x   /  TBili  0.7  /  DBili  x   /  AST  x   /  ALT  x   /  AlkPhos  x   12-09      dextrose 40% Gel 15 Gram(s) Oral once  dextrose 50% Injectable 25 Gram(s) IV Push once  dextrose 50% Injectable 12.5 Gram(s) IV Push once  dextrose 50% Injectable 25 Gram(s) IV Push once  glucagon  Injectable 1 milliGRAM(s) IntraMuscular once  insulin glargine Injectable (LANTUS) 10 Unit(s) SubCutaneous every morning  insulin lispro (ADMELOG) corrective regimen sliding scale   SubCutaneous three times a day before meals  levothyroxine 112 MICROGram(s) Oral daily  simvastatin 5 milliGRAM(s) Oral at bedtime

## 2020-12-09 NOTE — PROGRESS NOTE ADULT - SUBJECTIVE AND OBJECTIVE BOX
Patient is a 85y old  Female who presents with a chief complaint of Pelvic and left humerus fracture (09 Dec 2020 12:46)      INTERVAL /OVERNIGHT EVENTS: c/o constipation    MEDICATIONS  (STANDING):  bisacodyl Suppository 10 milliGRAM(s) Rectal once  dextrose 40% Gel 15 Gram(s) Oral once  dextrose 50% Injectable 25 Gram(s) IV Push once  dextrose 50% Injectable 12.5 Gram(s) IV Push once  dextrose 50% Injectable 25 Gram(s) IV Push once  furosemide    Tablet 20 milliGRAM(s) Oral daily  glucagon  Injectable 1 milliGRAM(s) IntraMuscular once  heparin   Injectable 5000 Unit(s) SubCutaneous every 12 hours  insulin glargine Injectable (LANTUS) 10 Unit(s) SubCutaneous every morning  insulin lispro (ADMELOG) corrective regimen sliding scale   SubCutaneous three times a day before meals  insulin lispro Injectable (ADMELOG) 3 Unit(s) SubCutaneous three times a day before meals  levothyroxine 112 MICROGram(s) Oral daily  lisinopril 40 milliGRAM(s) Oral daily  metoprolol succinate  milliGRAM(s) Oral daily  polyethylene glycol 3350 17 Gram(s) Oral daily  senna 2 Tablet(s) Oral at bedtime  simvastatin 5 milliGRAM(s) Oral at bedtime  tamsulosin 0.4 milliGRAM(s) Oral at bedtime  urea Oral Powder 15 Gram(s) Oral daily    MEDICATIONS  (PRN):  acetaminophen   Tablet .. 650 milliGRAM(s) Oral every 6 hours PRN Mild Pain (1 - 3)  traMADol 25 milliGRAM(s) Oral four times a day PRN Moderate Pain (4 - 6)      Allergies    penicillin (Muscle Pain)    Intolerances        REVIEW OF SYSTEMS:  CONSTITUTIONAL: No fever, weight loss, or fatigue  EYES: No eye pain, visual disturbances, or discharge  ENMT:  No difficulty hearing, tinnitus, vertigo; No sinus or throat pain  NECK: No pain or stiffness  RESPIRATORY: No cough, wheezing, chills or hemoptysis; No shortness of breath  CARDIOVASCULAR: No chest pain, palpitations, dizziness, or leg swelling  GASTROINTESTINAL: No abdominal or epigastric pain. No nausea, vomiting, or hematemesis; +constipation. No melena or hematochezia.  GENITOURINARY: No dysuria, frequency, hematuria, or incontinence  NEUROLOGICAL: No headaches, memory loss, loss of strength, numbness, or tremors  SKIN: No itching, burning, rashes, or lesions   LYMPH NODES: No enlarged glands  ENDOCRINE: No heat or cold intolerance; No hair loss; No polydipsia or polyuria  MUSCULOSKELETAL: No joint pain or swelling; No muscle, back, or extremity pain  PSYCHIATRIC: No depression, anxiety, mood swings, or difficulty sleeping  HEME/LYMPH: No easy bruising, or bleeding gums  ALLERGY AND IMMUNOLOGIC: No hives or eczema    Vital Signs Last 24 Hrs  T(C): 36.3 (09 Dec 2020 12:55), Max: 36.7 (08 Dec 2020 21:23)  T(F): 97.4 (09 Dec 2020 12:55), Max: 98.1 (08 Dec 2020 21:23)  HR: 104 (09 Dec 2020 12:55) (89 - 104)  BP: 114/67 (09 Dec 2020 12:55) (114/67 - 129/72)  BP(mean): --  RR: 18 (09 Dec 2020 12:55) (18 - 18)  SpO2: 94% (09 Dec 2020 12:55) (94% - 96%)    PHYSICAL EXAM:  GENERAL: NAD, well-groomed, well-developed  HEAD:  Atraumatic, Normocephalic  EYES: EOMI, PERRLA, conjunctiva and sclera clear  ENMT: No tonsillar erythema, exudates, or enlargement; Moist mucous membranes, Good dentition, No lesions  NECK: Supple, No JVD, Normal thyroid  NERVOUS SYSTEM:  Alert & Oriented X3, Good concentration; Motor Strength 5/5 B/L upper and lower extremities; DTRs 2+ intact and symmetric  CHEST/LUNG: Clear to auscultation bilaterally; No rales, rhonchi, wheezing, or rubs  HEART: Regular rate and rhythm; No murmurs, rubs, or gallops  ABDOMEN: Soft, Nontender, Nondistended; Bowel sounds present  EXTREMITIES:  2+ Peripheral Pulses, No clubbing, cyanosis, or edema  LYMPH: No lymphadenopathy noted  SKIN: No rashes or lesions    LABS:    09 Dec 2020 10:01    136    |  x      |  x      ----------------------------<  x      x       |  x      |  0.80       TPro  x      /  Alb  x      /  TBili  0.7    /  DBili  x      /  AST  x      /  ALT  x      /  AlkPhos  x      09 Dec 2020 10:01    PT/INR - ( 09 Dec 2020 10:01 )   PT: 13.0 sec;   INR: 1.12 ratio             CAPILLARY BLOOD GLUCOSE      POCT Blood Glucose.: 192 mg/dL (09 Dec 2020 16:28)  POCT Blood Glucose.: 254 mg/dL (09 Dec 2020 11:27)  POCT Blood Glucose.: 192 mg/dL (09 Dec 2020 07:16)  POCT Blood Glucose.: 204 mg/dL (08 Dec 2020 22:02)      RADIOLOGY & ADDITIONAL TESTS:    Notes Reviewed:  [x ] YES  [ ] NO    Care Discussed with Consultants/Other Providers [ x] YES  [ ] NO

## 2020-12-10 ENCOUNTER — TRANSCRIPTION ENCOUNTER (OUTPATIENT)
Age: 85
End: 2020-12-10

## 2020-12-10 DIAGNOSIS — R41.82 ALTERED MENTAL STATUS, UNSPECIFIED: ICD-10-CM

## 2020-12-10 LAB
HCT VFR BLD CALC: 31 % — LOW (ref 34.5–45)
HGB BLD-MCNC: 10.2 G/DL — LOW (ref 11.5–15.5)
MCHC RBC-ENTMCNC: 28.1 PG — SIGNIFICANT CHANGE UP (ref 27–34)
MCHC RBC-ENTMCNC: 32.9 GM/DL — SIGNIFICANT CHANGE UP (ref 32–36)
MCV RBC AUTO: 85.4 FL — SIGNIFICANT CHANGE UP (ref 80–100)
NRBC # BLD: 0 /100 WBCS — SIGNIFICANT CHANGE UP (ref 0–0)
PLATELET # BLD AUTO: 334 K/UL — SIGNIFICANT CHANGE UP (ref 150–400)
RBC # BLD: 3.63 M/UL — LOW (ref 3.8–5.2)
RBC # FLD: 14.4 % — SIGNIFICANT CHANGE UP (ref 10.3–14.5)
WBC # BLD: 15.09 K/UL — HIGH (ref 3.8–10.5)
WBC # FLD AUTO: 15.09 K/UL — HIGH (ref 3.8–10.5)

## 2020-12-10 PROCEDURE — 73030 X-RAY EXAM OF SHOULDER: CPT | Mod: 26,LT

## 2020-12-10 RX ORDER — PSYLLIUM SEED (WITH DEXTROSE)
0 POWDER (GRAM) ORAL
Qty: 0 | Refills: 0 | DISCHARGE
Start: 2020-12-10

## 2020-12-10 RX ORDER — MAGNESIUM HYDROXIDE 400 MG/1
30 TABLET, CHEWABLE ORAL
Qty: 0 | Refills: 0 | DISCHARGE
Start: 2020-12-10

## 2020-12-10 RX ORDER — CIPROFLOXACIN LACTATE 400MG/40ML
500 VIAL (ML) INTRAVENOUS EVERY 12 HOURS
Refills: 0 | Status: DISCONTINUED | OUTPATIENT
Start: 2020-12-10 | End: 2020-12-14

## 2020-12-10 RX ORDER — HEPARIN SODIUM 5000 [USP'U]/ML
0 INJECTION INTRAVENOUS; SUBCUTANEOUS
Qty: 0 | Refills: 0 | DISCHARGE
Start: 2020-12-10

## 2020-12-10 RX ADMIN — HEPARIN SODIUM 5000 UNIT(S): 5000 INJECTION INTRAVENOUS; SUBCUTANEOUS at 14:28

## 2020-12-10 RX ADMIN — Medication 1 PACKET(S): at 05:14

## 2020-12-10 RX ADMIN — HEPARIN SODIUM 5000 UNIT(S): 5000 INJECTION INTRAVENOUS; SUBCUTANEOUS at 05:15

## 2020-12-10 RX ADMIN — Medication 100 MILLIGRAM(S): at 05:14

## 2020-12-10 RX ADMIN — LISINOPRIL 40 MILLIGRAM(S): 2.5 TABLET ORAL at 05:14

## 2020-12-10 RX ADMIN — INSULIN GLARGINE 10 UNIT(S): 100 INJECTION, SOLUTION SUBCUTANEOUS at 07:50

## 2020-12-10 RX ADMIN — HEPARIN SODIUM 5000 UNIT(S): 5000 INJECTION INTRAVENOUS; SUBCUTANEOUS at 21:51

## 2020-12-10 RX ADMIN — SIMVASTATIN 5 MILLIGRAM(S): 20 TABLET, FILM COATED ORAL at 21:51

## 2020-12-10 RX ADMIN — Medication 5 MILLIGRAM(S): at 17:02

## 2020-12-10 RX ADMIN — Medication 6: at 11:48

## 2020-12-10 RX ADMIN — Medication 3 UNIT(S): at 11:26

## 2020-12-10 RX ADMIN — Medication 500 MILLIGRAM(S): at 15:28

## 2020-12-10 RX ADMIN — Medication 112 MICROGRAM(S): at 05:14

## 2020-12-10 RX ADMIN — Medication 4: at 07:51

## 2020-12-10 RX ADMIN — Medication 2: at 16:26

## 2020-12-10 RX ADMIN — Medication 20 MILLIGRAM(S): at 05:14

## 2020-12-10 RX ADMIN — SENNA PLUS 2 TABLET(S): 8.6 TABLET ORAL at 21:51

## 2020-12-10 RX ADMIN — Medication 500 MILLIGRAM(S): at 17:03

## 2020-12-10 RX ADMIN — TRAMADOL HYDROCHLORIDE 25 MILLIGRAM(S): 50 TABLET ORAL at 10:38

## 2020-12-10 RX ADMIN — TAMSULOSIN HYDROCHLORIDE 0.4 MILLIGRAM(S): 0.4 CAPSULE ORAL at 21:51

## 2020-12-10 NOTE — CONSULT NOTE ADULT - SUBJECTIVE AND OBJECTIVE BOX
HPI:  GINGER BRISENO is an 85 y female presented after mechanical fall in her living room landing on her left arm, states no head injury, no LOC, no neck or back pain, complains of left shoulder and arm pain with sig swelling and deformity of left shoulder, left groin pain, no hip pain, no chest pain, sob,  dizziness, prior to fall, daughter at bedside.  No loss of consciousness   (06 Dec 2020 19:33)      PAST MEDICAL & SURGICAL HISTORY:  Primary hyperparathyroidism    HTN (hypertension)    Diabetes mellitus    Hyperlipidemia    Hypothyroid    H/O fracture of humerus    S/P cholecystectomy         SOCIAL HISTORY:                                     Admitted from:  HOME         FAMILY HISTORY:  No pertinent family history in first degree relatives        MEDICATIONS  (STANDING):  bisacodyl Suppository 10 milliGRAM(s) Rectal once  ciprofloxacin     Tablet 500 milliGRAM(s) Oral every 12 hours  dextrose 40% Gel 15 Gram(s) Oral once  dextrose 50% Injectable 25 Gram(s) IV Push once  dextrose 50% Injectable 12.5 Gram(s) IV Push once  dextrose 50% Injectable 25 Gram(s) IV Push once  furosemide    Tablet 20 milliGRAM(s) Oral daily  glucagon  Injectable 1 milliGRAM(s) IntraMuscular once  heparin   Injectable 5000 Unit(s) SubCutaneous every 8 hours  insulin glargine Injectable (LANTUS) 10 Unit(s) SubCutaneous every morning  insulin lispro (ADMELOG) corrective regimen sliding scale   SubCutaneous three times a day before meals  insulin lispro Injectable (ADMELOG) 3 Unit(s) SubCutaneous three times a day before meals  levothyroxine 112 MICROGram(s) Oral daily  lisinopril 40 milliGRAM(s) Oral daily  metoprolol succinate  milliGRAM(s) Oral daily  polyethylene glycol 3350 17 Gram(s) Oral daily  psyllium Powder 1 Packet(s) Oral every 24 hours  senna 2 Tablet(s) Oral at bedtime  simvastatin 5 milliGRAM(s) Oral at bedtime  tamsulosin 0.4 milliGRAM(s) Oral at bedtime  urea Oral Powder 15 Gram(s) Oral daily    MEDICATIONS  (PRN):  acetaminophen   Tablet .. 650 milliGRAM(s) Oral every 6 hours PRN Mild Pain (1 - 3)  bisacodyl 5 milliGRAM(s) Oral every 12 hours PRN Constipation  magnesium hydroxide Suspension 30 milliLiter(s) Oral daily PRN Constipation  traMADol 25 milliGRAM(s) Oral four times a day PRN Moderate Pain (4 - 6)      Allergies    penicillin (Muscle Pain)    Intolerances          ADVANCE DIRECTIVES:    none     Surrogate/HCP/Guardian:  daughter Marleny       Phone#:      Baseline ADLs (prior to admission):  Independent [ ] moderately [ x] fully   Dependent   [ ] moderately [ ]fully    Palliative Performance Status Version 2:       PPS Level -- 80%  Ambulation -- Full  Activity & Evidence of Disease -- Normal activity with effort; Some evidence of disease  Self-Care -- Full  Intake -- Normal   Conscious Level -- Full      Review of Systems: Reviewed  All others negative    Dyspnea: n  Nausea/Vomiting: n   Anxiety:  n  Depression: n  Fatigue: n  Loss of appetite: n    Pain: none            Character-            Duration-            Factors-            Frequency-            Location-            Severity-    Vital Signs Last 24 Hrs  T(C): 36.8 (10 Dec 2020 12:18), Max: 37.3 (09 Dec 2020 22:03)  T(F): 98.2 (10 Dec 2020 12:18), Max: 99.2 (09 Dec 2020 22:03)  HR: 102 (10 Dec 2020 12:18) (95 - 114)  BP: 123/72 (10 Dec 2020 12:18) (123/72 - 169/77)  BP(mean): --  RR: 18 (10 Dec 2020 12:18) (18 - 18)  SpO2: 96% (10 Dec 2020 12:18) (95% - 98%)      General: alert  oriented x _4___                   HEENT: NC/AT                   Lungs: CTA B/L                    CV: S1S2 RRR     GI:  soft,  NT,  BS+                    MSK:  ambulatory                         LABS:                        10.2   15.09 )-----------( 334      ( 10 Dec 2020 14:28 )             31.0     12-09    136  |  x   |  x   ----------------------------<  x   x    |  x   |  0.80      TPro  x   /  Alb  x   /  TBili  0.7  /  DBili  x   /  AST  x   /  ALT  x   /  AlkPhos  x   12-09    PT/INR - ( 09 Dec 2020 10:01 )   PT: 13.0 sec;   INR: 1.12 ratio             I&O's Summary    09 Dec 2020 07:01  -  10 Dec 2020 07:00  --------------------------------------------------------  IN: 0 mL / OUT: 2000 mL / NET: -2000 mL        RADIOLOGY & ADDITIONAL STUDIES:      PSYCHOSOCIAL-SPIRITUAL ASSESSMENT:    __x_Reviewed     x___Care  plan unchanged     ___Care plan adjusted as below    GOALS OF CARE DISCUSSION  	_x__Palliative care info/counseling provided	    ___Family meeting  	_x__Advanced Directives addressed	    ___See previous Palliative Medicine Note    AGENCY CHOICE DISCUSSED:   ___HOSPICE     ___OTHER:              > 50% OF THE TIME SPENT IN COUNSELING AND COORDINATING CARE 	   Start:               End:  	       Minutes:      PROLONGED SERVICE             FACE TO FACE:    PT            PT & FAMILY	   Start:               End:  	       Minutes:      Advance Care Planning Time:

## 2020-12-10 NOTE — CONSULT NOTE ADULT - ASSESSMENT
12.10.2020 This is an 85 F who came in s/p fall. Patient is independent of all ADLs and IADLs. She lives alone. She doesn't drive. Her daughter is her HCP. Patient is planning to go to acute rehab from here.

## 2020-12-10 NOTE — PROGRESS NOTE ADULT - SUBJECTIVE AND OBJECTIVE BOX
Patient is a 85y old  Female who presents with a chief complaint of Pelvic and left humerus fracture (10 Dec 2020 14:29)      INTERVAL /OVERNIGHT EVENTS: alert awake confused    MEDICATIONS  (STANDING):  bisacodyl Suppository 10 milliGRAM(s) Rectal once  ciprofloxacin     Tablet 500 milliGRAM(s) Oral every 12 hours  dextrose 40% Gel 15 Gram(s) Oral once  dextrose 50% Injectable 25 Gram(s) IV Push once  dextrose 50% Injectable 12.5 Gram(s) IV Push once  dextrose 50% Injectable 25 Gram(s) IV Push once  furosemide    Tablet 20 milliGRAM(s) Oral daily  glucagon  Injectable 1 milliGRAM(s) IntraMuscular once  heparin   Injectable 5000 Unit(s) SubCutaneous every 8 hours  insulin glargine Injectable (LANTUS) 10 Unit(s) SubCutaneous every morning  insulin lispro (ADMELOG) corrective regimen sliding scale   SubCutaneous three times a day before meals  insulin lispro Injectable (ADMELOG) 3 Unit(s) SubCutaneous three times a day before meals  levothyroxine 112 MICROGram(s) Oral daily  lisinopril 40 milliGRAM(s) Oral daily  metoprolol succinate  milliGRAM(s) Oral daily  polyethylene glycol 3350 17 Gram(s) Oral daily  psyllium Powder 1 Packet(s) Oral every 24 hours  senna 2 Tablet(s) Oral at bedtime  simvastatin 5 milliGRAM(s) Oral at bedtime  tamsulosin 0.4 milliGRAM(s) Oral at bedtime  urea Oral Powder 15 Gram(s) Oral daily    MEDICATIONS  (PRN):  acetaminophen   Tablet .. 650 milliGRAM(s) Oral every 6 hours PRN Mild Pain (1 - 3)  bisacodyl 5 milliGRAM(s) Oral every 12 hours PRN Constipation  magnesium hydroxide Suspension 30 milliLiter(s) Oral daily PRN Constipation  traMADol 25 milliGRAM(s) Oral four times a day PRN Moderate Pain (4 - 6)      Allergies    penicillin (Muscle Pain)    Intolerances        REVIEW OF SYSTEMS:  denies    Vital Signs Last 24 Hrs  T(C): 36.8 (10 Dec 2020 12:18), Max: 37.3 (09 Dec 2020 22:03)  T(F): 98.2 (10 Dec 2020 12:18), Max: 99.2 (09 Dec 2020 22:03)  HR: 102 (10 Dec 2020 12:18) (95 - 114)  BP: 123/72 (10 Dec 2020 12:18) (123/72 - 169/77)  BP(mean): --  RR: 18 (10 Dec 2020 12:18) (18 - 18)  SpO2: 96% (10 Dec 2020 12:18) (95% - 98%)    PHYSICAL EXAM:  GENERAL: NAD, well-groomed, well-developed  HEAD:  Atraumatic, Normocephalic  EYES: EOMI, PERRLA, conjunctiva and sclera clear  ENMT: No tonsillar erythema, exudates, or enlargement; Moist mucous membranes, Good dentition, No lesions  NECK: Supple, No JVD, Normal thyroid  NERVOUS SYSTEM:  Alert & awake; Motor Strength 5/5 B/L upper and lower extremities; DTRs 2+ intact and symmetric  CHEST/LUNG: Clear to auscultation bilaterally; No rales, rhonchi, wheezing, or rubs  HEART: Regular rate and rhythm; No murmurs, rubs, or gallops  ABDOMEN: Soft, Nontender, Nondistended; Bowel sounds present  EXTREMITIES:  2+ Peripheral Pulses, No clubbing, cyanosis, or edema  LYMPH: No lymphadenopathy noted  SKIN: No rashes or lesions    LABS:                        10.2   15.09 )-----------( 334      ( 10 Dec 2020 14:28 )             31.0           PT/INR - ( 09 Dec 2020 10:01 )   PT: 13.0 sec;   INR: 1.12 ratio             CAPILLARY BLOOD GLUCOSE      POCT Blood Glucose.: 154 mg/dL (10 Dec 2020 16:04)  POCT Blood Glucose.: 271 mg/dL (10 Dec 2020 11:32)  POCT Blood Glucose.: 225 mg/dL (10 Dec 2020 07:41)  POCT Blood Glucose.: 168 mg/dL (09 Dec 2020 22:14)      RADIOLOGY & ADDITIONAL TESTS:    Notes Reviewed:  [x ] YES  [ ] NO    Care Discussed with Consultants/Other Providers [x ] YES  [ ] NO

## 2020-12-10 NOTE — PROGRESS NOTE ADULT - SUBJECTIVE AND OBJECTIVE BOX
CAPILLARY BLOOD GLUCOSE      POCT Blood Glucose.: 271 mg/dL (10 Dec 2020 11:32)  POCT Blood Glucose.: 225 mg/dL (10 Dec 2020 07:41)  POCT Blood Glucose.: 168 mg/dL (09 Dec 2020 22:14)  POCT Blood Glucose.: 192 mg/dL (09 Dec 2020 16:28)      Vital Signs Last 24 Hrs  T(C): 37.1 (10 Dec 2020 04:56), Max: 37.3 (09 Dec 2020 22:03)  T(F): 98.7 (10 Dec 2020 04:56), Max: 99.2 (09 Dec 2020 22:03)  HR: 97 (10 Dec 2020 08:56) (95 - 114)  BP: 153/80 (10 Dec 2020 08:45) (114/67 - 169/77)  BP(mean): --  RR: 18 (10 Dec 2020 04:56) (18 - 18)  SpO2: 98% (10 Dec 2020 08:56) (94% - 98%)    General: WN/WD NAD  Respiratory: CTA B/L  CV: RRR, S1S2, no murmurs, rubs or gallops  Abdominal: Soft, NT, ND +BS, Last BM  Extremities: No edema, + peripheral pulses     12-09    136  |  x   |  x   ----------------------------<  x   x    |  x   |  0.80      TPro  x   /  Alb  x   /  TBili  0.7  /  DBili  x   /  AST  x   /  ALT  x   /  AlkPhos  x   12-09      dextrose 40% Gel 15 Gram(s) Oral once  dextrose 50% Injectable 25 Gram(s) IV Push once  dextrose 50% Injectable 12.5 Gram(s) IV Push once  dextrose 50% Injectable 25 Gram(s) IV Push once  glucagon  Injectable 1 milliGRAM(s) IntraMuscular once  insulin glargine Injectable (LANTUS) 10 Unit(s) SubCutaneous every morning  insulin lispro (ADMELOG) corrective regimen sliding scale   SubCutaneous three times a day before meals  insulin lispro Injectable (ADMELOG) 3 Unit(s) SubCutaneous three times a day before meals  levothyroxine 112 MICROGram(s) Oral daily  simvastatin 5 milliGRAM(s) Oral at bedtime

## 2020-12-10 NOTE — DISCHARGE NOTE PROVIDER - NSDCCPCAREPLAN_GEN_ALL_CORE_FT
PRINCIPAL DISCHARGE DIAGNOSIS  Diagnosis: Humerus fracture  Assessment and Plan of Treatment: follow up with ortho Dr. elliott      SECONDARY DISCHARGE DIAGNOSES  Diagnosis: Sacral fracture, closed  Assessment and Plan of Treatment:     Diagnosis: Fall at home, initial encounter  Assessment and Plan of Treatment:     Diagnosis: Pubic ramus fracture  Assessment and Plan of Treatment:

## 2020-12-10 NOTE — PROGRESS NOTE ADULT - ASSESSMENT
Patient is a 85 year old female with PMH of HTN, DM, HLD, and primary hyperparathyroidism who presented after having a mechanical fall in her living room. Patient admitted with left distal radius fracture, left proximal humerus fracture and LC1 pelvic fracture. Patient also noted to have a positive urinalysis and was started on ertapenem due to penicillin allergy.   Patient has been noted with some confusion and has leukocytosis, concern for possible E. coli UTI.

## 2020-12-10 NOTE — DISCHARGE NOTE PROVIDER - CARE PROVIDERS DIRECT ADDRESSES
,[email or web address] ,[email or web address],DirectAddress_Unknown,[email or web address]

## 2020-12-10 NOTE — DISCHARGE NOTE PROVIDER - NSDCMRMEDTOKEN_GEN_ALL_CORE_FT
acetaminophen 325 mg oral tablet: 2 tab(s) orally every 6 hours, As needed, Mild Pain (1 - 3)  bisacodyl 5 mg oral delayed release tablet: 1 tab(s) orally every 12 hours, As needed, Constipation  fosinopril 20 mg oral tablet: 1 tab(s) orally once a day  furosemide 20 mg oral tablet: 1 tab(s) orally once a day  heparin 10,000 units/mL injectable solution:  injectable every 12 hours  heparin 5000 units/mL injectable solution:  injectable every 12 hours  Levemir FlexPen 100 units/mL subcutaneous solution: 10 unit(s) subcutaneous once a day  levothyroxine 112 mcg (0.112 mg) oral tablet: 1 tab(s) orally once a day  magnesium hydroxide 8% oral suspension: 30 milliliter(s) orally once a day, As needed, Constipation  metoprolol succinate 25 mg oral tablet, extended release: 3 tab(s) orally once a day  pioglitazone-metformin 15 mg-500 mg oral tablet: 1 tab(s) orally 3 times a day   polyethylene glycol 3350 oral powder for reconstitution: 17 gram(s) orally once a day  psyllium 3.4 g/7 g oral powder for reconstitution: orally once a day  senna oral tablet: 2 tab(s) orally once a day (at bedtime)  simvastatin 5 mg oral tablet: 1 tab(s) orally once a day (at bedtime)  traMADol 50 mg oral tablet: 0.5 tab(s) orally 4 times a day, As needed, Moderate Pain (4 - 6)  ure-Na 15 g oral powder for reconstitution: 1 packet(s) orally 2 times a day    acetaminophen 325 mg oral tablet: 2 tab(s) orally every 6 hours, As needed, Mild Pain (1 - 3)  bisacodyl 5 mg oral delayed release tablet: 1 tab(s) orally every 12 hours, As needed, Constipation  ciprofloxacin 500 mg oral tablet: 1 tab(s) orally every 12 hours for 3 days  fosinopril 20 mg oral tablet: 1 tab(s) orally once a day  furosemide 20 mg oral tablet: 1 tab(s) orally once a day  heparin 5000 units/mL injectable solution:  injectable every 12 hours  Levemir FlexPen 100 units/mL subcutaneous solution: 10 unit(s) subcutaneous once a day  levothyroxine 112 mcg (0.112 mg) oral tablet: 1 tab(s) orally once a day  magnesium hydroxide 8% oral suspension: 30 milliliter(s) orally once a day, As needed, Constipation  metoprolol succinate 25 mg oral tablet, extended release: 3 tab(s) orally once a day  oxyCODONE 5 mg oral tablet: 1 tab(s) orally 4 times a day, As needed, Severe Pain (7 - 10)  pioglitazone-metformin 15 mg-500 mg oral tablet: 1 tab(s) orally 3 times a day   polyethylene glycol 3350 oral powder for reconstitution: 17 gram(s) orally once a day  psyllium 3.4 g/7 g oral powder for reconstitution: orally once a day  senna oral tablet: 2 tab(s) orally once a day (at bedtime)  simvastatin 5 mg oral tablet: 1 tab(s) orally once a day (at bedtime)  traMADol 50 mg oral tablet: 0.5 tab(s) orally 4 times a day, As needed, Moderate Pain (4 - 6)  ure-Na 15 g oral powder for reconstitution: 1 packet(s) orally 2 times a day

## 2020-12-10 NOTE — PROGRESS NOTE ADULT - SUBJECTIVE AND OBJECTIVE BOX
[facility], Division of Infectious Diseases  MATILDE Cavanaugh Y. Patel, S. Shah  [phone]  ([phone] - weekdays after 5pm and weekends)    Name: GINGER BRISENO  Age/Gender: 85y Female  MRN: [id number]    Interval History:  Patient sitting up in chair, reports she feels well with no complaints.  She denies fever, chills, chest pain, dyspnea, cough, abd pain, n/v/d, dysuria, frequency, urgency, back pain.  ROS reviewed, pertinent positives and negatives as above. Notes reviewed. Afebrile.   Patient has been noted to have periods of confusion. D/w RN, has been ok, seemed confused earlier, has been talking to neighbor who is confused.     Allergies: penicillin (Muscle Pain)    Objective:  Vitals:   T(F): 98.2 (12-10-20 @ 12:18), Max: 99.2 (12-09-20 @ 22:03)  HR: 102 (12-10-20 @ 12:18) (95 - 114)  BP: 123/72 (12-10-20 @ 12:18) (123/72 - 169/77)  RR: 18 (12-10-20 @ 12:18) (18 - 18)  SpO2: 96% (12-10-20 @ 12:18) (95% - 98%)    Physical Examination:  General: no acute distress, nontoxic appearing  HEENT: NC/AT, EOMI, anicteric, neck supple  Cardio: S1, S2 heard, RRR, no murmurs  Resp: CTA bilaterally, no rales/wheezes/rhonchi  Abd: soft, NT, ND, + BS  Neuro: AAOx3, no obvious focal deficits  Ext: LUE in cast, no edema or cyanosis, moving extremities  Skin: warm, dry, no visible rash  Psych: appropriate affect and mood for situation  Lines: no PIV, does not want another placed    Laboratory Studies:  CBC:                       9.0    15.57 )-----------( 266      ( 09 Dec 2020 21:41 )             27.8     CMP: 12-09    136  |  x   |  x   ----------------------------<  x   x    |  x   |  0.80      TPro  x   /  Alb  x   /  TBili  0.7  /  DBili  x   /  AST  x   /  ALT  x   /  AlkPhos  x   12-09    Microbiology:  12/9 - COVID-19 PCR - negative  12/7 - urine culture - E.coli, pansensitive  -  Amikacin: S <=16      -  Amoxicillin/Clavulanic Acid: S <=8/4      -  Ampicillin: S <=8 These ampicillin results predict results for amoxicillin      -  Ampicillin/Sulbactam: S <=4/2 Enterobacter, Citrobacter, and Serratia may develop resistance during prolonged therapy (3-4 days)      -  Aztreonam: S <=4      -  Cefazolin: S <=2 (MIC_CL_COM_ENTERIC_CEFAZU) For uncomplicated UTI with K. pneumoniae, E. coli, or P. mirablis: ASHLEY <=16 is sensitive and ASHLEY >=32 is resistant. This also predicts results for oral agents cefaclor, cefdinir, cefpodoxime, cefprozil, cefuroxime axetil, cephalexin and locarbef for uncomplicated UTI. Note that some isolates may be susceptible to these agents while testing resistant to cefazolin.      -  Cefepime: S <=2      -  Cefoxitin: S <=8      -  Ceftriaxone: S <=1 Enterobacter, Citrobacter, and Serratia may develop resistance during prolonged therapy      -  Ciprofloxacin: S <=0.25      -  Ertapenem: S <=0.5      -  Gentamicin: S <=2      -  Imipenem: S <=1      -  Levofloxacin: S <=0.5      -  Meropenem: S <=1      -  Nitrofurantoin: S <=32 Should not be used to treat pyelonephritis      -  Piperacillin/Tazobactam: S <=8      -  Tigecycline: S <=2      -  Tobramycin: S <=2      -  Trimethoprim/Sulfamethoxazole: S <=0.5/9.5  12/7 - COVID-19 ab - negative  12/6 - COVID-19 PCR - negative        Radiology:  Xray Shoulder 2 Views, Left (12.10.20 @ 09:40) > IMPRESSION: Acute fracture of the left humeral neck without further angulation.  Xray Wrist Post Reduction AP/Lat, Left (12.08.20 @ 08:25) > IMPRESSION: AP and lateral radiographs of the left wrist were obtained in a cast. Casting partially obscures osseous detail. A transverse distal radial neck fracture is partially visualized and is adequately aligned. There is a dorsal comminuted fragments. Previously seen ulnar styloid fracture is not well visualized.    Medications:  acetaminophen   Tablet .. 650 milliGRAM(s) Oral every 6 hours PRN  bisacodyl 5 milliGRAM(s) Oral every 12 hours PRN  bisacodyl Suppository 10 milliGRAM(s) Rectal once  dextrose 40% Gel 15 Gram(s) Oral once  dextrose 50% Injectable 25 Gram(s) IV Push once  dextrose 50% Injectable 12.5 Gram(s) IV Push once  dextrose 50% Injectable 25 Gram(s) IV Push once  furosemide    Tablet 20 milliGRAM(s) Oral daily  glucagon  Injectable 1 milliGRAM(s) IntraMuscular once  heparin   Injectable 5000 Unit(s) SubCutaneous every 8 hours  insulin glargine Injectable (LANTUS) 10 Unit(s) SubCutaneous every morning  insulin lispro (ADMELOG) corrective regimen sliding scale   SubCutaneous three times a day before meals  insulin lispro Injectable (ADMELOG) 3 Unit(s) SubCutaneous three times a day before meals  levothyroxine 112 MICROGram(s) Oral daily  lisinopril 40 milliGRAM(s) Oral daily  magnesium hydroxide Suspension 30 milliLiter(s) Oral daily PRN  metoprolol succinate  milliGRAM(s) Oral daily  polyethylene glycol 3350 17 Gram(s) Oral daily  psyllium Powder 1 Packet(s) Oral every 24 hours  senna 2 Tablet(s) Oral at bedtime  simvastatin 5 milliGRAM(s) Oral at bedtime  tamsulosin 0.4 milliGRAM(s) Oral at bedtime  traMADol 25 milliGRAM(s) Oral four times a day PRN  urea Oral Powder 15 Gram(s) Oral daily    Antibiotics:  s/p ertapenem 12/6

## 2020-12-10 NOTE — DISCHARGE NOTE PROVIDER - PROVIDER TOKENS
PROVIDER:[TOKEN:[[id number]]],PROVIDER:[TOKEN:[[id number]]] PROVIDER:[TOKEN:[[id number]]],PROVIDER:[TOKEN:[[id number]]],PROVIDER:[TOKEN:[[id number]]]

## 2020-12-10 NOTE — DISCHARGE NOTE PROVIDER - CARE PROVIDER_API CALL
Guilherme Calabrese)  Internal Medicine  [address] [place] [address]  Phone: [phone]  Fax: [phone]  Follow Up Time:     Alec Yang  ORTHOPAEDIC SURGERY  [address] [place] [address]  Phone: [phone]  Fax: [phone]  Follow Up Time:    Guilherme Calabrese)  Internal Medicine  [address] [place] [address]  Phone: [phone]  Fax: [phone]  Follow Up Time:     Alec Yang  ORTHOPAEDIC SURGERY  [address] [place] [address]  Phone: [phone]  Fax: [phone]  Follow Up Time:     Pollo Bee  NEUROLOGY  [address] [place] [address]  Phone: [phone]  Fax: [phone]  Follow Up Time:

## 2020-12-10 NOTE — DISCHARGE NOTE PROVIDER - HOSPITAL COURSE
admitted for fall with pelvic fracture  non surgical intervention  PT for ambulation  pain control with analgesics  DC after PT and ORTHO clearance admitted for fall with pelvic fracture  non surgical intervention  PT for ambulation  pain control with analgesics  DC after PT and ORTHO clearance  Going to acute rehab    >35 minutes spent on discharge

## 2020-12-11 ENCOUNTER — TRANSCRIPTION ENCOUNTER (OUTPATIENT)
Age: 85
End: 2020-12-11

## 2020-12-11 DIAGNOSIS — K59.00 CONSTIPATION, UNSPECIFIED: ICD-10-CM

## 2020-12-11 LAB
FOLATE SERPL-MCNC: 6.3 NG/ML — SIGNIFICANT CHANGE UP
HCT VFR BLD CALC: 30.5 % — LOW (ref 34.5–45)
HGB BLD-MCNC: 9.6 G/DL — LOW (ref 11.5–15.5)
MCHC RBC-ENTMCNC: 27.4 PG — SIGNIFICANT CHANGE UP (ref 27–34)
MCHC RBC-ENTMCNC: 31.5 GM/DL — LOW (ref 32–36)
MCV RBC AUTO: 87.1 FL — SIGNIFICANT CHANGE UP (ref 80–100)
NRBC # BLD: 0 /100 WBCS — SIGNIFICANT CHANGE UP (ref 0–0)
PLATELET # BLD AUTO: 303 K/UL — SIGNIFICANT CHANGE UP (ref 150–400)
RBC # BLD: 3.5 M/UL — LOW (ref 3.8–5.2)
RBC # FLD: 14.2 % — SIGNIFICANT CHANGE UP (ref 10.3–14.5)
TSH SERPL-MCNC: 6.26 UIU/ML — HIGH (ref 0.36–3.74)
VIT B12 SERPL-MCNC: 250 PG/ML — SIGNIFICANT CHANGE UP (ref 232–1245)
WBC # BLD: 12.69 K/UL — HIGH (ref 3.8–10.5)
WBC # FLD AUTO: 12.69 K/UL — HIGH (ref 3.8–10.5)

## 2020-12-11 PROCEDURE — 76857 US EXAM PELVIC LIMITED: CPT | Mod: 26

## 2020-12-11 RX ORDER — INSULIN GLARGINE 100 [IU]/ML
15 INJECTION, SOLUTION SUBCUTANEOUS EVERY MORNING
Refills: 0 | Status: DISCONTINUED | OUTPATIENT
Start: 2020-12-12 | End: 2020-12-14

## 2020-12-11 RX ORDER — CIPROFLOXACIN LACTATE 400MG/40ML
1 VIAL (ML) INTRAVENOUS
Qty: 0 | Refills: 0 | DISCHARGE
Start: 2020-12-11

## 2020-12-11 RX ORDER — GLYCERIN ADULT
1 SUPPOSITORY, RECTAL RECTAL DAILY
Refills: 0 | Status: DISCONTINUED | OUTPATIENT
Start: 2020-12-11 | End: 2020-12-12

## 2020-12-11 RX ORDER — INSULIN GLARGINE 100 [IU]/ML
15 INJECTION, SOLUTION SUBCUTANEOUS ONCE
Refills: 0 | Status: COMPLETED | OUTPATIENT
Start: 2020-12-11 | End: 2020-12-11

## 2020-12-11 RX ORDER — INSULIN LISPRO 100/ML
5 VIAL (ML) SUBCUTANEOUS
Refills: 0 | Status: DISCONTINUED | OUTPATIENT
Start: 2020-12-11 | End: 2020-12-14

## 2020-12-11 RX ORDER — TAMSULOSIN HYDROCHLORIDE 0.4 MG/1
0.8 CAPSULE ORAL AT BEDTIME
Refills: 0 | Status: DISCONTINUED | OUTPATIENT
Start: 2020-12-11 | End: 2020-12-14

## 2020-12-11 RX ORDER — POLYETHYLENE GLYCOL 3350 17 G/17G
17 POWDER, FOR SOLUTION ORAL
Refills: 0 | Status: DISCONTINUED | OUTPATIENT
Start: 2020-12-11 | End: 2020-12-14

## 2020-12-11 RX ADMIN — Medication 2: at 16:49

## 2020-12-11 RX ADMIN — SENNA PLUS 2 TABLET(S): 8.6 TABLET ORAL at 21:33

## 2020-12-11 RX ADMIN — HEPARIN SODIUM 5000 UNIT(S): 5000 INJECTION INTRAVENOUS; SUBCUTANEOUS at 05:41

## 2020-12-11 RX ADMIN — POLYETHYLENE GLYCOL 3350 17 GRAM(S): 17 POWDER, FOR SOLUTION ORAL at 13:17

## 2020-12-11 RX ADMIN — HEPARIN SODIUM 5000 UNIT(S): 5000 INJECTION INTRAVENOUS; SUBCUTANEOUS at 21:33

## 2020-12-11 RX ADMIN — Medication 6: at 08:16

## 2020-12-11 RX ADMIN — Medication 20 MILLIGRAM(S): at 05:41

## 2020-12-11 RX ADMIN — Medication 100 MILLIGRAM(S): at 05:42

## 2020-12-11 RX ADMIN — Medication 1 PACKET(S): at 05:45

## 2020-12-11 RX ADMIN — LISINOPRIL 40 MILLIGRAM(S): 2.5 TABLET ORAL at 05:42

## 2020-12-11 RX ADMIN — Medication 5 UNIT(S): at 16:50

## 2020-12-11 RX ADMIN — Medication 4: at 11:25

## 2020-12-11 RX ADMIN — SIMVASTATIN 5 MILLIGRAM(S): 20 TABLET, FILM COATED ORAL at 21:33

## 2020-12-11 RX ADMIN — Medication 112 MICROGRAM(S): at 05:41

## 2020-12-11 RX ADMIN — HEPARIN SODIUM 5000 UNIT(S): 5000 INJECTION INTRAVENOUS; SUBCUTANEOUS at 13:15

## 2020-12-11 RX ADMIN — TAMSULOSIN HYDROCHLORIDE 0.8 MILLIGRAM(S): 0.4 CAPSULE ORAL at 21:34

## 2020-12-11 RX ADMIN — Medication 15 GRAM(S): at 13:18

## 2020-12-11 RX ADMIN — Medication 500 MILLIGRAM(S): at 17:04

## 2020-12-11 RX ADMIN — Medication 500 MILLIGRAM(S): at 05:44

## 2020-12-11 NOTE — PROGRESS NOTE ADULT - ASSESSMENT
12.10.2020 This is an 85 F who came in s/p fall. Patient is independent of all ADLs and IADLs. She lives alone. She doesn't drive. Her daughter is her HCP. Patient is planning to go to acute rehab from here.    12.11.2020 Patient feels well. D/C planning to acute rehab.

## 2020-12-11 NOTE — PROGRESS NOTE ADULT - SUBJECTIVE AND OBJECTIVE BOX
Patient is a 85y old  Female who presents with a chief complaint of Pelvic and left humerus fracture (11 Dec 2020 15:43)      INTERVAL /OVERNIGHT EVENTS: c/o constipation    MEDICATIONS  (STANDING):  ciprofloxacin     Tablet 500 milliGRAM(s) Oral every 12 hours  dextrose 40% Gel 15 Gram(s) Oral once  dextrose 50% Injectable 25 Gram(s) IV Push once  dextrose 50% Injectable 12.5 Gram(s) IV Push once  dextrose 50% Injectable 25 Gram(s) IV Push once  furosemide    Tablet 20 milliGRAM(s) Oral daily  glucagon  Injectable 1 milliGRAM(s) IntraMuscular once  heparin   Injectable 5000 Unit(s) SubCutaneous every 8 hours  insulin lispro (ADMELOG) corrective regimen sliding scale   SubCutaneous three times a day before meals  insulin lispro Injectable (ADMELOG) 5 Unit(s) SubCutaneous three times a day before meals  levothyroxine 112 MICROGram(s) Oral daily  lisinopril 40 milliGRAM(s) Oral daily  metoprolol succinate  milliGRAM(s) Oral daily  polyethylene glycol 3350 17 Gram(s) Oral daily  psyllium Powder 1 Packet(s) Oral every 24 hours  senna 2 Tablet(s) Oral at bedtime  simvastatin 5 milliGRAM(s) Oral at bedtime  tamsulosin 0.4 milliGRAM(s) Oral at bedtime  urea Oral Powder 15 Gram(s) Oral daily    MEDICATIONS  (PRN):  acetaminophen   Tablet .. 650 milliGRAM(s) Oral every 6 hours PRN Mild Pain (1 - 3)  bisacodyl 5 milliGRAM(s) Oral every 12 hours PRN Constipation  magnesium hydroxide Suspension 30 milliLiter(s) Oral daily PRN Constipation  traMADol 25 milliGRAM(s) Oral four times a day PRN Moderate Pain (4 - 6)      Allergies    penicillin (Muscle Pain)    Intolerances        REVIEW OF SYSTEMS:  CONSTITUTIONAL: No fever, weight loss, or fatigue  EYES: No eye pain, visual disturbances, or discharge  ENMT:  No difficulty hearing, tinnitus, vertigo; No sinus or throat pain  NECK: No pain or stiffness  RESPIRATORY: No cough, wheezing, chills or hemoptysis; No shortness of breath  CARDIOVASCULAR: No chest pain, palpitations, dizziness, or leg swelling  GASTROINTESTINAL: No abdominal or epigastric pain. No nausea, vomiting, or hematemesis; +constipation. No melena or hematochezia.  GENITOURINARY: No dysuria, frequency, hematuria, or incontinence  NEUROLOGICAL: No headaches, memory loss, loss of strength, numbness, or tremors  SKIN: No itching, burning, rashes, or lesions   LYMPH NODES: No enlarged glands  ENDOCRINE: No heat or cold intolerance; No hair loss; No polydipsia or polyuria  MUSCULOSKELETAL: No joint pain or swelling; No muscle, back, or extremity pain  PSYCHIATRIC: No depression, anxiety, mood swings, or difficulty sleeping  HEME/LYMPH: No easy bruising, or bleeding gums  ALLERGY AND IMMUNOLOGIC: No hives or eczema    Vital Signs Last 24 Hrs  T(C): 36.5 (11 Dec 2020 13:14), Max: 37.4 (10 Dec 2020 21:16)  T(F): 97.7 (11 Dec 2020 13:14), Max: 99.4 (10 Dec 2020 21:16)  HR: 96 (11 Dec 2020 13:14) (61 - 96)  BP: 128/77 (11 Dec 2020 13:14) (128/77 - 159/83)  BP(mean): --  RR: 20 (11 Dec 2020 13:14) (18 - 20)  SpO2: 97% (11 Dec 2020 13:14) (91% - 97%)    PHYSICAL EXAM:  GENERAL: NAD, well-groomed, well-developed  HEAD:  Atraumatic, Normocephalic  EYES: EOMI, PERRLA, conjunctiva and sclera clear  ENMT: No tonsillar erythema, exudates, or enlargement; Moist mucous membranes, Good dentition, No lesions  NECK: Supple, No JVD, Normal thyroid  NERVOUS SYSTEM:  Alert & Oriented X3, Good concentration; Motor Strength 5/5 B/L upper and lower extremities; DTRs 2+ intact and symmetric  CHEST/LUNG: Clear to auscultation bilaterally; No rales, rhonchi, wheezing, or rubs  HEART: Regular rate and rhythm; No murmurs, rubs, or gallops  ABDOMEN: Soft, Nontender, Nondistended; Bowel sounds present  EXTREMITIES:  2+ Peripheral Pulses, No clubbing, cyanosis, or edema  LYMPH: No lymphadenopathy noted  SKIN: No rashes or lesions    LABS:                        9.6    12.69 )-----------( 303      ( 11 Dec 2020 06:26 )             30.5               CAPILLARY BLOOD GLUCOSE      POCT Blood Glucose.: 165 mg/dL (11 Dec 2020 16:22)  POCT Blood Glucose.: 187 mg/dL (11 Dec 2020 11:57)  POCT Blood Glucose.: 207 mg/dL (11 Dec 2020 11:16)  POCT Blood Glucose.: 273 mg/dL (11 Dec 2020 07:42)  POCT Blood Glucose.: 205 mg/dL (10 Dec 2020 23:54)      RADIOLOGY & ADDITIONAL TESTS:    Notes Reviewed:  [x ] YES  [ ] NO    Care Discussed with Consultants/Other Providers [x ] YES  [ ] NO

## 2020-12-11 NOTE — PROGRESS NOTE ADULT - SUBJECTIVE AND OBJECTIVE BOX
Neurology follow up note    GINGER BRISENO85yFemale      Interval History:    Patient feels ok no new complaints.    MEDICATIONS    acetaminophen   Tablet .. 650 milliGRAM(s) Oral every 6 hours PRN  bisacodyl 5 milliGRAM(s) Oral every 12 hours PRN  ciprofloxacin     Tablet 500 milliGRAM(s) Oral every 12 hours  dextrose 40% Gel 15 Gram(s) Oral once  dextrose 50% Injectable 25 Gram(s) IV Push once  dextrose 50% Injectable 12.5 Gram(s) IV Push once  dextrose 50% Injectable 25 Gram(s) IV Push once  furosemide    Tablet 20 milliGRAM(s) Oral daily  glucagon  Injectable 1 milliGRAM(s) IntraMuscular once  heparin   Injectable 5000 Unit(s) SubCutaneous every 8 hours  insulin glargine Injectable (LANTUS) 15 Unit(s) SubCutaneous once  insulin lispro (ADMELOG) corrective regimen sliding scale   SubCutaneous three times a day before meals  insulin lispro Injectable (ADMELOG) 5 Unit(s) SubCutaneous three times a day before meals  levothyroxine 112 MICROGram(s) Oral daily  lisinopril 40 milliGRAM(s) Oral daily  magnesium hydroxide Suspension 30 milliLiter(s) Oral daily PRN  metoprolol succinate  milliGRAM(s) Oral daily  polyethylene glycol 3350 17 Gram(s) Oral daily  psyllium Powder 1 Packet(s) Oral every 24 hours  senna 2 Tablet(s) Oral at bedtime  simvastatin 5 milliGRAM(s) Oral at bedtime  tamsulosin 0.4 milliGRAM(s) Oral at bedtime  traMADol 25 milliGRAM(s) Oral four times a day PRN  urea Oral Powder 15 Gram(s) Oral daily      Allergies    penicillin (Muscle Pain)    Intolerances            Vital Signs Last 24 Hrs  T(C): 36.6 (11 Dec 2020 05:04), Max: 37.4 (10 Dec 2020 21:16)  T(F): 97.9 (11 Dec 2020 05:04), Max: 99.4 (10 Dec 2020 21:16)  HR: 94 (11 Dec 2020 05:04) (61 - 102)  BP: 159/83 (11 Dec 2020 05:04) (123/72 - 159/83)  BP(mean): --  RR: 18 (11 Dec 2020 05:04) (18 - 18)  SpO2: 97% (11 Dec 2020 05:04) (91% - 97%)        REVIEW OF SYSTEMS:  Constitutional:  The patient denies fever, chills, night sweats.  Head:  No headaches.  Eyes:  No double vision or blurry vision.  Ears:  No ringing in the ears.  Neck:  No neck pain.  Cardiovascular:  No chest pain.  Respiratory:  No shortness of breath.  Abdomen:  No nausea, vomiting or abdominal pain.  Extremities/Neurological:  No numbness or tingling.  Musculoskeletal:  Positive pain of her left arm but she did sustain a fracture.    PHYSICAL EXAMINATION:  HEENT:  Head normocephalic, atraumatic.  Eyes:  No scleral icterus.  Ears:  Hearing bilaterally intact.  NECK:  Supple.  CARDIOVASCULAR:  S1 and S2 heard.  RESPIRATORY:  Good air entry bilaterally.  ABDOMEN:  Soft, nontender.  EXTREMITIES:  No clubbing or cyanosis were noted.  NEUROLOGIC:  The patient is awake, alert.  Location was home, year 2020 Recall was 0/3 within 5 minutes.  Was able to name simple objects.  Able to follow complex commands, took right hand and touched left ear.  Extraocular movements were intact.  Pupils equal, round, and reactive bilaterally, 3 mm to 2.  Speech was fluent.  Smile symmetric.  Motor:  Right upper 5/5, left upper secondary to fracture had significant pain, not tested, but distally was able to wiggle her fingers.  Bilateral lower extremities were 4/5.  Sensory:  Bilateral upper and lower intact to light touch.          LABS:  CBC Full  -  ( 11 Dec 2020 06:26 )  WBC Count : 12.69 K/uL  RBC Count : 3.50 M/uL  Hemoglobin : 9.6 g/dL  Hematocrit : 30.5 %  Platelet Count - Automated : 303 K/uL  Mean Cell Volume : 87.1 fl  Mean Cell Hemoglobin : 27.4 pg  Mean Cell Hemoglobin Concentration : 31.5 gm/dL  Auto Neutrophil # : x  Auto Lymphocyte # : x  Auto Monocyte # : x  Auto Eosinophil # : x  Auto Basophil # : x  Auto Neutrophil % : x  Auto Lymphocyte % : x  Auto Monocyte % : x  Auto Eosinophil % : x  Auto Basophil % : x      12-09    136  |  x   |  x   ----------------------------<  x   x    |  x   |  0.80      TPro  x   /  Alb  x   /  TBili  0.7  /  DBili  x   /  AST  x   /  ALT  x   /  AlkPhos  x   12-09    Hemoglobin A1C:       Vitamin B12   PT/INR - ( 09 Dec 2020 10:01 )   PT: 13.0 sec;   INR: 1.12 ratio               RADIOLOGY      ANALYSIS AND PLAN:  This is an 85-year-old with episode of altered mental status.    For episode of altered mental status, suspect most likely secondary to mild cognitive impairment becoming more prominent in the hospital setting, suspect less likely this is from a cerebrovascular accident, primary CNS event even though examination was limited to the left upper extremity.   mild cognitive impairment supportive treatment.    Recommend followup basic chemistries as needed.  For history of diabetes, strict control of blood sugars.  For history of hypertension, monitor systolic blood pressure.  For history of high cholesterol, continue the patient on statin.  Fall precaution.  Would recommend if possible please limit the use of pain medication  TSH noted adjustment of medications as needed      daughter, Marleny, at [phone] called today went to voicemail 12/11/2020 She does understand while in the hospital setting may become more disoriented.    .Greater than 45 minutes of time was spent with the patient, plan of care, reviewing data, speaking to the family and  50% of the visit was spent counseling and/or coordinating care with multidisciplinary healthcare team

## 2020-12-11 NOTE — PROGRESS NOTE ADULT - SUBJECTIVE AND OBJECTIVE BOX
Patient is a 85y old  Female who presents with a chief complaint of Pelvic and left humerus fracture (11 Dec 2020 08:48)      Subjective: Patient seen and examined at bedside. No acute events overnight.     PAIN: N  DYSPNEA: N	  NAUS/VOM: N	  SECRETIONS: N	  AGITATION: N    OTHER REVIEW OF SYSTEMS: negative      T(C): 36.5 (12-11-20 @ 13:14), Max: 37.4 (12-10-20 @ 21:16)  HR: 96 (12-11-20 @ 13:14) (61 - 96)  BP: 128/77 (12-11-20 @ 13:14) (128/77 - 159/83)  RR: 20 (12-11-20 @ 13:14) (18 - 20)  SpO2: 97% (12-11-20 @ 13:14) (91% - 97%)  Wt(kg): --  GENERAL:  resting comfortably in NAD  HEENT:  NC/AT EOMI PERRL  NECK: supple no JVD  CVS:  +S1 S2 RRR  RESP: CTA B/L  GI:  soft NT/ND +BS  : no suprapubic tenderness  MUSC:  no lower extremity edema  PSYCH:  Alert, Calm, Cooperative   SKIN:  Warm, moist, no rashes   LYMPH: normal     MEDS REVIEWED	          OPIATE NAÏVE (Y/N)    penicillin (Muscle Pain)      LABS REVIEWED:                        9.6    12.69 )-----------( 303      ( 11 Dec 2020 06:26 )             30.5             IMAGING REVIEWED    ADVANCED DIRECTIVES:     FULL CODE     DNR     DNI     LIVING WILL     MOL    PSYCHOSOCIAL-SPIRITUAL ASSESSMENT:    ___Reviewed     ___Care  plan unchanged     ___Care plan adjusted as below    GOALS OF CARE DISCUSSION  	___Palliative care info/counseling provided	    ___Family meeting  	___Advanced Directives addressed	    ___See previous Palliative Medicine Note    AGENCY CHOICE DISCUSSED:   ___HOSPICE   ___CALVARY  ___OTHER:              > 50% OF THE TIME SPENT IN COUNSELING AND COORDINATING CARE 	    Minutes:      PROLONGED SERVICE             FACE TO FACE:    PT            PT & FAMILY	       Minutes:      Advance Care Planning Time:

## 2020-12-11 NOTE — DISCHARGE NOTE NURSING/CASE MANAGEMENT/SOCIAL WORK - PATIENT PORTAL LINK FT
You can access the FollowMyHealth Patient Portal offered by [facility] by registering at the following website: [email or web address]. By joining [facility]’s FollowMyHealth portal, you will also be able to view your health information using other applications (apps) compatible with our system.

## 2020-12-11 NOTE — PROGRESS NOTE ADULT - SUBJECTIVE AND OBJECTIVE BOX
[facility], Division of Infectious Diseases  MATILDE Cavanaugh Y. Patel, S. Shah  [phone]  ([phone] - weekdays after 5pm and weekends)    Name: GINGER BRISENO  Age/Gender: 85y Female  MRN: [id number]    Interval History:  Patient says she feels well with no complaints.  She denies fever, chills, chest pain, dyspnea, cough, abd pain, n/v/d, dysuria.  ROS reviewed, pertinent positives and negatives as above. Notes reviewed. Afebrile.     Allergies: penicillin (Muscle Pain)    Objective:  Vitals:   T(F): 97.9 (12-11-20 @ 05:04), Max: 99.4 (12-10-20 @ 21:16)  HR: 94 (12-11-20 @ 05:04) (61 - 102)  BP: 159/83 (12-11-20 @ 05:04) (123/72 - 159/83)  RR: 18 (12-11-20 @ 05:04) (18 - 18)  SpO2: 97% (12-11-20 @ 05:04) (91% - 98%)    Physical Examination:  General: no acute distress, nontoxic appearing  HEENT: NC/AT, EOMI, anicteric, neck supple  Cardio: S1, S2 heard, RRR, no murmurs  Resp: CTA bilaterally, no rales/wheezes/rhonchi  Abd: soft, NT, ND, + BS  Neuro: AAOx3, no obvious focal deficits  Ext: LUE in cast, no edema or cyanosis, moving extremities  Skin: warm, dry, no visible rash  Psych: appropriate affect and mood for situation    Laboratory Studies:  CBC:                       9.6    12.69 )-----------( 303      ( 11 Dec 2020 06:26 )             30.5     CMP: 12-09    136  |  x   |  x   ----------------------------<  x   x    |  x   |  0.80      TPro  x   /  Alb  x   /  TBili  0.7  /  DBili  x   /  AST  x   /  ALT  x   /  AlkPhos  x   12-09    Microbiology:  12/9 - COVID-19 PCR - negative  12/7 - urine culture - E.coli, pansensitive  -  Amikacin: S <=16      -  Amoxicillin/Clavulanic Acid: S <=8/4      -  Ampicillin: S <=8 These ampicillin results predict results for amoxicillin      -  Ampicillin/Sulbactam: S <=4/2 Enterobacter, Citrobacter, and Serratia may develop resistance during prolonged therapy (3-4 days)      -  Aztreonam: S <=4      -  Cefazolin: S <=2 (MIC_CL_COM_ENTERIC_[id number]) For uncomplicated UTI with K. pneumoniae, E. coli, or P. mirablis: ASHLEY <=16 is sensitive and ASHLEY >=32 is resistant. This also predicts results for oral agents cefaclor, cefdinir, cefpodoxime, cefprozil, cefuroxime axetil, cephalexin and locarbef for uncomplicated UTI. Note that some isolates may be susceptible to these agents while testing resistant to cefazolin.      -  Cefepime: S <=2      -  Cefoxitin: S <=8      -  Ceftriaxone: S <=1 Enterobacter, Citrobacter, and Serratia may develop resistance during prolonged therapy      -  Ciprofloxacin: S <=0.25      -  Ertapenem: S <=0.5      -  Gentamicin: S <=2      -  Imipenem: S <=1      -  Levofloxacin: S <=0.5      -  Meropenem: S <=1      -  Nitrofurantoin: S <=32 Should not be used to treat pyelonephritis      -  Piperacillin/Tazobactam: S <=8      -  Tigecycline: S <=2      -  Tobramycin: S <=2      -  Trimethoprim/Sulfamethoxazole: S <=0.5/9.5  12/7 - COVID-19 ab - negative  12/6 - COVID-19 PCR - negative        Radiology:  US Urinary Bladder (12.11.20 @ 08:07) > Bladder ultrasound. Bladder distends up to 707 cc. No wall thickening or abnormal intraluminal echoes. The patient did not void at the time of the examination. Impression: As above  Xray Shoulder 2 Views, Left (12.10.20 @ 09:40) > IMPRESSION: Acute fracture of the left humeral neck without further angulation.  Xray Wrist Post Reduction AP/Lat, Left (12.08.20 @ 08:25) > IMPRESSION: AP and lateral radiographs of the left wrist were obtained in a cast. Casting partially obscures osseous detail. A transverse distal radial neck fracture is partially visualized and is adequately aligned. There is a dorsal comminuted fragments. Previously seen ulnar styloid fracture is not well visualized.    Medications:  MEDICATIONS  (STANDING):  ciprofloxacin     Tablet 500 milliGRAM(s) Oral every 12 hours  dextrose 40% Gel 15 Gram(s) Oral once  dextrose 50% Injectable 25 Gram(s) IV Push once  dextrose 50% Injectable 12.5 Gram(s) IV Push once  dextrose 50% Injectable 25 Gram(s) IV Push once  furosemide    Tablet 20 milliGRAM(s) Oral daily  glucagon  Injectable 1 milliGRAM(s) IntraMuscular once  heparin   Injectable 5000 Unit(s) SubCutaneous every 8 hours  insulin glargine Injectable (LANTUS) 15 Unit(s) SubCutaneous once  insulin lispro (ADMELOG) corrective regimen sliding scale   SubCutaneous three times a day before meals  insulin lispro Injectable (ADMELOG) 5 Unit(s) SubCutaneous three times a day before meals  levothyroxine 112 MICROGram(s) Oral daily  lisinopril 40 milliGRAM(s) Oral daily  metoprolol succinate  milliGRAM(s) Oral daily  polyethylene glycol 3350 17 Gram(s) Oral daily  psyllium Powder 1 Packet(s) Oral every 24 hours  senna 2 Tablet(s) Oral at bedtime  simvastatin 5 milliGRAM(s) Oral at bedtime  tamsulosin 0.4 milliGRAM(s) Oral at bedtime  urea Oral Powder 15 Gram(s) Oral daily    Antibiotics:  ciprofloxacin     Tablet 500 milliGRAM(s) Oral every 12 hours - started 12/10  s/p ertapenem 12/6

## 2020-12-11 NOTE — PROGRESS NOTE ADULT - SUBJECTIVE AND OBJECTIVE BOX
CAPILLARY BLOOD GLUCOSE      POCT Blood Glucose.: 273 mg/dL (11 Dec 2020 07:42)  POCT Blood Glucose.: 205 mg/dL (10 Dec 2020 23:54)  POCT Blood Glucose.: 154 mg/dL (10 Dec 2020 16:04)  POCT Blood Glucose.: 271 mg/dL (10 Dec 2020 11:32)      Vital Signs Last 24 Hrs  T(C): 36.6 (11 Dec 2020 05:04), Max: 37.4 (10 Dec 2020 21:16)  T(F): 97.9 (11 Dec 2020 05:04), Max: 99.4 (10 Dec 2020 21:16)  HR: 94 (11 Dec 2020 05:04) (61 - 102)  BP: 159/83 (11 Dec 2020 05:04) (123/72 - 159/83)  BP(mean): --  RR: 18 (11 Dec 2020 05:04) (18 - 18)  SpO2: 97% (11 Dec 2020 05:04) (91% - 98%)    General: WN/WD NAD  Respiratory: CTA B/L  CV: RRR, S1S2, no murmurs, rubs or gallops  Abdominal: Soft, NT, ND +BS, Last BM  Extremities: No edema, + peripheral pulses     12-09    136  |  x   |  x   ----------------------------<  x   x    |  x   |  0.80      TPro  x   /  Alb  x   /  TBili  0.7  /  DBili  x   /  AST  x   /  ALT  x   /  AlkPhos  x   12-09      dextrose 40% Gel 15 Gram(s) Oral once  dextrose 50% Injectable 25 Gram(s) IV Push once  dextrose 50% Injectable 12.5 Gram(s) IV Push once  dextrose 50% Injectable 25 Gram(s) IV Push once  glucagon  Injectable 1 milliGRAM(s) IntraMuscular once  insulin glargine Injectable (LANTUS) 10 Unit(s) SubCutaneous every morning  insulin lispro (ADMELOG) corrective regimen sliding scale   SubCutaneous three times a day before meals  insulin lispro Injectable (ADMELOG) 3 Unit(s) SubCutaneous three times a day before meals  levothyroxine 112 MICROGram(s) Oral daily  simvastatin 5 milliGRAM(s) Oral at bedtime

## 2020-12-12 DIAGNOSIS — Z74.09 OTHER REDUCED MOBILITY: ICD-10-CM

## 2020-12-12 DIAGNOSIS — S32.10XD UNSPECIFIED FRACTURE OF SACRUM, SUBSEQUENT ENCOUNTER FOR FRACTURE WITH ROUTINE HEALING: ICD-10-CM

## 2020-12-12 DIAGNOSIS — S42.202D UNSPECIFIED FRACTURE OF UPPER END OF LEFT HUMERUS, SUBSEQUENT ENCOUNTER FOR FRACTURE WITH ROUTINE HEALING: ICD-10-CM

## 2020-12-12 DIAGNOSIS — R53.81 OTHER MALAISE: ICD-10-CM

## 2020-12-12 DIAGNOSIS — R26.81 UNSTEADINESS ON FEET: ICD-10-CM

## 2020-12-12 RX ORDER — OXYCODONE HYDROCHLORIDE 5 MG/1
5 TABLET ORAL ONCE
Refills: 0 | Status: DISCONTINUED | OUTPATIENT
Start: 2020-12-12 | End: 2020-12-12

## 2020-12-12 RX ADMIN — OXYCODONE HYDROCHLORIDE 5 MILLIGRAM(S): 5 TABLET ORAL at 17:19

## 2020-12-12 RX ADMIN — POLYETHYLENE GLYCOL 3350 17 GRAM(S): 17 POWDER, FOR SOLUTION ORAL at 06:04

## 2020-12-12 RX ADMIN — Medication 100 MILLIGRAM(S): at 06:03

## 2020-12-12 RX ADMIN — Medication 20 MILLIGRAM(S): at 06:03

## 2020-12-12 RX ADMIN — Medication 112 MICROGRAM(S): at 06:03

## 2020-12-12 RX ADMIN — HEPARIN SODIUM 5000 UNIT(S): 5000 INJECTION INTRAVENOUS; SUBCUTANEOUS at 06:04

## 2020-12-12 RX ADMIN — HEPARIN SODIUM 5000 UNIT(S): 5000 INJECTION INTRAVENOUS; SUBCUTANEOUS at 15:20

## 2020-12-12 RX ADMIN — OXYCODONE HYDROCHLORIDE 5 MILLIGRAM(S): 5 TABLET ORAL at 12:46

## 2020-12-12 RX ADMIN — Medication 5 UNIT(S): at 11:15

## 2020-12-12 RX ADMIN — TAMSULOSIN HYDROCHLORIDE 0.8 MILLIGRAM(S): 0.4 CAPSULE ORAL at 22:13

## 2020-12-12 RX ADMIN — Medication 500 MILLIGRAM(S): at 06:04

## 2020-12-12 RX ADMIN — Medication 650 MILLIGRAM(S): at 12:20

## 2020-12-12 RX ADMIN — Medication 15 GRAM(S): at 13:16

## 2020-12-12 RX ADMIN — HEPARIN SODIUM 5000 UNIT(S): 5000 INJECTION INTRAVENOUS; SUBCUTANEOUS at 22:12

## 2020-12-12 RX ADMIN — POLYETHYLENE GLYCOL 3350 17 GRAM(S): 17 POWDER, FOR SOLUTION ORAL at 17:21

## 2020-12-12 RX ADMIN — Medication 650 MILLIGRAM(S): at 11:20

## 2020-12-12 RX ADMIN — OXYCODONE HYDROCHLORIDE 5 MILLIGRAM(S): 5 TABLET ORAL at 13:46

## 2020-12-12 RX ADMIN — OXYCODONE HYDROCHLORIDE 5 MILLIGRAM(S): 5 TABLET ORAL at 17:34

## 2020-12-12 RX ADMIN — Medication 1 PACKET(S): at 06:04

## 2020-12-12 RX ADMIN — SENNA PLUS 2 TABLET(S): 8.6 TABLET ORAL at 22:12

## 2020-12-12 RX ADMIN — SIMVASTATIN 5 MILLIGRAM(S): 20 TABLET, FILM COATED ORAL at 22:13

## 2020-12-12 RX ADMIN — Medication 5 UNIT(S): at 17:22

## 2020-12-12 RX ADMIN — Medication 500 MILLIGRAM(S): at 17:20

## 2020-12-12 RX ADMIN — Medication 8: at 11:15

## 2020-12-12 RX ADMIN — INSULIN GLARGINE 15 UNIT(S): 100 INJECTION, SOLUTION SUBCUTANEOUS at 11:17

## 2020-12-12 RX ADMIN — LISINOPRIL 40 MILLIGRAM(S): 2.5 TABLET ORAL at 06:04

## 2020-12-12 NOTE — CHART NOTE - NSCHARTNOTEFT_GEN_A_CORE
Pt seen and examined at bedside for pain in the left wrist. Short arm cast in place, minimal swelling of digits, full ROM of digits. AIN/PIN/Uln intact SILT intact. Reassured pt that this is normal swelling and pain after a fracture. Explained to pt benefit of elevation and movement of fingers. Elevated in abduction pillow. NWB LUE, continue ROM fingers, pain control, ice as needed. Pt agrees.

## 2020-12-12 NOTE — CONSULT NOTE ADULT - ASSESSMENT
85F with L humerus, Left forearm/wrist, and pelvic fractures following mechanical fall in setting of UTI, now with deficits in mobility, ADL's, gait with weakness, deconditioning.    Considering patients current functional status in relation to her prior status of being independent to modified independent, patient stands to benefit from short stay in acute inpatient rehabilitation including PT/OT program to include therapeutic exercise, therapeutic activity, gait training, balance training, fitting of least restrictive device possibly hemiwalker. She is able to perform at least 3 hours of therapy per day, 5 days per week, and requires physician supervision in light of her risk of DVT, ongoing adjustment of diabetic management in consultation with endocrinology, ongoing treatment for UTI/urinary retention and multiple fractures in setting of advanced age.     Therapy recs: Avoid eccentric contraction of gastroc-soleus complex in light of quinolone use, non weight bearing through LUE with no ROM at the shoulder.    Spoke with patients daughter Marleny who is an RN and is in agreement with disposition plan for acute inpatient rehabilitation.

## 2020-12-12 NOTE — CONSULT NOTE ADULT - SUBJECTIVE AND OBJECTIVE BOX
Physical Medicine and Rehabilitation Initial Evaluation    Patients acute care records reviewed and are summarized as follows:     Patient is a 85F with past medical history including HTN, DM, HLD, primary hyperparathyroidism who is admitted to acute care following a mechanical fall during which she sustained a left distal radius fracture, left proximal humerus fracture, and pelvic fracture. Patients hospital course thus far significant for UTI being treated with ciprofloxacin for 5 days, and altered mental status which is felt to be exacerbation of pre-existing mild dementia in hospital setting and in setting of aforementioned UTI. Patient seen and consulted by orthopedic service which recommends conservative/nonsurgical management. She is post reduction of the L wrist/forearm fractures, patients L forearm now in cast, LLE in abduction sling with NWB status recommended as well through LUE.     Radiological studies reviewed, including CT pelvis which shows left sided superior and inferior pubic rami fractures, nondisplaced bilateral sacral alar fractures, moderate to severe hip arthrosis. CT of the head and cervical spine significant for cervical spondylotic changes, chronic cerebral microvascular ischemic disease correlating clinically with cognitive deficits, and XR of the L humerus showing humeral neck fracture without angulation, XR of the left elbow is without any overt fractures, XR of the left wrist significant for probable nondisplaced fracture through left distal radius with volar angulation, probable nondisplaced fracture of the left ulnar styloid.     Medical studies/laboratory studies reviewed, including: Most recent CBC which is significant for leukocytosis to 15.09, anemia mild to 10.2/31.0. BMP as follows:                      9.6    12.69 )-----------( 303      ( 11 Dec 2020 06:26 )             30.5     The patient was seen and examined at bedside.    Functionally, patient previously would ambulated modified independent with RW or single point cane, mostly with cane for shorter and household distances. Was independent with ADL's and other mobility. Patient's most recent therapy notes also reviewed, patient is now performing bed mobility sit to supine with mod A, bridging with min A, limited by orthopedic precautions precluding use of LUE decreased strength, deconditioning. Transfers are as follow: Bed to chair with mod , requiring 2 person assist, setup, impaired by decreased LE strength, deconditioning, impaired balance, poor postural control. /sit to stand being performed with max A, limited due to similar issues mentioned prior.     ROS:  Constitutional: Denies fevers or chills  Eyes: Denies blurry vision or double vision  Ears/Nose/Mouth/Throat: Denies any pain on swallowing or dry mouth or runny nose  CV: Denies chest pain or palpitations  Respiratory: Denies cough, complains of dyspnea on exertion/poor exercise tolerance and endurance  GI: Denies nausea, vomiting, abdominal pain  : Denies urinary frequency or dysuria  MSK: Complains of LUE pain  Neuro: Denies any headache or dizziness  Psychiatric: Denies depression or anxiety    PM, Social, Family Hx: as above in HPI, Family history reviewed and found to be non pertinent to patients current disposition, denies history of alcohol, illicit drug use, tobacco use.    Physical Exam:   Vitals: Vital Signs Last 24 Hrs  T(C): 36.3 (12 Dec 2020 12:36), Max: 36.7 (11 Dec 2020 21:05)  T(F): 97.4 (12 Dec 2020 12:36), Max: 98 (11 Dec 2020 21:05)  HR: 89 (12 Dec 2020 12:36) (86 - 106)  BP: 124/70 (12 Dec 2020 12:36) (124/70 - 188/81)  BP(mean): --  RR: 18 (12 Dec 2020 12:36) (18 - 18)  SpO2: 93% (12 Dec 2020 12:36) (93% - 95%)    Constitutional: Gen: In no acute distress, cooperative with exam and questioning   Eyes: PERRL, no erythema of conjunctivae  Ears/Nose/Mouth/Throat: Mucous membranes moist, no thrush, no rhinorrhea  CV: Regular rate and rhythm, S1 S2, bilateral lower extremity pitting peripheral edema, pedal pulses intact  Resp: Good respiratory effort, Good air movement all lung fields  GI: Nontender, normoactive bowel sounds  Neuro: Cranial Nerves II-XII intact, sensation intact to light touch in peripheral upper and lower extremities  MSK: No cyanosis or clubbing of nails, strength 4-/5 in RUE and bilateral lower extremities with LUE not tested secondary to orthopedic precautions, ROM full in all extremities passively with LUE not tested  Neck: No midline tenderness to palpation, supple  Skin: No rashes on limbs, normal temperature on palpation  Psychiatric: Awake alert fully oriented

## 2020-12-12 NOTE — PROGRESS NOTE ADULT - ASSESSMENT
12.10.2020 This is an 85 F who came in s/p fall. Patient is independent of all ADLs and IADLs. She lives alone. She doesn't drive. Her daughter is her HCP. Patient is planning to go to acute rehab from here.    12.11.2020 Patient feels well. D/C planning to acute rehab.    12.12.2020 C/O LUE pain. To receive oxycodone. Also has tramadol prn. D/C planning to acute rehab on Monday.

## 2020-12-12 NOTE — CONSULT NOTE ADULT - PROBLEM SELECTOR PROBLEM 4
Closed fracture of sacrum with routine healing, unspecified portion of sacrum, subsequent encounter
Diabetes mellitus

## 2020-12-12 NOTE — PROGRESS NOTE ADULT - SUBJECTIVE AND OBJECTIVE BOX
CAPILLARY BLOOD GLUCOSE      POCT Blood Glucose.: 302 mg/dL (12 Dec 2020 11:07)  POCT Blood Glucose.: 282 mg/dL (12 Dec 2020 07:41)  POCT Blood Glucose.: 164 mg/dL (11 Dec 2020 21:21)  POCT Blood Glucose.: 165 mg/dL (11 Dec 2020 16:22)      Vital Signs Last 24 Hrs  T(C): 36.3 (12 Dec 2020 12:36), Max: 36.7 (11 Dec 2020 21:05)  T(F): 97.4 (12 Dec 2020 12:36), Max: 98 (11 Dec 2020 21:05)  HR: 89 (12 Dec 2020 12:36) (86 - 106)  BP: 124/70 (12 Dec 2020 12:36) (124/70 - 188/81)  BP(mean): --  RR: 18 (12 Dec 2020 12:36) (18 - 18)  SpO2: 93% (12 Dec 2020 12:36) (93% - 95%)    General: WN/WD NAD  Respiratory: CTA B/L  CV: RRR, S1S2, no murmurs, rubs or gallops  Abdominal: Soft, NT, ND +BS, Last BM  Extremities: No edema, + peripheral pulses             dextrose 40% Gel 15 Gram(s) Oral once  dextrose 50% Injectable 25 Gram(s) IV Push once  dextrose 50% Injectable 12.5 Gram(s) IV Push once  dextrose 50% Injectable 25 Gram(s) IV Push once  glucagon  Injectable 1 milliGRAM(s) IntraMuscular once  insulin glargine Injectable (LANTUS) 15 Unit(s) SubCutaneous every morning  insulin lispro (ADMELOG) corrective regimen sliding scale   SubCutaneous three times a day before meals  insulin lispro Injectable (ADMELOG) 5 Unit(s) SubCutaneous three times a day before meals  levothyroxine 112 MICROGram(s) Oral daily  simvastatin 5 milliGRAM(s) Oral at bedtime

## 2020-12-12 NOTE — PROGRESS NOTE ADULT - SUBJECTIVE AND OBJECTIVE BOX
Patient is a 85y old  Female who presents with a chief complaint of Pelvic and left humerus fracture (12 Dec 2020 16:02)      INTERVAL /OVERNIGHT EVENTS: + BM, voiding OK    MEDICATIONS  (STANDING):  ciprofloxacin     Tablet 500 milliGRAM(s) Oral every 12 hours  dextrose 40% Gel 15 Gram(s) Oral once  dextrose 50% Injectable 25 Gram(s) IV Push once  dextrose 50% Injectable 12.5 Gram(s) IV Push once  dextrose 50% Injectable 25 Gram(s) IV Push once  furosemide    Tablet 20 milliGRAM(s) Oral daily  glucagon  Injectable 1 milliGRAM(s) IntraMuscular once  heparin   Injectable 5000 Unit(s) SubCutaneous every 8 hours  insulin glargine Injectable (LANTUS) 15 Unit(s) SubCutaneous every morning  insulin lispro (ADMELOG) corrective regimen sliding scale   SubCutaneous three times a day before meals  insulin lispro Injectable (ADMELOG) 5 Unit(s) SubCutaneous three times a day before meals  levothyroxine 112 MICROGram(s) Oral daily  lisinopril 40 milliGRAM(s) Oral daily  metoprolol succinate  milliGRAM(s) Oral daily  polyethylene glycol 3350 17 Gram(s) Oral two times a day  psyllium Powder 1 Packet(s) Oral every 24 hours  senna 2 Tablet(s) Oral at bedtime  simvastatin 5 milliGRAM(s) Oral at bedtime  tamsulosin 0.8 milliGRAM(s) Oral at bedtime  urea Oral Powder 15 Gram(s) Oral daily    MEDICATIONS  (PRN):  acetaminophen   Tablet .. 650 milliGRAM(s) Oral every 6 hours PRN Mild Pain (1 - 3)  bisacodyl 5 milliGRAM(s) Oral every 12 hours PRN Constipation  magnesium hydroxide Suspension 30 milliLiter(s) Oral daily PRN Constipation  traMADol 25 milliGRAM(s) Oral four times a day PRN Moderate Pain (4 - 6)      Allergies    penicillin (Muscle Pain)    Intolerances        REVIEW OF SYSTEMS:  CONSTITUTIONAL: No fever, weight loss, or fatigue  EYES: No eye pain, visual disturbances, or discharge  ENMT:  No difficulty hearing, tinnitus, vertigo; No sinus or throat pain  NECK: No pain or stiffness  RESPIRATORY: No cough, wheezing, chills or hemoptysis; No shortness of breath  CARDIOVASCULAR: No chest pain, palpitations, dizziness, or leg swelling  GASTROINTESTINAL: No abdominal or epigastric pain. No nausea, vomiting, or hematemesis; No diarrhea or constipation. No melena or hematochezia.  GENITOURINARY: No dysuria, frequency, hematuria, or incontinence  NEUROLOGICAL: No headaches, memory loss, loss of strength, numbness, or tremors  SKIN: No itching, burning, rashes, or lesions   LYMPH NODES: No enlarged glands  ENDOCRINE: No heat or cold intolerance; No hair loss; No polydipsia or polyuria  MUSCULOSKELETAL: No joint pain or swelling; No muscle, back, or extremity pain  PSYCHIATRIC: No depression, anxiety, mood swings, or difficulty sleeping  HEME/LYMPH: No easy bruising, or bleeding gums  ALLERGY AND IMMUNOLOGIC: No hives or eczema    Vital Signs Last 24 Hrs  T(C): 36.3 (12 Dec 2020 12:36), Max: 36.7 (11 Dec 2020 21:05)  T(F): 97.4 (12 Dec 2020 12:36), Max: 98 (11 Dec 2020 21:05)  HR: 89 (12 Dec 2020 12:36) (86 - 106)  BP: 124/70 (12 Dec 2020 12:36) (124/70 - 188/81)  BP(mean): --  RR: 18 (12 Dec 2020 12:36) (18 - 18)  SpO2: 93% (12 Dec 2020 12:36) (93% - 95%)    PHYSICAL EXAM:  GENERAL: NAD, well-groomed, well-developed  HEAD:  Atraumatic, Normocephalic  EYES: EOMI, PERRLA, conjunctiva and sclera clear  ENMT: No tonsillar erythema, exudates, or enlargement; Moist mucous membranes, Good dentition, No lesions  NECK: Supple, No JVD, Normal thyroid  NERVOUS SYSTEM:  Alert & Oriented X3, Good concentration; Motor Strength 5/5 B/L upper and lower extremities; DTRs 2+ intact and symmetric  CHEST/LUNG: Clear to auscultation bilaterally; No rales, rhonchi, wheezing, or rubs  HEART: Regular rate and rhythm; No murmurs, rubs, or gallops  ABDOMEN: Soft, Nontender, Nondistended; Bowel sounds present  EXTREMITIES:  2+ Peripheral Pulses, No clubbing, cyanosis, or edema  LYMPH: No lymphadenopathy noted  SKIN: No rashes or lesions    LABS:              CAPILLARY BLOOD GLUCOSE      POCT Blood Glucose.: 114 mg/dL (12 Dec 2020 16:43)  POCT Blood Glucose.: 302 mg/dL (12 Dec 2020 11:07)  POCT Blood Glucose.: 282 mg/dL (12 Dec 2020 07:41)  POCT Blood Glucose.: 164 mg/dL (11 Dec 2020 21:21)      RADIOLOGY & ADDITIONAL TESTS:    Notes Reviewed:  [x ] YES  [ ] NO    Care Discussed with Consultants/Other Providers [x ] YES  [ ] NO

## 2020-12-12 NOTE — PROGRESS NOTE ADULT - SUBJECTIVE AND OBJECTIVE BOX
Neurology follow up note    GINGER BRISENO85yFemale      Interval History:    Patient feels ok no new complaints.    MEDICATIONS    acetaminophen   Tablet .. 650 milliGRAM(s) Oral every 6 hours PRN  bisacodyl 5 milliGRAM(s) Oral every 12 hours PRN  ciprofloxacin     Tablet 500 milliGRAM(s) Oral every 12 hours  dextrose 40% Gel 15 Gram(s) Oral once  dextrose 50% Injectable 25 Gram(s) IV Push once  dextrose 50% Injectable 12.5 Gram(s) IV Push once  dextrose 50% Injectable 25 Gram(s) IV Push once  furosemide    Tablet 20 milliGRAM(s) Oral daily  glucagon  Injectable 1 milliGRAM(s) IntraMuscular once  glycerin Suppository - Adult 1 Suppository(s) Rectal daily  heparin   Injectable 5000 Unit(s) SubCutaneous every 8 hours  insulin glargine Injectable (LANTUS) 15 Unit(s) SubCutaneous every morning  insulin lispro (ADMELOG) corrective regimen sliding scale   SubCutaneous three times a day before meals  insulin lispro Injectable (ADMELOG) 5 Unit(s) SubCutaneous three times a day before meals  levothyroxine 112 MICROGram(s) Oral daily  lisinopril 40 milliGRAM(s) Oral daily  magnesium hydroxide Suspension 30 milliLiter(s) Oral daily PRN  metoprolol succinate  milliGRAM(s) Oral daily  polyethylene glycol 3350 17 Gram(s) Oral two times a day  psyllium Powder 1 Packet(s) Oral every 24 hours  senna 2 Tablet(s) Oral at bedtime  simvastatin 5 milliGRAM(s) Oral at bedtime  tamsulosin 0.8 milliGRAM(s) Oral at bedtime  traMADol 25 milliGRAM(s) Oral four times a day PRN  urea Oral Powder 15 Gram(s) Oral daily      Allergies    penicillin (Muscle Pain)    Intolerances            Vital Signs Last 24 Hrs  T(C): 36.7 (12 Dec 2020 05:25), Max: 36.7 (11 Dec 2020 21:05)  T(F): 98 (12 Dec 2020 05:25), Max: 98 (11 Dec 2020 21:05)  HR: 106 (12 Dec 2020 05:25) (86 - 106)  BP: 158/60 (12 Dec 2020 05:57) (128/77 - 188/81)  BP(mean): --  RR: 18 (12 Dec 2020 05:25) (18 - 20)  SpO2: 95% (12 Dec 2020 05:25) (95% - 97%)        REVIEW OF SYSTEMS:  Constitutional:  The patient denies fever, chills, night sweats.  Head:  No headaches.  Eyes:  No double vision or blurry vision.  Ears:  No ringing in the ears.  Neck:  No neck pain.  Cardiovascular:  No chest pain.  Respiratory:  No shortness of breath.  Abdomen:  No nausea, vomiting or abdominal pain.  Extremities/Neurological:  No numbness or tingling.  Musculoskeletal:  Positive pain of her left arm but she did sustain a fracture.    PHYSICAL EXAMINATION:  HEENT:  Head normocephalic, atraumatic.  Eyes:  No scleral icterus.  Ears:  Hearing bilaterally intact.  NECK:  Supple.  CARDIOVASCULAR:  S1 and S2 heard.  RESPIRATORY:  Good air entry bilaterally.  ABDOMEN:  Soft, nontender.  EXTREMITIES:  No clubbing or cyanosis were noted.  NEUROLOGIC:  The patient is awake, alert.  Location was home, year 2020 Recall was 0/3 within 5 minutes.  Was able to name simple objects.  Able to follow complex commands, took right hand and touched left ear.  Extraocular movements were intact.  Pupils equal, round, and reactive bilaterally, 3 mm to 2.  Speech was fluent.  Smile symmetric.  Motor:  Right upper 5/5, left upper secondary to fracture had significant pain, not tested, but distally was able to wiggle her fingers.  Bilateral lower extremities were 4/5.  Sensory:  Bilateral upper and lower intact to light touch.             LABS:  CBC Full  -  ( 11 Dec 2020 06:26 )  WBC Count : 12.69 K/uL  RBC Count : 3.50 M/uL  Hemoglobin : 9.6 g/dL  Hematocrit : 30.5 %  Platelet Count - Automated : 303 K/uL  Mean Cell Volume : 87.1 fl  Mean Cell Hemoglobin : 27.4 pg  Mean Cell Hemoglobin Concentration : 31.5 gm/dL  Auto Neutrophil # : x  Auto Lymphocyte # : x  Auto Monocyte # : x  Auto Eosinophil # : x  Auto Basophil # : x  Auto Neutrophil % : x  Auto Lymphocyte % : x  Auto Monocyte % : x  Auto Eosinophil % : x  Auto Basophil % : x            Hemoglobin A1C:       Vitamin B12         RADIOLOGY  ANALYSIS AND PLAN:  This is an 85-year-old with episode of altered mental status.    For episode of altered mental status, suspect most likely secondary to mild cognitive impairment becoming more prominent in the hospital setting, suspect less likely this is from a cerebrovascular accident, primary CNS event even though examination was limited to the left upper extremity.   mild cognitive impairment supportive treatment.    Recommend followup basic chemistries as needed.  For history of diabetes, strict control of blood sugars.  For history of hypertension, monitor systolic blood pressure.  For history of high cholesterol, continue the patient on statin.  Fall precaution.  Would recommend if possible please limit the use of pain medication  TSH noted adjustment of medications as needed   no new events      daughterMarleny, at [phone] [facility] She does understand while in the hospital setting may become more disoriented.    .Greater than 45 minutes of time was spent with the patient, plan of care, reviewing data, speaking to the family and  50% of the visit was spent counseling and/or coordinating care with multidisciplinary healthcare team        Greater than 45 minutes of time was spent with the patient, plan of care, reviewing data, speaking to the family and  50% of the visit was spent counseling and/or coordinating care with multidisciplinary healthcare team

## 2020-12-12 NOTE — CONSULT NOTE ADULT - PROBLEM SELECTOR PROBLEM 1
Closed fracture of proximal end of left humerus with routine healing, unspecified fracture morphology, subsequent encounter

## 2020-12-12 NOTE — CONSULT NOTE ADULT - PROBLEM SELECTOR PROBLEM 2
Closed fracture of ramus of left pubis, initial encounter
Fall at home, initial encounter
Primary hyperparathyroidism

## 2020-12-12 NOTE — PROGRESS NOTE ADULT - SUBJECTIVE AND OBJECTIVE BOX
Patient is a 85y old  Female who presents with a chief complaint of Pelvic and left humerus fracture (11 Dec 2020 08:48)      Subjective: Patient seen and examined at bedside. No acute events overnight.     PAIN: LUE pain  DYSPNEA: N	  NAUS/VOM: N	  SECRETIONS: N	  AGITATION: N    OTHER REVIEW OF SYSTEMS: negative      Vital Signs Last 24 Hrs  T(C): 36.3 (12 Dec 2020 12:36), Max: 36.7 (11 Dec 2020 21:05)  T(F): 97.4 (12 Dec 2020 12:36), Max: 98 (11 Dec 2020 21:05)  HR: 89 (12 Dec 2020 12:36) (86 - 106)  BP: 124/70 (12 Dec 2020 12:36) (124/70 - 188/81)  BP(mean): --  RR: 18 (12 Dec 2020 12:36) (18 - 18)  SpO2: 93% (12 Dec 2020 12:36) (93% - 95%)                                9.6    12.69 )-----------( 303      ( 11 Dec 2020 06:26 )             30.5       CAPILLARY BLOOD GLUCOSE      POCT Blood Glucose.: 302 mg/dL (12 Dec 2020 11:07)  POCT Blood Glucose.: 282 mg/dL (12 Dec 2020 07:41)  POCT Blood Glucose.: 164 mg/dL (11 Dec 2020 21:21)  POCT Blood Glucose.: 165 mg/dL (11 Dec 2020 16:22)              acetaminophen   Tablet .. 650 milliGRAM(s) Oral every 6 hours PRN  bisacodyl 5 milliGRAM(s) Oral every 12 hours PRN  ciprofloxacin     Tablet 500 milliGRAM(s) Oral every 12 hours  dextrose 40% Gel 15 Gram(s) Oral once  dextrose 50% Injectable 25 Gram(s) IV Push once  dextrose 50% Injectable 12.5 Gram(s) IV Push once  dextrose 50% Injectable 25 Gram(s) IV Push once  furosemide    Tablet 20 milliGRAM(s) Oral daily  glucagon  Injectable 1 milliGRAM(s) IntraMuscular once  glycerin Suppository - Adult 1 Suppository(s) Rectal daily  heparin   Injectable 5000 Unit(s) SubCutaneous every 8 hours  insulin glargine Injectable (LANTUS) 15 Unit(s) SubCutaneous every morning  insulin lispro (ADMELOG) corrective regimen sliding scale   SubCutaneous three times a day before meals  insulin lispro Injectable (ADMELOG) 5 Unit(s) SubCutaneous three times a day before meals  levothyroxine 112 MICROGram(s) Oral daily  lisinopril 40 milliGRAM(s) Oral daily  magnesium hydroxide Suspension 30 milliLiter(s) Oral daily PRN  metoprolol succinate  milliGRAM(s) Oral daily  polyethylene glycol 3350 17 Gram(s) Oral two times a day  psyllium Powder 1 Packet(s) Oral every 24 hours  senna 2 Tablet(s) Oral at bedtime  simvastatin 5 milliGRAM(s) Oral at bedtime  tamsulosin 0.8 milliGRAM(s) Oral at bedtime  traMADol 25 milliGRAM(s) Oral four times a day PRN  urea Oral Powder 15 Gram(s) Oral daily        GENERAL:  resting comfortably in NAD  HEENT:  NC/AT EOMI PERRL  NECK: supple no JVD  CVS:  +S1 S2 RRR  RESP: CTA B/L  GI:  soft NT/ND +BS  : no suprapubic tenderness  MUSC:  no lower extremity edema  PSYCH:  Alert, Calm, Cooperative   SKIN:  Warm, moist, no rashes   LYMPH: normal             IMAGING REVIEWED    ADVANCED DIRECTIVES:     FULL CODE        PSYCHOSOCIAL-SPIRITUAL ASSESSMENT:    _x__Reviewed     _x__Care  plan unchanged     ___Care plan adjusted as below    GOALS OF CARE DISCUSSION  	__x_Palliative care info/counseling provided	    ___Family meeting  	_x__Advanced Directives addressed	    _x__See previous Palliative Medicine Note    AGENCY CHOICE DISCUSSED:   ___HOSPICE   ___[facility]  ___OTHER:              > 50% OF THE TIME SPENT IN COUNSELING AND COORDINATING CARE 	    Minutes:      PROLONGED SERVICE             FACE TO FACE:    PT            PT & FAMILY	       Minutes:      Advance Care Planning Time:

## 2020-12-13 LAB
HCT VFR BLD CALC: 26.9 % — LOW (ref 34.5–45)
HGB BLD-MCNC: 8.5 G/DL — LOW (ref 11.5–15.5)
MCHC RBC-ENTMCNC: 27.9 PG — SIGNIFICANT CHANGE UP (ref 27–34)
MCHC RBC-ENTMCNC: 31.6 GM/DL — LOW (ref 32–36)
MCV RBC AUTO: 88.2 FL — SIGNIFICANT CHANGE UP (ref 80–100)
NRBC # BLD: 0 /100 WBCS — SIGNIFICANT CHANGE UP (ref 0–0)
PLATELET # BLD AUTO: 367 K/UL — SIGNIFICANT CHANGE UP (ref 150–400)
RBC # BLD: 3.05 M/UL — LOW (ref 3.8–5.2)
RBC # FLD: 14.5 % — SIGNIFICANT CHANGE UP (ref 10.3–14.5)
WBC # BLD: 12.62 K/UL — HIGH (ref 3.8–10.5)
WBC # FLD AUTO: 12.62 K/UL — HIGH (ref 3.8–10.5)

## 2020-12-13 RX ORDER — OXYCODONE HYDROCHLORIDE 5 MG/1
1 TABLET ORAL
Qty: 0 | Refills: 0 | DISCHARGE
Start: 2020-12-13

## 2020-12-13 RX ORDER — OXYCODONE HYDROCHLORIDE 5 MG/1
5 TABLET ORAL
Refills: 0 | Status: DISCONTINUED | OUTPATIENT
Start: 2020-12-13 | End: 2020-12-14

## 2020-12-13 RX ADMIN — Medication 1 PACKET(S): at 06:17

## 2020-12-13 RX ADMIN — TAMSULOSIN HYDROCHLORIDE 0.8 MILLIGRAM(S): 0.4 CAPSULE ORAL at 22:16

## 2020-12-13 RX ADMIN — LISINOPRIL 40 MILLIGRAM(S): 2.5 TABLET ORAL at 06:16

## 2020-12-13 RX ADMIN — TRAMADOL HYDROCHLORIDE 25 MILLIGRAM(S): 50 TABLET ORAL at 06:27

## 2020-12-13 RX ADMIN — POLYETHYLENE GLYCOL 3350 17 GRAM(S): 17 POWDER, FOR SOLUTION ORAL at 06:17

## 2020-12-13 RX ADMIN — Medication 112 MICROGRAM(S): at 06:16

## 2020-12-13 RX ADMIN — SIMVASTATIN 5 MILLIGRAM(S): 20 TABLET, FILM COATED ORAL at 22:16

## 2020-12-13 RX ADMIN — TRAMADOL HYDROCHLORIDE 25 MILLIGRAM(S): 50 TABLET ORAL at 07:15

## 2020-12-13 RX ADMIN — OXYCODONE HYDROCHLORIDE 5 MILLIGRAM(S): 5 TABLET ORAL at 11:33

## 2020-12-13 RX ADMIN — Medication 500 MILLIGRAM(S): at 06:16

## 2020-12-13 RX ADMIN — Medication 5 UNIT(S): at 08:20

## 2020-12-13 RX ADMIN — HEPARIN SODIUM 5000 UNIT(S): 5000 INJECTION INTRAVENOUS; SUBCUTANEOUS at 22:16

## 2020-12-13 RX ADMIN — SENNA PLUS 2 TABLET(S): 8.6 TABLET ORAL at 22:16

## 2020-12-13 RX ADMIN — Medication 5 UNIT(S): at 12:20

## 2020-12-13 RX ADMIN — HEPARIN SODIUM 5000 UNIT(S): 5000 INJECTION INTRAVENOUS; SUBCUTANEOUS at 14:53

## 2020-12-13 RX ADMIN — HEPARIN SODIUM 5000 UNIT(S): 5000 INJECTION INTRAVENOUS; SUBCUTANEOUS at 06:16

## 2020-12-13 RX ADMIN — Medication 500 MILLIGRAM(S): at 19:22

## 2020-12-13 RX ADMIN — Medication 4: at 08:18

## 2020-12-13 RX ADMIN — Medication 100 MILLIGRAM(S): at 06:17

## 2020-12-13 RX ADMIN — Medication 20 MILLIGRAM(S): at 06:16

## 2020-12-13 RX ADMIN — Medication 15 GRAM(S): at 12:55

## 2020-12-13 RX ADMIN — POLYETHYLENE GLYCOL 3350 17 GRAM(S): 17 POWDER, FOR SOLUTION ORAL at 19:21

## 2020-12-13 RX ADMIN — Medication 2: at 12:20

## 2020-12-13 RX ADMIN — INSULIN GLARGINE 15 UNIT(S): 100 INJECTION, SOLUTION SUBCUTANEOUS at 08:21

## 2020-12-13 RX ADMIN — OXYCODONE HYDROCHLORIDE 5 MILLIGRAM(S): 5 TABLET ORAL at 10:33

## 2020-12-13 NOTE — PROGRESS NOTE ADULT - ASSESSMENT
12.10.2020 This is an 85 F who came in s/p fall. Patient is independent of all ADLs and IADLs. She lives alone. She doesn't drive. Her daughter is her HCP. Patient is planning to go to acute rehab from here.    12.11.2020 Patient feels well. D/C planning to acute rehab.    12.12.2020 C/O LUE pain. To receive oxycodone. Also has tramadol prn. D/C planning to acute rehab on Monday.    12.13.2020 Seen by PMR. D/C planning to acute rehab in AM as per recommendations.

## 2020-12-13 NOTE — PROGRESS NOTE ADULT - SUBJECTIVE AND OBJECTIVE BOX
Patient is a 85y old  Female who presents with a chief complaint of Pelvic and left humerus fracture (11 Dec 2020 08:48)      Subjective: Patient seen and examined at bedside. No acute events overnight.     PAIN: LUE pain  DYSPNEA: N	  NAUS/VOM: N	  SECRETIONS: N	  AGITATION: N    OTHER REVIEW OF SYSTEMS: negative      Vital Signs Last 24 Hrs  T(C): 36.6 (13 Dec 2020 12:41), Max: 37.7 (13 Dec 2020 04:49)  T(F): 97.9 (13 Dec 2020 12:41), Max: 99.8 (13 Dec 2020 04:49)  HR: 92 (13 Dec 2020 12:41) (88 - 97)  BP: 101/63 (13 Dec 2020 12:41) (101/63 - 179/85)  BP(mean): --  RR: 16 (13 Dec 2020 12:41) (16 - 20)  SpO2: 93% (13 Dec 2020 12:41) (93% - 98%)                                8.5    12.62 )-----------( 367      ( 13 Dec 2020 10:23 )             26.9       CAPILLARY BLOOD GLUCOSE      POCT Blood Glucose.: 161 mg/dL (13 Dec 2020 11:28)  POCT Blood Glucose.: 212 mg/dL (13 Dec 2020 07:30)  POCT Blood Glucose.: 143 mg/dL (12 Dec 2020 21:47)              acetaminophen   Tablet .. 650 milliGRAM(s) Oral every 6 hours PRN  bisacodyl 5 milliGRAM(s) Oral every 12 hours PRN  ciprofloxacin     Tablet 500 milliGRAM(s) Oral every 12 hours  dextrose 40% Gel 15 Gram(s) Oral once  dextrose 50% Injectable 25 Gram(s) IV Push once  dextrose 50% Injectable 12.5 Gram(s) IV Push once  dextrose 50% Injectable 25 Gram(s) IV Push once  furosemide    Tablet 20 milliGRAM(s) Oral daily  glucagon  Injectable 1 milliGRAM(s) IntraMuscular once  heparin   Injectable 5000 Unit(s) SubCutaneous every 8 hours  insulin glargine Injectable (LANTUS) 15 Unit(s) SubCutaneous every morning  insulin lispro (ADMELOG) corrective regimen sliding scale   SubCutaneous three times a day before meals  insulin lispro Injectable (ADMELOG) 5 Unit(s) SubCutaneous three times a day before meals  levothyroxine 112 MICROGram(s) Oral daily  lisinopril 40 milliGRAM(s) Oral daily  magnesium hydroxide Suspension 30 milliLiter(s) Oral daily PRN  metoprolol succinate  milliGRAM(s) Oral daily  oxyCODONE    IR 5 milliGRAM(s) Oral four times a day PRN  polyethylene glycol 3350 17 Gram(s) Oral two times a day  psyllium Powder 1 Packet(s) Oral every 24 hours  senna 2 Tablet(s) Oral at bedtime  simvastatin 5 milliGRAM(s) Oral at bedtime  tamsulosin 0.8 milliGRAM(s) Oral at bedtime  traMADol 25 milliGRAM(s) Oral four times a day PRN  urea Oral Powder 15 Gram(s) Oral daily        GENERAL:  resting comfortably in NAD  HEENT:  NC/AT EOMI PERRL  NECK: supple no JVD  CVS:  +S1 S2 RRR  RESP: CTA B/L  GI:  soft NT/ND +BS  : no suprapubic tenderness  MUSC:  no lower extremity edema  PSYCH:  Alert, Calm, Cooperative   SKIN:  Warm, moist, no rashes   LYMPH: normal             IMAGING REVIEWED    ADVANCED DIRECTIVES:     FULL CODE        PSYCHOSOCIAL-SPIRITUAL ASSESSMENT:    _x__Reviewed     _x__Care  plan unchanged     ___Care plan adjusted as below    GOALS OF CARE DISCUSSION  	__x_Palliative care info/counseling provided	    ___Family meeting  	_x__Advanced Directives addressed	    _x__See previous Palliative Medicine Note    AGENCY CHOICE DISCUSSED:   ___HOSPICE   ___[facility]  ___OTHER:              > 50% OF THE TIME SPENT IN COUNSELING AND COORDINATING CARE 	    Minutes:      PROLONGED SERVICE             FACE TO FACE:    PT            PT & FAMILY	       Minutes:      Advance Care Planning Time:

## 2020-12-13 NOTE — CHART NOTE - NSCHARTNOTEFT_GEN_A_CORE
Do you have Advance Directives (HCP / LV / Organ donation / Documentation of oral advance Directive):   (  x  )  yes    (      )    NO                                                                            Do you have LV - Living will :                                                                                                                                             (    x)  yes    (      )   No    Do you have HCP - Health Care Proxy:                                                                                                                            (   x  )  yes   (       ) N0    Do you have DNR- Do Not Resuscitate :                                                                                                                           (      )  yes  (     x   )  No    Do you have DNI- Do Not intubate  :                                                                                                                               (      )  yes   (    x   ) No    Do you have MOLST - Medical orders for Life sustaining treatment  :                                                                    (      ) yes    (       ) No    Decision Maker :  (  x   ) Patient     (      )  HCA   (     ) Public Health Law Surrogate     (      ) Surrogate  (       ) Guardian    Goals of Care :  (  x    )   Complete Care     (       ) No Limitations                              (       )   Comfort Care       (       )  Hospice                               (      )   Limited medical Intervention / s    Medical Interventions :   (     x   )   CPR       (        )  DNR                                               (     x   )  Intubation with MV - Mechanical Ventilation  (    x  ) BIPAP/CPAP    (         )   DNI                                               (     x    )  Artificial Nutrition -  IVF, TPN / PPN, Tube Feeds             (         )   No Feeding Tube                                                (    x    ) Use Antibiotics                         (          ) No Antibiotics                                                (    x     ) Blood and Blood Products     (         )   No Blood or Blood products                                                (     x     )  Dialysis                                    (         )  No Dialysis                                                (          )  Medical Management only  (         )  No Invasive Interventions or Surgery  Time spent :                        (   x    ) upto 30 minutes                       (           )   more than 30 minutes  ACP reviewed and discussed

## 2020-12-13 NOTE — PROGRESS NOTE ADULT - SUBJECTIVE AND OBJECTIVE BOX
Patient is a 85y old  Female who presents with a chief complaint of Pelvic and left humerus fracture (13 Dec 2020 11:43)      INTERVAL /OVERNIGHT EVENTS: no new complaints, waiting for rehab    MEDICATIONS  (STANDING):  ciprofloxacin     Tablet 500 milliGRAM(s) Oral every 12 hours  dextrose 40% Gel 15 Gram(s) Oral once  dextrose 50% Injectable 25 Gram(s) IV Push once  dextrose 50% Injectable 12.5 Gram(s) IV Push once  dextrose 50% Injectable 25 Gram(s) IV Push once  furosemide    Tablet 20 milliGRAM(s) Oral daily  glucagon  Injectable 1 milliGRAM(s) IntraMuscular once  heparin   Injectable 5000 Unit(s) SubCutaneous every 8 hours  insulin glargine Injectable (LANTUS) 15 Unit(s) SubCutaneous every morning  insulin lispro (ADMELOG) corrective regimen sliding scale   SubCutaneous three times a day before meals  insulin lispro Injectable (ADMELOG) 5 Unit(s) SubCutaneous three times a day before meals  levothyroxine 112 MICROGram(s) Oral daily  lisinopril 40 milliGRAM(s) Oral daily  metoprolol succinate  milliGRAM(s) Oral daily  polyethylene glycol 3350 17 Gram(s) Oral two times a day  psyllium Powder 1 Packet(s) Oral every 24 hours  senna 2 Tablet(s) Oral at bedtime  simvastatin 5 milliGRAM(s) Oral at bedtime  tamsulosin 0.8 milliGRAM(s) Oral at bedtime  urea Oral Powder 15 Gram(s) Oral daily    MEDICATIONS  (PRN):  acetaminophen   Tablet .. 650 milliGRAM(s) Oral every 6 hours PRN Mild Pain (1 - 3)  bisacodyl 5 milliGRAM(s) Oral every 12 hours PRN Constipation  magnesium hydroxide Suspension 30 milliLiter(s) Oral daily PRN Constipation  oxyCODONE    IR 5 milliGRAM(s) Oral four times a day PRN Severe Pain (7 - 10)  traMADol 25 milliGRAM(s) Oral four times a day PRN Moderate Pain (4 - 6)      Allergies    penicillin (Muscle Pain)    Intolerances        REVIEW OF SYSTEMS:  CONSTITUTIONAL: No fever, weight loss, or fatigue  EYES: No eye pain, visual disturbances, or discharge  ENMT:  No difficulty hearing, tinnitus, vertigo; No sinus or throat pain  NECK: No pain or stiffness  RESPIRATORY: No cough, wheezing, chills or hemoptysis; No shortness of breath  CARDIOVASCULAR: No chest pain, palpitations, dizziness, or leg swelling  GASTROINTESTINAL: No abdominal or epigastric pain. No nausea, vomiting, or hematemesis; No diarrhea or constipation. No melena or hematochezia.  GENITOURINARY: No dysuria, frequency, hematuria, or incontinence  NEUROLOGICAL: No headaches, memory loss, loss of strength, numbness, or tremors  SKIN: No itching, burning, rashes, or lesions   LYMPH NODES: No enlarged glands  ENDOCRINE: No heat or cold intolerance; No hair loss; No polydipsia or polyuria  MUSCULOSKELETAL: + joint pain or swelling; No muscle, back, or extremity pain  PSYCHIATRIC: No depression, anxiety, mood swings, or difficulty sleeping  HEME/LYMPH: No easy bruising, or bleeding gums  ALLERGY AND IMMUNOLOGIC: No hives or eczema    Vital Signs Last 24 Hrs  T(C): 36.6 (13 Dec 2020 12:41), Max: 37.7 (13 Dec 2020 04:49)  T(F): 97.9 (13 Dec 2020 12:41), Max: 99.8 (13 Dec 2020 04:49)  HR: 92 (13 Dec 2020 12:41) (88 - 97)  BP: 101/63 (13 Dec 2020 12:41) (101/63 - 179/85)  BP(mean): --  RR: 16 (13 Dec 2020 12:41) (16 - 20)  SpO2: 93% (13 Dec 2020 12:41) (93% - 98%)    PHYSICAL EXAM:  GENERAL: NAD, well-groomed, well-developed  HEAD:  Atraumatic, Normocephalic  EYES: EOMI, PERRLA, conjunctiva and sclera clear  ENMT: No tonsillar erythema, exudates, or enlargement; Moist mucous membranes, Good dentition, No lesions  NECK: Supple, No JVD, Normal thyroid  NERVOUS SYSTEM:  Alert & Oriented X3, Good concentration; Motor Strength 5/5 B/L upper and lower extremities; DTRs 2+ intact and symmetric  CHEST/LUNG: Clear to auscultation bilaterally; No rales, rhonchi, wheezing, or rubs  HEART: Regular rate and rhythm; No murmurs, rubs, or gallops  ABDOMEN: Soft, Nontender, Nondistended; Bowel sounds present  EXTREMITIES:  2+ Peripheral Pulses, No clubbing, cyanosis, or edema  LYMPH: No lymphadenopathy noted  SKIN: No rashes or lesions    LABS:                        8.5    12.62 )-----------( 367      ( 13 Dec 2020 10:23 )             26.9               CAPILLARY BLOOD GLUCOSE      POCT Blood Glucose.: 87 mg/dL (13 Dec 2020 16:48)  POCT Blood Glucose.: 161 mg/dL (13 Dec 2020 11:28)  POCT Blood Glucose.: 212 mg/dL (13 Dec 2020 07:30)  POCT Blood Glucose.: 143 mg/dL (12 Dec 2020 21:47)      RADIOLOGY & ADDITIONAL TESTS:    Notes Reviewed:  [x ] YES  [ ] NO    Care Discussed with Consultants/Other Providers [x ] YES  [ ] NO

## 2020-12-13 NOTE — PROGRESS NOTE ADULT - SUBJECTIVE AND OBJECTIVE BOX
Neurology follow up note    GINGER BRISENO85yFemale      Interval History:    Patient feels ok no new complaints.    MEDICATIONS    acetaminophen   Tablet .. 650 milliGRAM(s) Oral every 6 hours PRN  bisacodyl 5 milliGRAM(s) Oral every 12 hours PRN  ciprofloxacin     Tablet 500 milliGRAM(s) Oral every 12 hours  dextrose 40% Gel 15 Gram(s) Oral once  dextrose 50% Injectable 25 Gram(s) IV Push once  dextrose 50% Injectable 12.5 Gram(s) IV Push once  dextrose 50% Injectable 25 Gram(s) IV Push once  furosemide    Tablet 20 milliGRAM(s) Oral daily  glucagon  Injectable 1 milliGRAM(s) IntraMuscular once  heparin   Injectable 5000 Unit(s) SubCutaneous every 8 hours  insulin glargine Injectable (LANTUS) 15 Unit(s) SubCutaneous every morning  insulin lispro (ADMELOG) corrective regimen sliding scale   SubCutaneous three times a day before meals  insulin lispro Injectable (ADMELOG) 5 Unit(s) SubCutaneous three times a day before meals  levothyroxine 112 MICROGram(s) Oral daily  lisinopril 40 milliGRAM(s) Oral daily  magnesium hydroxide Suspension 30 milliLiter(s) Oral daily PRN  metoprolol succinate  milliGRAM(s) Oral daily  oxyCODONE    IR 5 milliGRAM(s) Oral four times a day PRN  polyethylene glycol 3350 17 Gram(s) Oral two times a day  psyllium Powder 1 Packet(s) Oral every 24 hours  senna 2 Tablet(s) Oral at bedtime  simvastatin 5 milliGRAM(s) Oral at bedtime  tamsulosin 0.8 milliGRAM(s) Oral at bedtime  traMADol 25 milliGRAM(s) Oral four times a day PRN  urea Oral Powder 15 Gram(s) Oral daily      Allergies    penicillin (Muscle Pain)    Intolerances            Vital Signs Last 24 Hrs  T(C): 37.7 (13 Dec 2020 04:49), Max: 37.7 (13 Dec 2020 04:49)  T(F): 99.8 (13 Dec 2020 04:49), Max: 99.8 (13 Dec 2020 04:49)  HR: 88 (13 Dec 2020 04:49) (88 - 97)  BP: 146/78 (13 Dec 2020 04:49) (117/68 - 179/85)  BP(mean): --  RR: 18 (13 Dec 2020 04:49) (18 - 20)  SpO2: 98% (13 Dec 2020 04:49) (93% - 98%)        REVIEW OF SYSTEMS:  Constitutional:  The patient denies fever, chills, night sweats.  Head:  No headaches.  Eyes:  No double vision or blurry vision.  Ears:  No ringing in the ears.  Neck:  No neck pain.  Cardiovascular:  No chest pain.  Respiratory:  No shortness of breath.  Abdomen:  No nausea, vomiting or abdominal pain.  Extremities/Neurological:  No numbness or tingling.  Musculoskeletal:  Positive pain of her left arm but she did sustain a fracture.    PHYSICAL EXAMINATION:  HEENT:  Head normocephalic, atraumatic.  Eyes:  No scleral icterus.  Ears:  Hearing bilaterally intact.  NECK:  Supple.  CARDIOVASCULAR:  S1 and S2 heard.  RESPIRATORY:  Good air entry bilaterally.  ABDOMEN:  Soft, nontender.  EXTREMITIES:  No clubbing or cyanosis were noted.  NEUROLOGIC:  The patient is awake, alert.  Location was home, year 2020 Recall was 0/3 within 5 minutes.  Was able to name simple objects.  Able to follow complex commands, took right hand and touched left ear.  Extraocular movements were intact.  Pupils equal, round, and reactive bilaterally, 3 mm to 2.  Speech was fluent.  Smile symmetric.  Motor:  Right upper 5/5, left upper secondary to fracture had significant pain, not tested, but distally was able to wiggle her fingers.  Bilateral lower extremities were 4/5.  Sensory:  Bilateral upper and lower intact to light touch.                LABS:  CBC Full  -  ( 13 Dec 2020 10:23 )  WBC Count : 12.62 K/uL  RBC Count : 3.05 M/uL  Hemoglobin : 8.5 g/dL  Hematocrit : 26.9 %  Platelet Count - Automated : 367 K/uL  Mean Cell Volume : 88.2 fl  Mean Cell Hemoglobin : 27.9 pg  Mean Cell Hemoglobin Concentration : 31.6 gm/dL  Auto Neutrophil # : x  Auto Lymphocyte # : x  Auto Monocyte # : x  Auto Eosinophil # : x  Auto Basophil # : x  Auto Neutrophil % : x  Auto Lymphocyte % : x  Auto Monocyte % : x  Auto Eosinophil % : x  Auto Basophil % : x            Hemoglobin A1C:       Vitamin B12         RADIOLOGY      ANALYSIS AND PLAN:  This is an 85-year-old with episode of altered mental status.    For episode of altered mental status, suspect most likely secondary to mild cognitive impairment becoming more prominent in the hospital setting, suspect less likely this is from a cerebrovascular accident, primary CNS event even though examination was limited to the left upper extremity.   mild cognitive impairment supportive treatment.    For history of diabetes, strict control of blood sugars.  For history of hypertension, monitor systolic blood pressure.  For history of high cholesterol, continue the patient on statin.  Fall precaution.  Would recommend if possible please limit the use of pain medication  TSH noted adjustment of medications as needed   no new events      daughterMarleny, at [phone] [facility] She does understand while in the hospital setting may become more disoriented.    .Greater than 45 minutes of time was spent with the patient, plan of care, reviewing data, speaking to the family and  50% of the visit was spent counseling and/or coordinating care with multidisciplinary healthcare team

## 2020-12-14 VITALS
HEART RATE: 91 BPM | DIASTOLIC BLOOD PRESSURE: 68 MMHG | RESPIRATION RATE: 16 BRPM | TEMPERATURE: 98 F | SYSTOLIC BLOOD PRESSURE: 107 MMHG | OXYGEN SATURATION: 96 %

## 2020-12-14 DIAGNOSIS — N39.0 URINARY TRACT INFECTION, SITE NOT SPECIFIED: ICD-10-CM

## 2020-12-14 LAB — SARS-COV-2 RNA SPEC QL NAA+PROBE: SIGNIFICANT CHANGE UP

## 2020-12-14 RX ADMIN — INSULIN GLARGINE 15 UNIT(S): 100 INJECTION, SOLUTION SUBCUTANEOUS at 08:01

## 2020-12-14 RX ADMIN — Medication 5 UNIT(S): at 08:01

## 2020-12-14 RX ADMIN — OXYCODONE HYDROCHLORIDE 5 MILLIGRAM(S): 5 TABLET ORAL at 06:04

## 2020-12-14 RX ADMIN — POLYETHYLENE GLYCOL 3350 17 GRAM(S): 17 POWDER, FOR SOLUTION ORAL at 05:53

## 2020-12-14 RX ADMIN — Medication 2: at 17:05

## 2020-12-14 RX ADMIN — Medication 20 MILLIGRAM(S): at 05:53

## 2020-12-14 RX ADMIN — LISINOPRIL 40 MILLIGRAM(S): 2.5 TABLET ORAL at 05:53

## 2020-12-14 RX ADMIN — Medication 1 PACKET(S): at 05:53

## 2020-12-14 RX ADMIN — Medication 2: at 11:53

## 2020-12-14 RX ADMIN — Medication 5 UNIT(S): at 11:52

## 2020-12-14 RX ADMIN — OXYCODONE HYDROCHLORIDE 5 MILLIGRAM(S): 5 TABLET ORAL at 14:14

## 2020-12-14 RX ADMIN — Medication 15 GRAM(S): at 11:52

## 2020-12-14 RX ADMIN — Medication 5 UNIT(S): at 17:05

## 2020-12-14 RX ADMIN — Medication 100 MILLIGRAM(S): at 05:53

## 2020-12-14 RX ADMIN — Medication 112 MICROGRAM(S): at 05:53

## 2020-12-14 RX ADMIN — MAGNESIUM HYDROXIDE 30 MILLILITER(S): 400 TABLET, CHEWABLE ORAL at 13:40

## 2020-12-14 RX ADMIN — Medication 500 MILLIGRAM(S): at 17:05

## 2020-12-14 RX ADMIN — Medication 500 MILLIGRAM(S): at 05:52

## 2020-12-14 RX ADMIN — OXYCODONE HYDROCHLORIDE 5 MILLIGRAM(S): 5 TABLET ORAL at 15:14

## 2020-12-14 RX ADMIN — POLYETHYLENE GLYCOL 3350 17 GRAM(S): 17 POWDER, FOR SOLUTION ORAL at 17:05

## 2020-12-14 RX ADMIN — HEPARIN SODIUM 5000 UNIT(S): 5000 INJECTION INTRAVENOUS; SUBCUTANEOUS at 05:52

## 2020-12-14 RX ADMIN — Medication 6: at 08:01

## 2020-12-14 RX ADMIN — HEPARIN SODIUM 5000 UNIT(S): 5000 INJECTION INTRAVENOUS; SUBCUTANEOUS at 13:41

## 2020-12-14 NOTE — PROGRESS NOTE ADULT - REASON FOR ADMISSION
Pelvic and left humerus fracture

## 2020-12-14 NOTE — PROGRESS NOTE ADULT - SUBJECTIVE AND OBJECTIVE BOX
[facility], Division of Infectious Diseases  MATILDE Cavanaugh Y. Patel, S. Shah  [phone]  ([phone] - weekdays after 5pm and weekends)    Name: GINGER BRISENO  Age/Gender: 85y Female  MRN: [id number]    Interval History:  Patient reports feeling well, no new complaints this morning.   Denies fever, chills, chest pain, dyspnea, cough, abd pain, n/v/d, dysuria.  ROS reviewed, pertinent positives and negatives as above.   Notes reviewed. Afebrile.     Allergies: penicillin (Muscle Pain)    Objective:  Vitals:   T(F): 98.5 (12-14-20 @ 04:54), Max: 98.6 (12-13-20 @ 22:01)  HR: 90 (12-14-20 @ 04:54) (88 - 92)  BP: 154/70 (12-14-20 @ 05:54) (101/63 - 162/77)  RR: 18 (12-14-20 @ 04:54) (16 - 18)  SpO2: 92% (12-14-20 @ 04:54) (92% - 95%)    Physical Examination:  General: no acute distress, nontoxic appearing  HEENT: NC/AT, EOMI, anicteric, neck supple  Cardio: S1, S2 heard, RRR, no murmurs  Resp: CTA bilaterally, no rales/wheezes/rhonchi  Abd: soft, NT, ND, + BS  Neuro: AAOx3, no obvious focal deficits  Ext: LUE in cast, no edema or cyanosis  Skin: warm, dry, no visible rash  Psych: appropriate affect and mood for situation    Laboratory Studies:  CBC:                       8.5    12.62 )-----------( 367      ( 13 Dec 2020 10:23 )             26.9     CMP:     Microbiology:  12/13 - COVID-19 PCR - negative  12/9 - COVID-19 PCR - negative  12/9 - COVID-19 PCR - negative  12/7 - urine culture - E.coli, pansensitive  -  Amikacin: S <=16      -  Amoxicillin/Clavulanic Acid: S <=8/4      -  Ampicillin: S <=8 These ampicillin results predict results for amoxicillin      -  Ampicillin/Sulbactam: S <=4/2 Enterobacter, Citrobacter, and Serratia may develop resistance during prolonged therapy (3-4 days)      -  Aztreonam: S <=4      -  Cefazolin: S <=2 ([id number]) For uncomplicated UTI with K. pneumoniae, E. coli, or P. mirablis: ASHLEY <=16 is sensitive and ASHLEY >=32 is resistant. This also predicts results for oral agents cefaclor, cefdinir, cefpodoxime, cefprozil, cefuroxime axetil, cephalexin and locarbef for uncomplicated UTI. Note that some isolates may be susceptible to these agents while testing resistant to cefazolin.      -  Cefepime: S <=2      -  Cefoxitin: S <=8      -  Ceftriaxone: S <=1 Enterobacter, Citrobacter, and Serratia may develop resistance during prolonged therapy      -  Ciprofloxacin: S <=0.25      -  Ertapenem: S <=0.5      -  Gentamicin: S <=2      -  Imipenem: S <=1      -  Levofloxacin: S <=0.5      -  Meropenem: S <=1      -  Nitrofurantoin: S <=32 Should not be used to treat pyelonephritis      -  Piperacillin/Tazobactam: S <=8      -  Tigecycline: S <=2      -  Tobramycin: S <=2      -  Trimethoprim/Sulfamethoxazole: S <=0.5/9.5  12/7 - COVID-19 ab - negative  12/6 - COVID-19 PCR - negative        Radiology:  US Urinary Bladder (12.11.20 @ 08:07) > Bladder ultrasound. Bladder distends up to 707 cc. No wall thickening or abnormal intraluminal echoes. The patient did not void at the time of the examination. Impression: As above  Xray Shoulder 2 Views, Left (12.10.20 @ 09:40) > IMPRESSION: Acute fracture of the left humeral neck without further angulation.  Xray Wrist Post Reduction AP/Lat, Left (12.08.20 @ 08:25) > IMPRESSION: AP and lateral radiographs of the left wrist were obtained in a cast. Casting partially obscures osseous detail. A transverse distal radial neck fracture is partially visualized and is adequately aligned. There is a dorsal comminuted fragments. Previously seen ulnar styloid fracture is not well visualized.    Medications:  MEDICATIONS  (STANDING):  ciprofloxacin     Tablet 500 milliGRAM(s) Oral every 12 hours  dextrose 40% Gel 15 Gram(s) Oral once  dextrose 50% Injectable 25 Gram(s) IV Push once  dextrose 50% Injectable 12.5 Gram(s) IV Push once  dextrose 50% Injectable 25 Gram(s) IV Push once  furosemide    Tablet 20 milliGRAM(s) Oral daily  glucagon  Injectable 1 milliGRAM(s) IntraMuscular once  heparin   Injectable 5000 Unit(s) SubCutaneous every 8 hours  insulin glargine Injectable (LANTUS) 15 Unit(s) SubCutaneous every morning  insulin lispro (ADMELOG) corrective regimen sliding scale   SubCutaneous three times a day before meals  insulin lispro Injectable (ADMELOG) 5 Unit(s) SubCutaneous three times a day before meals  levothyroxine 112 MICROGram(s) Oral daily  lisinopril 40 milliGRAM(s) Oral daily  metoprolol succinate  milliGRAM(s) Oral daily  polyethylene glycol 3350 17 Gram(s) Oral two times a day  psyllium Powder 1 Packet(s) Oral every 24 hours  senna 2 Tablet(s) Oral at bedtime  simvastatin 5 milliGRAM(s) Oral at bedtime  tamsulosin 0.8 milliGRAM(s) Oral at bedtime  urea Oral Powder 15 Gram(s) Oral daily    Antibiotics:  ciprofloxacin     Tablet 500 milliGRAM(s) Oral every 12 hours - started 12/10  s/p ertapenem 12/6

## 2020-12-14 NOTE — PROGRESS NOTE ADULT - SUBJECTIVE AND OBJECTIVE BOX
CAPILLARY BLOOD GLUCOSE      POCT Blood Glucose.: 251 mg/dL (14 Dec 2020 07:56)  POCT Blood Glucose.: 194 mg/dL (13 Dec 2020 21:40)  POCT Blood Glucose.: 87 mg/dL (13 Dec 2020 16:48)  POCT Blood Glucose.: 161 mg/dL (13 Dec 2020 11:28)      Vital Signs Last 24 Hrs  T(C): 36.9 (14 Dec 2020 04:54), Max: 37 (13 Dec 2020 22:01)  T(F): 98.5 (14 Dec 2020 04:54), Max: 98.6 (13 Dec 2020 22:01)  HR: 90 (14 Dec 2020 04:54) (88 - 92)  BP: 154/70 (14 Dec 2020 05:54) (101/63 - 162/77)  BP(mean): --  RR: 18 (14 Dec 2020 04:54) (16 - 18)  SpO2: 92% (14 Dec 2020 04:54) (92% - 95%)    General: WN/WD NAD  Respiratory: CTA B/L  CV: RRR, S1S2, no murmurs, rubs or gallops  Abdominal: Soft, NT, ND +BS, Last BM  Extremities: No edema, + peripheral pulses             dextrose 40% Gel 15 Gram(s) Oral once  dextrose 50% Injectable 25 Gram(s) IV Push once  dextrose 50% Injectable 12.5 Gram(s) IV Push once  dextrose 50% Injectable 25 Gram(s) IV Push once  glucagon  Injectable 1 milliGRAM(s) IntraMuscular once  insulin glargine Injectable (LANTUS) 15 Unit(s) SubCutaneous every morning  insulin lispro (ADMELOG) corrective regimen sliding scale   SubCutaneous three times a day before meals  insulin lispro Injectable (ADMELOG) 5 Unit(s) SubCutaneous three times a day before meals  levothyroxine 112 MICROGram(s) Oral daily  simvastatin 5 milliGRAM(s) Oral at bedtime

## 2020-12-14 NOTE — PROGRESS NOTE ADULT - PROBLEM SELECTOR PROBLEM 2
Closed fracture of ramus of left pubis, initial encounter
Fall at home, initial encounter

## 2020-12-14 NOTE — PROGRESS NOTE ADULT - PROBLEM SELECTOR PLAN 2
Orthopedics consult appreciated,   conservative management
Mechanical fall at home - now with left distal radius fracture, left proximal humerus fracture and LC1 pelvic fracture. Orthopedics following, patient would like nonoperative management at this time.

## 2020-12-14 NOTE — PROGRESS NOTE ADULT - ATTENDING COMMENTS
ID will sign off at this time but remains available for any further questions/concerns.     Michael Rodríguez M.D.  [facility], Division of Infectious Diseases  [phone]  After 5pm on weekdays and all day on weekends - please call [phone]
Michael Rodríguez M.D.  [facility], Division of Infectious Diseases  [phone]  After 5pm on weekdays and all day on weekends - please call [phone]
Over the weekend, Dr. Wells will be covering for our group.    Michael Rodríguez M.D.  [facility], Division of Infectious Diseases  [phone]  After 5pm on weekdays and all day on weekends - please call [phone]
d/c to acute rehab today

## 2020-12-14 NOTE — PROGRESS NOTE ADULT - PROBLEM SELECTOR PROBLEM 4
Primary hyperparathyroidism
Diabetes mellitus

## 2020-12-14 NOTE — PROGRESS NOTE ADULT - PROBLEM SELECTOR PROBLEM 1
Diabetes mellitus
Diabetes mellitus
Diabetes mellitus type 2, uncontrolled
Fall at home, initial encounter
Asymptomatic bacteriuria
Asymptomatic bacteriuria
UTI (urinary tract infection)

## 2020-12-14 NOTE — PROGRESS NOTE ADULT - SUBJECTIVE AND OBJECTIVE BOX
Neurology follow up note    GINGER BRISENO85yFemale      Interval History:    Patient feels ok no new complaints.    MEDICATIONS    acetaminophen   Tablet .. 650 milliGRAM(s) Oral every 6 hours PRN  bisacodyl 5 milliGRAM(s) Oral every 12 hours PRN  ciprofloxacin     Tablet 500 milliGRAM(s) Oral every 12 hours  dextrose 40% Gel 15 Gram(s) Oral once  dextrose 50% Injectable 25 Gram(s) IV Push once  dextrose 50% Injectable 12.5 Gram(s) IV Push once  dextrose 50% Injectable 25 Gram(s) IV Push once  furosemide    Tablet 20 milliGRAM(s) Oral daily  glucagon  Injectable 1 milliGRAM(s) IntraMuscular once  heparin   Injectable 5000 Unit(s) SubCutaneous every 8 hours  insulin glargine Injectable (LANTUS) 15 Unit(s) SubCutaneous every morning  insulin lispro (ADMELOG) corrective regimen sliding scale   SubCutaneous three times a day before meals  insulin lispro Injectable (ADMELOG) 5 Unit(s) SubCutaneous three times a day before meals  levothyroxine 112 MICROGram(s) Oral daily  lisinopril 40 milliGRAM(s) Oral daily  magnesium hydroxide Suspension 30 milliLiter(s) Oral daily PRN  metoprolol succinate  milliGRAM(s) Oral daily  oxyCODONE    IR 5 milliGRAM(s) Oral four times a day PRN  polyethylene glycol 3350 17 Gram(s) Oral two times a day  psyllium Powder 1 Packet(s) Oral every 24 hours  senna 2 Tablet(s) Oral at bedtime  simvastatin 5 milliGRAM(s) Oral at bedtime  tamsulosin 0.8 milliGRAM(s) Oral at bedtime  urea Oral Powder 15 Gram(s) Oral daily      Allergies    penicillin (Muscle Pain)    Intolerances            Vital Signs Last 24 Hrs  T(C): 36.9 (14 Dec 2020 04:54), Max: 37 (13 Dec 2020 22:01)  T(F): 98.5 (14 Dec 2020 04:54), Max: 98.6 (13 Dec 2020 22:01)  HR: 81 (14 Dec 2020 11:20) (81 - 92)  BP: 145/70 (14 Dec 2020 11:20) (101/63 - 162/77)  BP(mean): --  RR: 18 (14 Dec 2020 04:54) (16 - 18)  SpO2: 95% (14 Dec 2020 11:20) (92% - 95%)      REVIEW OF SYSTEMS:  Constitutional:  The patient denies fever, chills, night sweats.  Head:  No headaches.  Eyes:  No double vision or blurry vision.  Ears:  No ringing in the ears.  Neck:  No neck pain.  Cardiovascular:  No chest pain.  Respiratory:  No shortness of breath.  Abdomen:  No nausea, vomiting or abdominal pain.  Extremities/Neurological:  No numbness or tingling.  Musculoskeletal:  Positive pain of her left arm but she did sustain a fracture.    PHYSICAL EXAMINATION:  HEENT:  Head normocephalic, atraumatic.  Eyes:  No scleral icterus.  Ears:  Hearing bilaterally intact.  NECK:  Supple.  CARDIOVASCULAR:  S1 and S2 heard.  RESPIRATORY:  Good air entry bilaterally.  ABDOMEN:  Soft, nontender.  EXTREMITIES:  No clubbing or cyanosis were noted.  NEUROLOGIC:  The patient is awake, alert.  Location was home, year 2020 Recall was 0/3 within 5 minutes.  Was able to name simple objects.  Able to follow complex commands, took right hand and touched left ear.  Extraocular movements were intact.  Pupils equal, round, and reactive bilaterally, 3 mm to 2.  Speech was fluent.  Smile symmetric.  Motor:  Right upper 5/5, left upper secondary to fracture had significant pain, not tested, but distally was able to wiggle her fingers.  Bilateral lower extremities were 4/5.  Sensory:  Bilateral upper and lower intact to light touch.                   LABS:  CBC Full  -  ( 13 Dec 2020 10:23 )  WBC Count : 12.62 K/uL  RBC Count : 3.05 M/uL  Hemoglobin : 8.5 g/dL  Hematocrit : 26.9 %  Platelet Count - Automated : 367 K/uL  Mean Cell Volume : 88.2 fl  Mean Cell Hemoglobin : 27.9 pg  Mean Cell Hemoglobin Concentration : 31.6 gm/dL  Auto Neutrophil # : x  Auto Lymphocyte # : x  Auto Monocyte # : x  Auto Eosinophil # : x  Auto Basophil # : x  Auto Neutrophil % : x  Auto Lymphocyte % : x  Auto Monocyte % : x  Auto Eosinophil % : x  Auto Basophil % : x            Hemoglobin A1C:       Vitamin B12         RADIOLOGY        ANALYSIS AND PLAN:  This is an 85-year-old with episode of altered mental status.    For episode of altered mental status, suspect most likely secondary to mild cognitive impairment becoming more prominent in the hospital setting, suspect less likely this is from a cerebrovascular accident, primary CNS event even though examination was limited to the left upper extremity.   mild cognitive impairment supportive treatment.    For history of diabetes, strict control of blood sugars.  For history of hypertension, monitor systolic blood pressure.  For history of high cholesterol, continue the patient on statin.  Fall precaution.  Would recommend if possible please limit the use of pain medication  TSH noted adjustment of medications as needed   no new events      daughterMarleny, at [phone]  12/14/2020 She does understand while in the hospital setting may become more disoriented.    .Greater than 45 minutes of time was spent with the patient, plan of care, reviewing data, speaking to the family and  50% of the visit was spent counseling and/or coordinating care with multidisciplinary healthcare team

## 2020-12-14 NOTE — PROGRESS NOTE ADULT - PROBLEM SELECTOR PLAN 3
Arm sling   ortho consult  non surgical
Arm sling   ortho consult noted  non surgical
Arm sling and ortho consult
Arm sling and ortho consult  non surgical
may be reactive in setting of above fall and fractures vs possible UTI.   Afebrile. WBC ~12k and stable.
may be reactive in setting of above fall and fractures vs possible UTI.   WBC ~12k today. Afebrile.   Continue to monitor white count and for fever
may be reactive in setting of above fall and fractures vs possible UTI.   WBC ~15k today. Afebrile.   Continue to monitor white count and for fever

## 2020-12-14 NOTE — PROGRESS NOTE ADULT - PROBLEM SELECTOR PLAN 1
Pain management
Pain management  PT eval recommend THEA     Asymptomatic Bacteruria- Monitor off abx as per ID.
Pain management  PT eval recommend THEA   family wants acute rehab
Pain management  PT eval recommend THEA   family wants acute rehab
Pain management  PT eval recommend THEA   family wants acute rehab  Asymptomatic Bacteruria- Monitor off abx as per ID.
Pain management  PT eval recommend THEA   family wants acute rehab, physiatry eval
Pain management  PT eval recommend THEA   family wants acute rehab, physiatry eval appreciated
Pain management  family wants acute rehab, physiatry eval appreciated
add lantus 10 units qam  cont admelog scale coverage qac/qhs  cont cons cho diet  goal bg 100-180 in hosp setting
cont lantus 10 units qhs  cont admelog mod dose scale coverage qac/qhs  cont cons cho diet  goal bg 100-180 in hosp setting
cont lantus 10 units/day  increase mod dose admelog scale coverage qac/qhs  cont cons cho diet  goal bg 100-180 in hosp setting
cont lantus 15 units qam  cont admelog 5 units 3x/day before meals  cont mod dose admelog scale coverage qac/qhs  cont cons cho diet  goal bg 100-180 in hosp setting
cont lantus 15 units qhs  cont admelog 5 units 3x/day before meals  cont mod dose admelog scale coverage qac/qhs  cont cons cho diet  goal bg 100-180 in hosp setting
increase lantus 15 units qam  cont admelog 3 units 3x/day before meals  cont mod dose admelog scale coverage qac/qhs  goal bg 100-180 in hosp setting
increase lantus 15 units qam  increase admelog 5 units 3x/day before meals  cont mod dose admelog scale coverage qac/qhs  cont cons cho diet  goal bg 100-180 in hosp setting
Positive urinalysis with 11-25 WBC, positive nitrite, small LE and many bacteria  Patient did not have any urinary symptoms prior to admission. S/p ertapenem 12/6  Patient oriented x3, continues to deny any urinary complaints. No fever/chills.   Urine culture grew pansensitive E. coli.   Will treat as UTI as patient reported to have episodes of confusion and has leukocytosis.     Continue Ciprofloxacin 500mg PO BID for total 5 days - end date 12/14
Positive urinalysis with 11-25 WBC, positive nitrite, small LE and many bacteria  Patient did not have any urinary symptoms prior to admission. S/p ertapenem 12/6  Patient oriented x3, continues to deny any urinary complaints. No fever/chills.   Urine culture grew pansensitive E. coli.   Will treat as UTI as patient reported to have episodes of confusion and has leukocytosis.     Start on Ciprofloxacin 500mg PO BID for 5 days - end date 12/14
Urinalysis with 11-25 WBC, positive nitrite, small LE and many bacteria  Patient did not have any urinary symptoms prior to admission. S/p ertapenem 12/6  Patient oriented x3, continues to deny any urinary complaints. No fever/chills.   Urine culture grew pansensitive E. coli.   Will treat as UTI as patient reported to have episodes of confusion and has leukocytosis. Leukocytosis trended down to ~12k, stable.     Continue Ciprofloxacin 500mg PO BID for total 5 days - end today 12/14.

## 2020-12-14 NOTE — CHART NOTE - NSCHARTNOTEFT_GEN_A_CORE
Assessment/Follow up: Pt awake/alert, however forgetful at times during visit. Continues on consistent carbohydrate diet. Tolerating well however reports only fair appetite. Minimal po intake documented in EMR. 85% documented 12/13. Per lunch plate waste observation 12/14 ~50% consumed. No report N/V. Severe constipation. Pt states no BM > 1 week. Multiple stool softeners/laxatives rx. Encourage po fluids/dietary fiber. Fluctuating BS. Accuchecks(12/13)212, 161,87,194 (12/14)251, 168. Endo following. Currently on 15units lantus qam    Factors impacting intake: [ ] none [ ] nausea  [ ] vomiting [ ] diarrhea [ ] constipation  [ ]chewing problems [ ] swallowing issues  [ ] other:     Diet Presciption: Diet, Consistent Carbohydrate/No Snacks (12-06-20 @ 17:21)    Intake:     Current Weight:   % Weight Change    Pertinent Medications: MEDICATIONS  (STANDING):  ciprofloxacin     Tablet 500 milliGRAM(s) Oral every 12 hours  dextrose 40% Gel 15 Gram(s) Oral once  dextrose 50% Injectable 25 Gram(s) IV Push once  dextrose 50% Injectable 12.5 Gram(s) IV Push once  dextrose 50% Injectable 25 Gram(s) IV Push once  furosemide    Tablet 20 milliGRAM(s) Oral daily  glucagon  Injectable 1 milliGRAM(s) IntraMuscular once  heparin   Injectable 5000 Unit(s) SubCutaneous every 8 hours  insulin glargine Injectable (LANTUS) 15 Unit(s) SubCutaneous every morning  insulin lispro (ADMELOG) corrective regimen sliding scale   SubCutaneous three times a day before meals  insulin lispro Injectable (ADMELOG) 5 Unit(s) SubCutaneous three times a day before meals  levothyroxine 112 MICROGram(s) Oral daily  lisinopril 40 milliGRAM(s) Oral daily  metoprolol succinate  milliGRAM(s) Oral daily  polyethylene glycol 3350 17 Gram(s) Oral two times a day  psyllium Powder 1 Packet(s) Oral every 24 hours  senna 2 Tablet(s) Oral at bedtime  simvastatin 5 milliGRAM(s) Oral at bedtime  tamsulosin 0.8 milliGRAM(s) Oral at bedtime  urea Oral Powder 15 Gram(s) Oral daily    MEDICATIONS  (PRN):  acetaminophen   Tablet .. 650 milliGRAM(s) Oral every 6 hours PRN Mild Pain (1 - 3)  bisacodyl 5 milliGRAM(s) Oral every 12 hours PRN Constipation  magnesium hydroxide Suspension 30 milliLiter(s) Oral daily PRN Constipation  oxyCODONE    IR 5 milliGRAM(s) Oral four times a day PRN Severe Pain (7 - 10)    Pertinent Labs:      CAPILLARY BLOOD GLUCOSE      POCT Blood Glucose.: 168 mg/dL (14 Dec 2020 11:50)  POCT Blood Glucose.: 251 mg/dL (14 Dec 2020 07:56)  POCT Blood Glucose.: 194 mg/dL (13 Dec 2020 21:40)  POCT Blood Glucose.: 87 mg/dL (13 Dec 2020 16:48)    Skin:     Estimated Needs:   [ ] no change since previous assessment  [ ] recalculated:     Previous Nutrition Diagnosis:   [ ] Inadequate Energy Intake [ ]Inadequate Oral Intake [ ] Excessive Energy Intake   [ ] Underweight [ ] Increased Nutrient Needs [ ] Overweight/Obesity   [ ] Altered GI Function [ ] Unintended Weight Loss [ ] Food & Nutrition Related Knowledge Deficit [ ] Malnutrition     Nutrition Diagnosis is [ ] ongoing  [ ] resolved [ ] not applicable     New Nutrition Diagnosis: [ ] not applicable       Interventions:   Recommend  [ ] Change Diet To:  [ ] Nutrition Supplement  [ ] Nutrition Support  [ ] Other:     Monitoring and Evaluation:   [ ] PO intake [ x ] Tolerance to diet prescription [ x ] weights [ x ] labs[ x ] follow up per protocol  [ ] other: Assessment/Follow up: Pt awake/alert, however forgetful at times during visit. Continues on consistent carbohydrate diet. Tolerating well however reports only fair appetite. Minimal po intake documented in EMR. 85% documented 12/13. Per lunch plate waste observation 12/14 ~50% consumed. No report N/V. Severe constipation. Pt states no BM > 1 week. Multiple stool softeners/laxatives rx. Encourage po fluids/dietary fiber. Fluctuating BS. Accuchecks(12/13)212, 161,87,194 (12/14)251,168. Endo following. Currently on 15units lantus qam, admelog 5units TID with moderate dose sliding scale covg. Pt declined DM nutrition therapy on second attempt. Declined 12/7.     Factors impacting intake: [ ] none [ ] nausea  [ ] vomiting [ ] diarrhea [x ] constipation  [ ]chewing problems [ ] swallowing issues  [x ] other: lack of appetite    Diet Presciption: Diet, Consistent Carbohydrate/No Snacks (12-06-20 @ 17:21)    Intake: Fair; 50-85%    Current Weight: 165.7lbs(12/7), Request current weight to assess.       Pertinent Medications: MEDICATIONS  (STANDING):  ciprofloxacin     Tablet 500 milliGRAM(s) Oral every 12 hours  dextrose 40% Gel 15 Gram(s) Oral once  dextrose 50% Injectable 25 Gram(s) IV Push once  dextrose 50% Injectable 12.5 Gram(s) IV Push once  dextrose 50% Injectable 25 Gram(s) IV Push once  furosemide    Tablet 20 milliGRAM(s) Oral daily  glucagon  Injectable 1 milliGRAM(s) IntraMuscular once  heparin   Injectable 5000 Unit(s) SubCutaneous every 8 hours  insulin glargine Injectable (LANTUS) 15 Unit(s) SubCutaneous every morning  insulin lispro (ADMELOG) corrective regimen sliding scale   SubCutaneous three times a day before meals  insulin lispro Injectable (ADMELOG) 5 Unit(s) SubCutaneous three times a day before meals  levothyroxine 112 MICROGram(s) Oral daily  lisinopril 40 milliGRAM(s) Oral daily  metoprolol succinate  milliGRAM(s) Oral daily  polyethylene glycol 3350 17 Gram(s) Oral two times a day  psyllium Powder 1 Packet(s) Oral every 24 hours  senna 2 Tablet(s) Oral at bedtime  simvastatin 5 milliGRAM(s) Oral at bedtime  tamsulosin 0.8 milliGRAM(s) Oral at bedtime  urea Oral Powder 15 Gram(s) Oral daily    MEDICATIONS  (PRN):  acetaminophen   Tablet .. 650 milliGRAM(s) Oral every 6 hours PRN Mild Pain (1 - 3)  bisacodyl 5 milliGRAM(s) Oral every 12 hours PRN Constipation  magnesium hydroxide Suspension 30 milliLiter(s) Oral daily PRN Constipation  oxyCODONE    IR 5 milliGRAM(s) Oral four times a day PRN Severe Pain (7 - 10)    Pertinent Labs:      CAPILLARY BLOOD GLUCOSE      POCT Blood Glucose.: 168 mg/dL (14 Dec 2020 11:50)  POCT Blood Glucose.: 251 mg/dL (14 Dec 2020 07:56)  POCT Blood Glucose.: 194 mg/dL (13 Dec 2020 21:40)  POCT Blood Glucose.: 87 mg/dL (13 Dec 2020 16:48)    Skin: intact. Michael 17.     Estimated Needs:   [x ] no change since previous assessment  [ ] recalculated:     Previous Nutrition Diagnosis: [x ]No active nutrition Dx    [ ] Inadequate Energy Intake [ ]Inadequate Oral Intake [ ] Excessive Energy Intake   [ ] Underweight [ ] Increased Nutrient Needs [ ] Overweight/Obesity   [ ] Altered GI Function [ ] Unintended Weight Loss [ ] Food & Nutrition Related Knowledge Deficit [ ] Malnutrition     Nutrition Diagnosis is [ ] ongoing  [ ] resolved [ ] not applicable     New Nutrition Diagnosis:   [x ] Altered nutrition related lab values (glucose, HgbA1c) related to endocrine dysfunction as evidenced by wide variations in BS, HgbA1c 7.8%, use of insulin.       Interventions:   Recommend  [ ] Change Diet To:  [x ] Nutrition Supplement: Glucerna 8oz po BID  [ ] Nutrition Support  [x ] Other: MVI with minerals daily.     Monitoring and Evaluation:   [ ] PO intake [ x ] Tolerance to diet prescription [ x ] weights [ x ] labs[ x ] follow up per protocol  [ ] other: Assessment/Follow up: Pt awake/alert, however forgetful at times during visit. Continues on consistent carbohydrate diet. Tolerating well however reports only fair appetite. Minimal po intake documented in EMR. 85% documented 12/13. Per lunch plate waste observation 12/14 ~50% consumed. No report N/V. Severe constipation. Pt states no BM > 1 week. Multiple stool softeners/laxatives rx. Encourage po fluids/dietary fiber. Fluctuating BS. Accuchecks(12/13)212, 161,87,194 (12/14)251,168. Endo following. Currently on 15units lantus qam, admelog 5units TID with moderate dose sliding scale covg. Pt declined DM nutrition therapy on second attempt. Declined 12/7.     Factors impacting intake: [ ] none [ ] nausea  [ ] vomiting [ ] diarrhea [x ] constipation  [ ]chewing problems [ ] swallowing issues  [x ] other: lack of appetite    Diet Presciption: Diet, Consistent Carbohydrate/No Snacks (12-06-20 @ 17:21)    Intake: Fair; 50-85%    Current Weight: 165.7lbs(12/7), Request current weight to assess.       Pertinent Medications: MEDICATIONS  (STANDING):  ciprofloxacin     Tablet 500 milliGRAM(s) Oral every 12 hours  dextrose 40% Gel 15 Gram(s) Oral once  dextrose 50% Injectable 25 Gram(s) IV Push once  dextrose 50% Injectable 12.5 Gram(s) IV Push once  dextrose 50% Injectable 25 Gram(s) IV Push once  furosemide    Tablet 20 milliGRAM(s) Oral daily  glucagon  Injectable 1 milliGRAM(s) IntraMuscular once  heparin   Injectable 5000 Unit(s) SubCutaneous every 8 hours  insulin glargine Injectable (LANTUS) 15 Unit(s) SubCutaneous every morning  insulin lispro (ADMELOG) corrective regimen sliding scale   SubCutaneous three times a day before meals  insulin lispro Injectable (ADMELOG) 5 Unit(s) SubCutaneous three times a day before meals  levothyroxine 112 MICROGram(s) Oral daily  lisinopril 40 milliGRAM(s) Oral daily  metoprolol succinate  milliGRAM(s) Oral daily  polyethylene glycol 3350 17 Gram(s) Oral two times a day  psyllium Powder 1 Packet(s) Oral every 24 hours  senna 2 Tablet(s) Oral at bedtime  simvastatin 5 milliGRAM(s) Oral at bedtime  tamsulosin 0.8 milliGRAM(s) Oral at bedtime  urea Oral Powder 15 Gram(s) Oral daily    MEDICATIONS  (PRN):  acetaminophen   Tablet .. 650 milliGRAM(s) Oral every 6 hours PRN Mild Pain (1 - 3)  bisacodyl 5 milliGRAM(s) Oral every 12 hours PRN Constipation  magnesium hydroxide Suspension 30 milliLiter(s) Oral daily PRN Constipation  oxyCODONE    IR 5 milliGRAM(s) Oral four times a day PRN Severe Pain (7 - 10)    Pertinent Labs:      CAPILLARY BLOOD GLUCOSE      POCT Blood Glucose.: 168 mg/dL (14 Dec 2020 11:50)  POCT Blood Glucose.: 251 mg/dL (14 Dec 2020 07:56)  POCT Blood Glucose.: 194 mg/dL (13 Dec 2020 21:40)  POCT Blood Glucose.: 87 mg/dL (13 Dec 2020 16:48)    Skin: intact. Michael 17.     Estimated Needs:   [x ] no change since previous assessment  [ ] recalculated:     Previous Nutrition Diagnosis: [x ]No active nutrition Dx    [ ] Inadequate Energy Intake [ ]Inadequate Oral Intake [ ] Excessive Energy Intake   [ ] Underweight [ ] Increased Nutrient Needs [ ] Overweight/Obesity   [ ] Altered GI Function [ ] Unintended Weight Loss [ ] Food & Nutrition Related Knowledge Deficit [ ] Malnutrition     Nutrition Diagnosis is [ ] ongoing  [ ] resolved [ ] not applicable     New Nutrition Diagnosis:   [x ] Altered nutrition related lab values (glucose, HgbA1c) related to endocrine dysfunction as evidenced by wide variations in BS, HgbA1c 7.8%, use of insulin.         Interventions:   Recommend  [ ] Change Diet To:  [x ] Nutrition Supplement: Glucerna 8oz po BID  [ ] Nutrition Support  [x ] Other: MVI with minerals daily. Encourage dietary fiber/adequate fluids for bowel regularity.      Monitoring and Evaluation:   [x ] PO intake [ x ] Tolerance to diet prescription [ x ] weights [ x ] labs[ x ] follow up per protocol  [ ] other:

## 2020-12-14 NOTE — PROGRESS NOTE ADULT - PROBLEM SELECTOR PROBLEM 3
Humerus fracture
Leukocytosis
No

## 2020-12-14 NOTE — PROGRESS NOTE ADULT - SUBJECTIVE AND OBJECTIVE BOX
Patient is a 85y old  Female who presents with a chief complaint of Pelvic and left humerus fracture (14 Dec 2020 11:57)      INTERVAL HPI/OVERNIGHT EVENTS: Patient seen and examined. NAD. No complaints.    Vital Signs Last 24 Hrs  T(C): 36.7 (14 Dec 2020 13:29), Max: 37 (13 Dec 2020 22:01)  T(F): 98 (14 Dec 2020 13:29), Max: 98.6 (13 Dec 2020 22:01)  HR: 91 (14 Dec 2020 13:29) (81 - 91)  BP: 107/68 (14 Dec 2020 13:29) (107/68 - 162/77)  BP(mean): --  RR: 16 (14 Dec 2020 13:29) (16 - 18)  SpO2: 96% (14 Dec 2020 13:29) (92% - 96%)                                8.5    12.62 )-----------( 367      ( 13 Dec 2020 10:23 )             26.9       CAPILLARY BLOOD GLUCOSE      POCT Blood Glucose.: 168 mg/dL (14 Dec 2020 11:50)  POCT Blood Glucose.: 251 mg/dL (14 Dec 2020 07:56)  POCT Blood Glucose.: 194 mg/dL (13 Dec 2020 21:40)  POCT Blood Glucose.: 87 mg/dL (13 Dec 2020 16:48)              acetaminophen   Tablet .. 650 milliGRAM(s) Oral every 6 hours PRN  bisacodyl 5 milliGRAM(s) Oral every 12 hours PRN  ciprofloxacin     Tablet 500 milliGRAM(s) Oral every 12 hours  dextrose 40% Gel 15 Gram(s) Oral once  dextrose 50% Injectable 25 Gram(s) IV Push once  dextrose 50% Injectable 12.5 Gram(s) IV Push once  dextrose 50% Injectable 25 Gram(s) IV Push once  furosemide    Tablet 20 milliGRAM(s) Oral daily  glucagon  Injectable 1 milliGRAM(s) IntraMuscular once  heparin   Injectable 5000 Unit(s) SubCutaneous every 8 hours  insulin glargine Injectable (LANTUS) 15 Unit(s) SubCutaneous every morning  insulin lispro (ADMELOG) corrective regimen sliding scale   SubCutaneous three times a day before meals  insulin lispro Injectable (ADMELOG) 5 Unit(s) SubCutaneous three times a day before meals  levothyroxine 112 MICROGram(s) Oral daily  lisinopril 40 milliGRAM(s) Oral daily  magnesium hydroxide Suspension 30 milliLiter(s) Oral daily PRN  metoprolol succinate  milliGRAM(s) Oral daily  oxyCODONE    IR 5 milliGRAM(s) Oral four times a day PRN  polyethylene glycol 3350 17 Gram(s) Oral two times a day  psyllium Powder 1 Packet(s) Oral every 24 hours  senna 2 Tablet(s) Oral at bedtime  simvastatin 5 milliGRAM(s) Oral at bedtime  tamsulosin 0.8 milliGRAM(s) Oral at bedtime  urea Oral Powder 15 Gram(s) Oral daily              REVIEW OF SYSTEMS:  CONSTITUTIONAL: No fever, no weight loss, or no fatigue  NECK: No pain, no stiffness  RESPIRATORY: No cough, no wheezing, no chills, no hemoptysis, No shortness of breath  CARDIOVASCULAR: No chest pain, no palpitations, no dizziness, no leg swelling  GASTROINTESTINAL: No abdominal pain. No nausea, no vomiting, no hematemesis; No diarrhea, no constipation. No melena, no hematochezia.  GENITOURINARY: No dysuria, no frequency, no hematuria, no incontinence  NEUROLOGICAL: No headaches, no loss of strength, no numbness, no tremors  SKIN: No itching, no burning  MUSCULOSKELETAL: No joint pain, no swelling; No muscle, no back, no extremity pain  PSYCHIATRIC: No depression, no mood swings,   HEME/LYMPH: No easy bruising, no bleeding gums  ALLERY AND IMMUNOLOGIC: No hives       Consultant(s) Notes Reviewed:  [X] YES  [ ] NO    PHYSICAL EXAM:  GENERAL: NAD  HEAD:  Atraumatic, Normocephalic  EYES: EOMI, PERRLA, conjunctiva and sclera clear  ENMT: No tonsillar erythema, exudates, or enlargement; Moist mucous membranes  NECK: Supple, No JVD  NERVOUS SYSTEM:  Awake & alert  CHEST/LUNG: Clear to auscultation bilaterally; No rales, rhonchi, wheezing,  HEART: Regular rate and rhythm  ABDOMEN: Soft, Nontender, Nondistended; Bowel sounds present  EXTREMITIES:  No clubbing, cyanosis, or edema  LYMPH: No lymphadenopathy noted  SKIN: No rashes      Advanced care planning discussed with patient/family [X] YES   [ ] NO    Advanced care planning discussed with patient/family. Patient's health status was discussed. All appropriate changes have been made regarding patient's end-of-life care. Advanced care planning forms reviewed/discussed/completed.  20 minutes spent.

## 2020-12-14 NOTE — PROGRESS NOTE ADULT - PROBLEM SELECTOR PLAN 4
Synthroid
Blood glucose management per Endocrinology and primary team

## 2020-12-28 PROCEDURE — 99285 EMERGENCY DEPT VISIT HI MDM: CPT | Mod: 25

## 2020-12-28 PROCEDURE — 86769 SARS-COV-2 COVID-19 ANTIBODY: CPT

## 2020-12-28 PROCEDURE — 85027 COMPLETE CBC AUTOMATED: CPT

## 2020-12-28 PROCEDURE — 84443 ASSAY THYROID STIM HORMONE: CPT

## 2020-12-28 PROCEDURE — 82565 ASSAY OF CREATININE: CPT

## 2020-12-28 PROCEDURE — 82247 BILIRUBIN TOTAL: CPT

## 2020-12-28 PROCEDURE — 73080 X-RAY EXAM OF ELBOW: CPT

## 2020-12-28 PROCEDURE — 97166 OT EVAL MOD COMPLEX 45 MIN: CPT

## 2020-12-28 PROCEDURE — 83036 HEMOGLOBIN GLYCOSYLATED A1C: CPT

## 2020-12-28 PROCEDURE — 87186 SC STD MICRODIL/AGAR DIL: CPT

## 2020-12-28 PROCEDURE — 96374 THER/PROPH/DIAG INJ IV PUSH: CPT

## 2020-12-28 PROCEDURE — 84295 ASSAY OF SERUM SODIUM: CPT

## 2020-12-28 PROCEDURE — 72125 CT NECK SPINE W/O DYE: CPT

## 2020-12-28 PROCEDURE — 82607 VITAMIN B-12: CPT

## 2020-12-28 PROCEDURE — 85610 PROTHROMBIN TIME: CPT

## 2020-12-28 PROCEDURE — 97162 PT EVAL MOD COMPLEX 30 MIN: CPT

## 2020-12-28 PROCEDURE — 86850 RBC ANTIBODY SCREEN: CPT

## 2020-12-28 PROCEDURE — 97110 THERAPEUTIC EXERCISES: CPT

## 2020-12-28 PROCEDURE — 97535 SELF CARE MNGMENT TRAINING: CPT

## 2020-12-28 PROCEDURE — 36415 COLL VENOUS BLD VENIPUNCTURE: CPT

## 2020-12-28 PROCEDURE — 96375 TX/PRO/DX INJ NEW DRUG ADDON: CPT

## 2020-12-28 PROCEDURE — U0003: CPT

## 2020-12-28 PROCEDURE — 97116 GAIT TRAINING THERAPY: CPT

## 2020-12-28 PROCEDURE — 73100 X-RAY EXAM OF WRIST: CPT

## 2020-12-28 PROCEDURE — 86901 BLOOD TYPING SEROLOGIC RH(D): CPT

## 2020-12-28 PROCEDURE — 81001 URINALYSIS AUTO W/SCOPE: CPT

## 2020-12-28 PROCEDURE — 73110 X-RAY EXAM OF WRIST: CPT

## 2020-12-28 PROCEDURE — 73020 X-RAY EXAM OF SHOULDER: CPT

## 2020-12-28 PROCEDURE — 72192 CT PELVIS W/O DYE: CPT

## 2020-12-28 PROCEDURE — 85025 COMPLETE CBC W/AUTO DIFF WBC: CPT

## 2020-12-28 PROCEDURE — 80053 COMPREHEN METABOLIC PANEL: CPT

## 2020-12-28 PROCEDURE — 76857 US EXAM PELVIC LIMITED: CPT

## 2020-12-28 PROCEDURE — 82746 ASSAY OF FOLIC ACID SERUM: CPT

## 2020-12-28 PROCEDURE — 84145 PROCALCITONIN (PCT): CPT

## 2020-12-28 PROCEDURE — 97530 THERAPEUTIC ACTIVITIES: CPT

## 2020-12-28 PROCEDURE — 73060 X-RAY EXAM OF HUMERUS: CPT

## 2020-12-28 PROCEDURE — 71045 X-RAY EXAM CHEST 1 VIEW: CPT

## 2020-12-28 PROCEDURE — 86900 BLOOD TYPING SEROLOGIC ABO: CPT

## 2020-12-28 PROCEDURE — 73090 X-RAY EXAM OF FOREARM: CPT

## 2020-12-28 PROCEDURE — 70450 CT HEAD/BRAIN W/O DYE: CPT

## 2020-12-28 PROCEDURE — 73030 X-RAY EXAM OF SHOULDER: CPT

## 2020-12-28 PROCEDURE — 82962 GLUCOSE BLOOD TEST: CPT

## 2020-12-28 PROCEDURE — 87086 URINE CULTURE/COLONY COUNT: CPT

## 2020-12-28 PROCEDURE — 93005 ELECTROCARDIOGRAM TRACING: CPT

## 2020-12-28 PROCEDURE — 85730 THROMBOPLASTIN TIME PARTIAL: CPT

## 2021-01-01 NOTE — ED ADULT TRIAGE NOTE - IDEAL BODY WEIGHT(KG)
[place] Discharge Instructions:    Cesar Schwab is a 1 day old  infant, delivered at Gestational Age: 39w6d.    discharged to home, accompanied by mom and dad.    If you have any questions about your baby, please call your baby's doctor    Do not give your baby any medications unless directed by your Pediatrician    Call the doctor if:  · Fever of 100 degrees F or above (37.8 Celsius)  · Infant feels cool or the temperature is below 97.7 F rectally (36.5 Celsius)  · Forceful vomiting (not spitting up)  · Several feedings when infant does not suck  · Watery, runny stools (with mucous, blood or foul odor)  · Infant injury (fall from bed or table, dropped or severely shaken, or any other injuries)  · Constant crying  · Has problems breathing  · Any unusual rash  · Yellow color of the skin or eyes  · Has less than 6 wet diapers in a 24 hour period after the fifth day of life  · No stool (bowel movement) for 48 hours  · Redness, drainage or foul odor from the umbilical cord and/or circumcision site        Special instructions: In case you need to call the doctor about any of the above:    · Take baby's temperature and write it down  · Know how much and how many feedings the baby has had that day  · Note amount, color and consistency of urine and stools  · Note any changes in the infants behavior such as being very sleepy, very fussy or less active      Date and time of Delivery: 2021  9:11 AM     Delivery Method: Vaginal, Spontaneous [250]          APGAR'S  One minute Five minutes   Skin color:       Heart rate:       Grimace:       Muscle tone:       Breathing:       Totals: 8  9      Feeding method: Natural Human Milk  Infant Blood Type: A Rh Positive  Bilirubin   Bilirubin, Total (mg/dL)   Date Value   2021      [place] Screen: Done  on 21  Immunizations:   Most Recent Immunizations   Administered Date(s) Administered   • Hep B, adolescent or pediatric 2021   Deferred Date(s)  Deferred   • Hepatitis B Immune Globulin 2021      [place] Hearing Test Machine: Auditory Brainstem Response ([place]go) (21)  [place] Hearing Test Results: Pass R, Pass L (21)  Birth Weight: 6 lb 7 oz (2920 g)    Discharge weight: Weight: 2820 g  Discharge Date: 2021    Help after you leave the hospital:    Ideas for help at home: friends, family members, neighbors, and members of your jesse community are all there to help you.    For breastfeeding support call:  [facility] at [phone].      Special Instructions:       Reviewed with parent by:   SHANE Leigh  2021  11:53 AM   64

## 2022-01-23 NOTE — ED ADULT NURSE NOTE - IN THE PAST 12 MONTHS HAVE YOU USED DRUGS OTHER THAN THOSE REQUIRED FOR MEDICAL REASON?
01/23/22 0744   Patient Assessment/Suction   Level of Consciousness (AVPU) responds to pain   Respiratory Effort Normal;Unlabored   Expansion/Accessory Muscles/Retractions expansion symmetric;no retractions;no use of accessory muscles   All Lung Fields Breath Sounds diminished   Rhythm/Pattern, Respiratory assisted mechanically   Cough Frequency with stimulation   Cough Type assisted   Suction Method oral;tracheal   $ Suction Charges Inline Suction Procedure Stat Charge   Secretions Amount scant   Secretions Color yellow;pale   Secretions Characteristics thick   PRE-TX-O2   O2 Device (Oxygen Therapy) ventilator   $ Is the patient on Low Flow Oxygen? Yes   Pulse Oximetry Type Continuous   $ Pulse Oximetry - Multiple Charge Pulse Oximetry - Multiple   Positioning HOB elevated 30 degrees   ETCO2   $ ETCO2 Usage Currently wearing   ETCO2 Device Type Bedside Monitor;Ventilator;Artificial Airway   Skin Integrity   $ Wound Care Tech Time 15 min   Area Observed Upper lip;Lower lip;Corner lip   Skin Appearance without discoloration  (wound noted on the left of bottom lip)        Airway - Non-Surgical 01/21/22 1150 Endotracheal Tube   Placement Date/Time: 01/21/22 1150   Method of Intubation: Glidescope  Inserted by: Anesthesia MD  Staff/Resident Name(s): Dr. Jaramillo  Airway Device: Endotracheal Tube  Airway Device Size: 7.5  Style: Cuffed  Cuff Inflated With: Air  Placement Verifi...   Secured at 24 cm   Measured At Lips   Secured Location Right  (moved from center)   Secured by Commercial tube shaffer   Bite Block right;secure and patent   Site Condition Dry   Status Secured;Intact   Site Assessment Midline   Cuff Pressure 29 cm H2O   General Safety Checklist   Safety Promotion/Fall Prevention side rails raised   Airway Safety   Ambu bag with the patient? Yes, Adult Ambu   Is mask with the patient? Yes, Adult Mask   Suction set is at the bedside? Yes   Respiratory Interventions   Airway/Ventilation Management airway  patency maintained;pulmonary hygiene promoted   Airway/Ventilation Support pulmonary hygiene promoted   Vent Select   Conventional Vent Y   $ Ventilator Subsequent 1   Charged w/in last 24h YES   Preset Conventional Ventilator Settings   Vent Type [id number]   Ventilation Type VC   Humidity HME   Patient Ventilator Parameters   Mean Airway Pressure 9.1 cmH20   Conventional Ventilator Alarms   Alarms On Y   Ve High Alarm 18 L/min   Ve Low Alarm 5 L/min   Vt High Alarm 15 mL   Vt Low Alarm 4 mL   Resp Rate High Alarm 35 br/min   Press High Alarm 35 cmH2O   Delay Alarm (Sec) 20 sec(s)   IHI Ventilator Associated Pneumonia Bundle (Required)   Head of Bed Elevated  HOB 30   Oral Care Lip moisturizer applied;Mouth swabbed;Mouth moisturizer;Mouth suctioned;with half strength hydrogen peroxide      No

## 2022-04-07 NOTE — ED ADULT NURSE NOTE - NIH STROKE SCALE: 8. SENSORY, QM

## 2022-12-29 NOTE — PHYSICAL THERAPY INITIAL EVALUATION ADULT - TRANSFER SKILLS, REHAB EVAL
independent

## 2023-01-21 NOTE — DISCHARGE NOTE NURSING/CASE MANAGEMENT/SOCIAL WORK - NSTOBACCONEVERSMOKERY/N_GEN_A
Looked through 2 weeks of past Right Fax E-mails and did not find the below mentioned orders that need to be signed. Called [facility] but no answer. LVM to let them know our [facility] does not give out wound care orders and so would not be the correct clinic to send wound care orders to for signing. Let them know they may want to try the cardiology [facility] clinic instead.  Wendy Amador RN    
[facility] Call Center    Phone Message    May a detailed message be left on voicemail: no     Reason for Call: Other: [facility] staff called wondering if fax was received for patients wound care and if it can be signed off and faxed back. Please call. Thank you    Action Taken: Message routed to:  Other: ID    Travel Screening: Not Applicable                                                                      
No

## 2023-11-23 NOTE — DISCHARGE NOTE NURSING/CASE MANAGEMENT/SOCIAL WORK - CAREGIVER NAME
Give medication as directed.  Drink plenty of water.  Follow up with your pediatrician in 3-4 days.  Return for worsening condition or emergency needs.    Marleny Fuller

## 2024-02-23 NOTE — PROVIDER CONTACT NOTE (CRITICAL VALUE NOTIFICATION) - ASSESSMENT
"Annual-menopause  Subjective   Arabella Jane is a 51 y.o. female who is here for a routine exam.     Complaints:   no  Periods:  Pain:    no    HRT: no  History of abnormal Pap smear: no  History of abnormal mammogram: no      OB History          2    Para   2    Term   2       0    AB   0    Living   2         SAB   0    IAB   0    Ectopic   0    Multiple   0    Live Births   2                  Review of Systems      Objective   /84   Ht 1.702 m (5' 7\")   Wt 93.9 kg (207 lb)   LMP  (LMP Unknown) Comment: iud  BMI 32.42 kg/m²        General:   Alert and oriented, in no acute distress   Neck: Supple. No visible thyromegaly.    Breast/Axilla: Normal to palpation bilaterally without masses, skin changes, or nipple discharge.    Abdomen: Soft, non-tender, without masses or organomegaly   Vulva: Normal architecture without erythema, masses, or lesions.    Vagina: Normal mucosa without lesions, masses.  Positive atrophy. No abnormal vaginal discharge.    Cervix: Normal without masses, lesions, or signs of cervicitis.    Uterus: Normal mobile, non-enlarged uterus    Adnexa: Normal without masses or lesions   Pelvic Floor No POP noted. No high tone pelvic floor    Psych  Rectal Normal affect. Normal mood.   Normal stool, no masses, guaiac negative     Assessment/Plan   Diagnoses and all orders for this visit:  Encounter for gynecological examination without abnormal finding  Screening mammogram for breast cancer  -     BI mammo bilateral screening tomosynthesis; Future    Routine annual    Krystyna Quigley MD    " Pt asymptomatic, vss, no s/s of distress

## 2025-07-01 NOTE — H&P ADULT - PROBLEM/PLAN-2
It is acceptable to have random fever. If the fever continues for a whole day seek medical advice.  For constipation continue on glycolax once a day and substitute milk for water if possible.     DISPLAY PLAN FREE TEXT
